# Patient Record
Sex: FEMALE | Race: WHITE | Employment: UNEMPLOYED | URBAN - METROPOLITAN AREA
[De-identification: names, ages, dates, MRNs, and addresses within clinical notes are randomized per-mention and may not be internally consistent; named-entity substitution may affect disease eponyms.]

---

## 2017-10-11 ENCOUNTER — CARE COORDINATION (OUTPATIENT)
Dept: CARE COORDINATION | Age: 47
End: 2017-10-11

## 2018-10-16 ENCOUNTER — NURSE ONLY (OUTPATIENT)
Dept: FAMILY MEDICINE CLINIC | Age: 48
End: 2018-10-16
Payer: MEDICARE

## 2018-10-16 ENCOUNTER — HOSPITAL ENCOUNTER (OUTPATIENT)
Age: 48
Discharge: HOME | End: 2018-10-18
Payer: MEDICARE

## 2018-10-16 DIAGNOSIS — E55.9 VITAMIN D DEFICIENCY: ICD-10-CM

## 2018-10-16 DIAGNOSIS — Z00.00 ROUTINE GENERAL MEDICAL EXAMINATION AT A HEALTH CARE FACILITY: Primary | ICD-10-CM

## 2018-10-16 PROCEDURE — 36415 COLL VENOUS BLD VENIPUNCTURE: CPT | Performed by: FAMILY MEDICINE

## 2018-10-16 PROCEDURE — 80053 COMPREHEN METABOLIC PANEL: CPT

## 2018-10-16 PROCEDURE — 82306 VITAMIN D 25 HYDROXY: CPT

## 2018-10-16 PROCEDURE — 80061 LIPID PANEL: CPT

## 2018-10-16 PROCEDURE — 85025 COMPLETE CBC W/AUTO DIFF WBC: CPT

## 2018-10-16 PROCEDURE — 84443 ASSAY THYROID STIM HORMONE: CPT

## 2018-10-17 DIAGNOSIS — Z00.00 ROUTINE GENERAL MEDICAL EXAMINATION AT A HEALTH CARE FACILITY: ICD-10-CM

## 2018-10-17 DIAGNOSIS — E55.9 VITAMIN D DEFICIENCY: ICD-10-CM

## 2018-10-17 LAB
ALBUMIN SERPL-MCNC: 4.3 G/DL (ref 3.5–5.2)
ALP BLD-CCNC: 50 U/L (ref 35–104)
ALT SERPL-CCNC: 20 U/L (ref 0–32)
ANION GAP SERPL CALCULATED.3IONS-SCNC: 12 MMOL/L (ref 7–16)
AST SERPL-CCNC: 24 U/L (ref 0–31)
BASOPHILS ABSOLUTE: 0.05 E9/L (ref 0–0.2)
BASOPHILS RELATIVE PERCENT: 0.9 % (ref 0–2)
BILIRUB SERPL-MCNC: 0.5 MG/DL (ref 0–1.2)
BUN BLDV-MCNC: 10 MG/DL (ref 6–20)
CALCIUM SERPL-MCNC: 9.6 MG/DL (ref 8.6–10.2)
CHLORIDE BLD-SCNC: 100 MMOL/L (ref 98–107)
CHOLESTEROL, TOTAL: 185 MG/DL (ref 0–199)
CO2: 25 MMOL/L (ref 22–29)
CREAT SERPL-MCNC: 0.8 MG/DL (ref 0.5–1)
EOSINOPHILS ABSOLUTE: 0.17 E9/L (ref 0.05–0.5)
EOSINOPHILS RELATIVE PERCENT: 3 % (ref 0–6)
GFR AFRICAN AMERICAN: >60
GFR NON-AFRICAN AMERICAN: >60 ML/MIN/1.73
GLUCOSE BLD-MCNC: 70 MG/DL (ref 74–109)
HCT VFR BLD CALC: 43.4 % (ref 34–48)
HDLC SERPL-MCNC: 57 MG/DL
HEMOGLOBIN: 13.8 G/DL (ref 11.5–15.5)
IMMATURE GRANULOCYTES #: 0.01 E9/L
IMMATURE GRANULOCYTES %: 0.2 % (ref 0–5)
LDL CHOLESTEROL CALCULATED: 109 MG/DL (ref 0–99)
LYMPHOCYTES ABSOLUTE: 1.28 E9/L (ref 1.5–4)
LYMPHOCYTES RELATIVE PERCENT: 22.2 % (ref 20–42)
MCH RBC QN AUTO: 30.2 PG (ref 26–35)
MCHC RBC AUTO-ENTMCNC: 31.8 % (ref 32–34.5)
MCV RBC AUTO: 95 FL (ref 80–99.9)
MONOCYTES ABSOLUTE: 0.39 E9/L (ref 0.1–0.95)
MONOCYTES RELATIVE PERCENT: 6.8 % (ref 2–12)
NEUTROPHILS ABSOLUTE: 3.86 E9/L (ref 1.8–7.3)
NEUTROPHILS RELATIVE PERCENT: 66.9 % (ref 43–80)
PDW BLD-RTO: 13.7 FL (ref 11.5–15)
PLATELET # BLD: 244 E9/L (ref 130–450)
PMV BLD AUTO: 10 FL (ref 7–12)
POTASSIUM SERPL-SCNC: 4.7 MMOL/L (ref 3.5–5)
RBC # BLD: 4.57 E12/L (ref 3.5–5.5)
SODIUM BLD-SCNC: 137 MMOL/L (ref 132–146)
TOTAL PROTEIN: 7.2 G/DL (ref 6.4–8.3)
TRIGL SERPL-MCNC: 93 MG/DL (ref 0–149)
TSH SERPL DL<=0.05 MIU/L-ACNC: 2.09 UIU/ML (ref 0.27–4.2)
VITAMIN D 25-HYDROXY: 24 NG/ML (ref 30–100)
VLDLC SERPL CALC-MCNC: 19 MG/DL
WBC # BLD: 5.8 E9/L (ref 4.5–11.5)

## 2018-10-22 ENCOUNTER — OFFICE VISIT (OUTPATIENT)
Dept: FAMILY MEDICINE CLINIC | Age: 48
End: 2018-10-22
Payer: MEDICARE

## 2018-10-22 VITALS
SYSTOLIC BLOOD PRESSURE: 120 MMHG | DIASTOLIC BLOOD PRESSURE: 74 MMHG | TEMPERATURE: 98.3 F | RESPIRATION RATE: 16 BRPM | HEART RATE: 63 BPM | BODY MASS INDEX: 24.64 KG/M2 | WEIGHT: 157 LBS | OXYGEN SATURATION: 100 % | HEIGHT: 67 IN

## 2018-10-22 DIAGNOSIS — Z12.31 ENCOUNTER FOR SCREENING MAMMOGRAM FOR MALIGNANT NEOPLASM OF BREAST: Primary | ICD-10-CM

## 2018-10-22 DIAGNOSIS — Z00.00 ANNUAL PHYSICAL EXAM: ICD-10-CM

## 2018-10-22 DIAGNOSIS — R63.4 RECENT WEIGHT LOSS: ICD-10-CM

## 2018-10-22 PROCEDURE — G8484 FLU IMMUNIZE NO ADMIN: HCPCS | Performed by: FAMILY MEDICINE

## 2018-10-22 PROCEDURE — 99396 PREV VISIT EST AGE 40-64: CPT | Performed by: FAMILY MEDICINE

## 2018-10-22 ASSESSMENT — PATIENT HEALTH QUESTIONNAIRE - PHQ9
SUM OF ALL RESPONSES TO PHQ9 QUESTIONS 1 & 2: 0
2. FEELING DOWN, DEPRESSED OR HOPELESS: 0
SUM OF ALL RESPONSES TO PHQ QUESTIONS 1-9: 0
SUM OF ALL RESPONSES TO PHQ QUESTIONS 1-9: 0
1. LITTLE INTEREST OR PLEASURE IN DOING THINGS: 0

## 2018-10-22 NOTE — PROGRESS NOTES
Annual exam:  Patient is here for routine yearly physical/preventative visit. Patient has no complaints or concerns today. Patient is  generally healthy. Chronic medical conditions are generally controlled. Most recent labs reviewed with patient and  are not remarkable. Health maintenance reviewed with patient and is  up to date. Overall doing well. She lost 45 lbs since July when she joined a fitness program with exercise and meal planning. She is very happy with it and feels great. Past Medical History:   Diagnosis Date    Hearing loss     congenital      No past surgical history on file. Family History   Problem Relation Age of Onset   [employer] Hearing Loss Mother         deaf    Hearing Loss Father         deaf    Heart Disease Father         pacemaker     Social History     Social History    Marital status:      Spouse name: N/A    Number of children: N/A    Years of education: N/A     Occupational History    Not on file.      Social History Main Topics    Smoking status: Former Smoker     Quit date: 1/10/2007    Smokeless tobacco: Never Used    Alcohol use Yes      Comment: 3 Seagrams per month    Drug use: Yes     Frequency: 1.0 time per week     Types: Marijuana      Comment: daily    Sexual activity: Yes     Partners: Male     Other Topics Concern    Not on file     Social History Narrative    No narrative on file      No Known Allergies     Review of Systems:  Constitutional:  No fever, no fatigue, no chills, no headaches, no weight change  Dermatology:  No rash, no mole, no dry or sensitive skin  ENT:  No cough, no sore throat, no sinus pain, no runny nose, no ear pain  Cardiology:  No chest pain, no palpitations, no leg edema, no shortness of breath, no PND  Endocrinology:  No polydipsia, no polyuria, no cold intolerance, no heat intolerance, no polyphagia, no hair changes  Gastroenterology:  No dysphagia, no abdominal pain, no nausea, no vomiting, no constipation, no diarrhea, no heartburn  Female Reproductive:  No hot flashes, no abnormal vaginal discharge, no pain with menstruation, no pelvic pain  Musculoskeletal:  No joint pain, no leg cramps, no back pain, no muscle aches  Respiratory:  No shortness of breath, no orthopnea, no wheezing, no CHOPRA, no hemoptysis  Urology:  No blood in the urine, no urinary frequency, no urinary incontinence, no urinary urgency, no nocturia, no dysuria  Neurology:  No numbness/tingling, no dizziness, no weakness  Psychology:  No depression, no sleep disturbances, no suicidal ideation, no anxiety    Physical Exam:  Vitals:    10/22/18 0824   BP: 120/74   Pulse: 63   Resp: 16   Temp: 98.3 °F (36.8 °C)   TempSrc: Oral   SpO2: 100%   Weight: 157 lb (71.2 kg)   Height: 5' 7\" (1.702 m)     General:  Patient alert and oriented x 3, NAD, pleasant  HEENT:  Atraumatic, normocephalic, PERRLA, EOMI, clear conjunctiva, TMs clear, nose-clear, throat - no erythema, tonsils- wnl  Neck:  Supple, no goiter, no carotid bruits, no lymphadenopathy  Lungs:  CTA B  Heart:  RRR, no murmurs, gallops or rubs  Abdomen:  Soft, NTND, + bowel sounds  Back: full ROM, no CVA tenderness  Extremities:  No clubbing, cyanosis or edema  Neuro:  CN II-XII grossly intact, 5/5 strength in bilateral upper and lower extremities, 2 + reflexes. Skin: unremarkable    Assessment/Plan:  Etta Fraga was seen today for annual exam and discuss labs. Diagnoses and all orders for this visit:    Encounter for screening mammogram for malignant neoplasm of breast  -     EVAN DIGITAL SCREEN W CAD BILATERAL; Future    Annual physical exam    Recent weight loss      As above. Call or go to ED immediately if symptoms worsen or persist.  No Follow-up on file. or sooner if necessary. Educational materials and/or home exercises printed for patient's review and were included in patient instructions on his/her After Visit Summary and given to patient at the end of visit.       Counseled regarding above diagnosis, including possible risks and complications,  especially if left uncontrolled. Counseled regarding the possible side effects, risks, benefits and alternatives to treatment; patient and/or guardian verbalizes understanding, agrees, feels comfortable with and wishes to proceed with above treatment plan. Advised patient to call with any new medication issues, and read all Rx info from pharmacy to assure aware of all possible risks and side effects of medication before taking. Reviewed age and gender appropriate health screening exams and vaccinations. Advised patient regarding importance of keeping up with recommended health maintenance and to schedule as soon as possible if overdue, as this is important in assessing for undiagnosed pathology, especially cancer, as well as protecting against potentially harmful/life threatening disease. Patient and/or guardian verbalizes understanding and agrees with above counseling, assessment and plan. All questions answered.

## 2018-12-05 DIAGNOSIS — Z12.31 ENCOUNTER FOR SCREENING MAMMOGRAM FOR MALIGNANT NEOPLASM OF BREAST: ICD-10-CM

## 2019-03-25 ENCOUNTER — OFFICE VISIT (OUTPATIENT)
Dept: FAMILY MEDICINE CLINIC | Age: 49
End: 2019-03-25
Payer: MEDICARE

## 2019-03-25 VITALS
TEMPERATURE: 98.4 F | DIASTOLIC BLOOD PRESSURE: 72 MMHG | HEART RATE: 78 BPM | WEIGHT: 157 LBS | OXYGEN SATURATION: 97 % | HEIGHT: 67 IN | SYSTOLIC BLOOD PRESSURE: 116 MMHG | RESPIRATION RATE: 16 BRPM | BODY MASS INDEX: 24.64 KG/M2

## 2019-03-25 DIAGNOSIS — N64.4 BREAST PAIN: Primary | ICD-10-CM

## 2019-03-25 PROCEDURE — G8420 CALC BMI NORM PARAMETERS: HCPCS | Performed by: FAMILY MEDICINE

## 2019-03-25 PROCEDURE — 99213 OFFICE O/P EST LOW 20 MIN: CPT | Performed by: FAMILY MEDICINE

## 2019-03-25 PROCEDURE — G8510 SCR DEP NEG, NO PLAN REQD: HCPCS | Performed by: FAMILY MEDICINE

## 2019-03-25 PROCEDURE — G8427 DOCREV CUR MEDS BY ELIG CLIN: HCPCS | Performed by: FAMILY MEDICINE

## 2019-03-25 PROCEDURE — G8484 FLU IMMUNIZE NO ADMIN: HCPCS | Performed by: FAMILY MEDICINE

## 2019-03-25 PROCEDURE — 1036F TOBACCO NON-USER: CPT | Performed by: FAMILY MEDICINE

## 2019-03-25 ASSESSMENT — PATIENT HEALTH QUESTIONNAIRE - PHQ9
1. LITTLE INTEREST OR PLEASURE IN DOING THINGS: 0
2. FEELING DOWN, DEPRESSED OR HOPELESS: 0
SUM OF ALL RESPONSES TO PHQ9 QUESTIONS 1 & 2: 0
SUM OF ALL RESPONSES TO PHQ QUESTIONS 1-9: 0
SUM OF ALL RESPONSES TO PHQ QUESTIONS 1-9: 0

## 2019-04-05 DIAGNOSIS — N64.4 BREAST PAIN: ICD-10-CM

## 2019-06-10 ENCOUNTER — APPOINTMENT (OUTPATIENT)
Dept: GENERAL RADIOLOGY | Age: 49
End: 2019-06-10
Payer: MEDICARE

## 2019-06-10 ENCOUNTER — HOSPITAL ENCOUNTER (EMERGENCY)
Age: 49
Discharge: HOME | End: 2019-06-10
Payer: MEDICARE

## 2019-06-10 VITALS
WEIGHT: 157 LBS | TEMPERATURE: 98.4 F | HEART RATE: 69 BPM | DIASTOLIC BLOOD PRESSURE: 86 MMHG | HEIGHT: 67 IN | SYSTOLIC BLOOD PRESSURE: 137 MMHG | RESPIRATION RATE: 16 BRPM | OXYGEN SATURATION: 96 % | BODY MASS INDEX: 24.64 KG/M2

## 2019-06-10 DIAGNOSIS — R07.89 CHEST WALL PAIN: ICD-10-CM

## 2019-06-10 DIAGNOSIS — R07.81 RIB PAIN ON LEFT SIDE: Primary | ICD-10-CM

## 2019-06-10 PROCEDURE — 99283 EMERGENCY DEPT VISIT LOW MDM: CPT

## 2019-06-10 PROCEDURE — 71101 X-RAY EXAM UNILAT RIBS/CHEST: CPT

## 2019-06-10 PROCEDURE — 6360000002 HC RX W HCPCS: Performed by: NURSE PRACTITIONER

## 2019-06-10 PROCEDURE — 96372 THER/PROPH/DIAG INJ SC/IM: CPT

## 2019-06-10 RX ORDER — MELOXICAM 7.5 MG/1
7.5 TABLET ORAL 2 TIMES DAILY
Qty: 30 TABLET | Refills: 0 | Status: SHIPPED | OUTPATIENT
Start: 2019-06-10 | End: 2019-07-03 | Stop reason: SDUPTHER

## 2019-06-10 RX ORDER — PREDNISONE 20 MG/1
TABLET ORAL
Qty: 18 TABLET | Refills: 0 | Status: SHIPPED | OUTPATIENT
Start: 2019-06-10 | End: 2019-06-20

## 2019-06-10 RX ORDER — DEXAMETHASONE SODIUM PHOSPHATE 10 MG/ML
10 INJECTION INTRAMUSCULAR; INTRAVENOUS ONCE
Status: COMPLETED | OUTPATIENT
Start: 2019-06-10 | End: 2019-06-10

## 2019-06-10 RX ORDER — CYCLOBENZAPRINE HCL 10 MG
10 TABLET ORAL 3 TIMES DAILY PRN
Qty: 30 TABLET | Refills: 0 | Status: SHIPPED | OUTPATIENT
Start: 2019-06-10 | End: 2019-06-20

## 2019-06-10 RX ORDER — KETOROLAC TROMETHAMINE 30 MG/ML
60 INJECTION, SOLUTION INTRAMUSCULAR; INTRAVENOUS ONCE
Status: COMPLETED | OUTPATIENT
Start: 2019-06-10 | End: 2019-06-10

## 2019-06-10 RX ADMIN — DEXAMETHASONE SODIUM PHOSPHATE 10 MG: 10 INJECTION INTRAMUSCULAR; INTRAVENOUS at 11:22

## 2019-06-10 RX ADMIN — KETOROLAC TROMETHAMINE 60 MG: 30 INJECTION, SOLUTION INTRAMUSCULAR at 11:22

## 2019-06-10 ASSESSMENT — PAIN DESCRIPTION - PAIN TYPE: TYPE: ACUTE PAIN

## 2019-06-10 ASSESSMENT — PAIN SCALES - WONG BAKER: WONGBAKER_NUMERICALRESPONSE: 2

## 2019-06-10 ASSESSMENT — PAIN DESCRIPTION - ONSET: ONSET: ON-GOING

## 2019-06-10 ASSESSMENT — PAIN DESCRIPTION - FREQUENCY: FREQUENCY: INTERMITTENT

## 2019-06-10 ASSESSMENT — PAIN DESCRIPTION - DESCRIPTORS: DESCRIPTORS: DISCOMFORT

## 2019-06-10 ASSESSMENT — PAIN DESCRIPTION - ORIENTATION: ORIENTATION: LEFT

## 2019-06-10 ASSESSMENT — PAIN DESCRIPTION - LOCATION: LOCATION: BREAST

## 2019-06-10 ASSESSMENT — PAIN DESCRIPTION - PROGRESSION: CLINICAL_PROGRESSION: GRADUALLY WORSENING

## 2019-06-10 ASSESSMENT — PAIN SCALES - GENERAL: PAINLEVEL_OUTOF10: 8

## 2019-06-10 NOTE — LETTER
[facility] Emergency Department  [address] [place] [id number]  Phone: [phone]               Holley 10, 2019    Patient: Saniya Oliver   YOB: 1970   Date of Visit: 6/10/2019       To Whom It May Concern:    Kwame Vega was seen and treated in our emergency department on 6/10/2019. She may return to work on 06/12/2019.       Sincerely,       Lindy Marin RN         Signature:__________________________________

## 2019-06-10 NOTE — ED PROVIDER NOTES
[facility]     Department of Emergency Medicine   ED  Provider Note  Admit Date/RoomTime: 6/10/2019  9:42 AM  ED Room: 32/32   Chief Complaint   Rib Pain (left side, had recent US done at [facility].  )    History of Present Illness   Source of history provided by:  patient. History/Exam Limitations: none. Hakan Erazo is a 52 y.o. old female with a past medical history of:   Past Medical History:   Diagnosis Date    Hearing loss     congenital    presents to the emergency department by private vehicle, for pressure-like left lateral chest pain which occured 4 day(s) prior to arrival.  The complaint occurred as a result of no trauma. Previous injury: no.  Since onset the symptoms have been moderate in degree. Her symptoms are associated with none. Her pain is aggraveated by position, breathing, touch and chest wall motion and relieved by nothing. She denies any head injury or loss of consciousness. Tetanus Status: up to date. ROS    Pertinent positives and negatives are stated within HPI, all other systems reviewed and are negative. Past Surgical History:  has no past surgical history on file. Social History:  reports that she quit smoking about 12 years ago. She has never used smokeless tobacco. She reports that she drinks alcohol. She reports that she has current or past drug history. Drug: Marijuana. Frequency: 1.00 time per week. Family History: family history includes Hearing Loss in her father and mother; Heart Disease in her father. Allergies: Patient has no known allergies. Physical Exam           ED Triage Vitals [06/10/19 0941]   BP Temp Temp Source Pulse Resp SpO2 Height Weight   137/86 98.4 °F (36.9 °C) Oral 69 16 96 % 5' 7\" (1.702 m) 157 lb (71.2 kg)      Oxygen Saturation Interpretation: Normal.    Constitutional:  Alert, development consistent with age. HEENT:  NC/NT. Airway patent. Neck:  Normal ROM. Supple.   Respiratory:  Clear to auscultation and breath sounds equal.  CV:  Regular rate and rhythm, normal heart sounds, without pathological murmurs, ectopy, gallops, or rubs. Chest:  left lateral chest tender to palpation, which does reproduce pain. Crepitance: No.          Skin:  Without swelling, erythema or lesions noted. GI:  Abdomen Soft, nontender, good bowel sounds. No firm or pulsatile mass. Integument:  Normal turgor. Warm, dry, without visible rash, unless noted elsewhere. Extremities: No tenderness or edema noted. Neurological:  Oriented. Motor functions intact. Lab / Imaging Results   (All laboratory and radiology results have been personally reviewed by myself)  Labs:  No results found for this visit on 06/10/19. Imaging: All Radiology results interpreted by Radiologist unless otherwise noted. XR RIBS LEFT INCLUDE CHEST (MIN 3 VIEWS)   Final Result   No significant abnormal findings. ED Course / Medical Decision Making     Medications   dexamethasone (DECADRON) injection 10 mg (10 mg Intramuscular Given 6/10/19 1122)   ketorolac (TORADOL) injection 60 mg (60 mg Intramuscular Given 6/10/19 1122)        Re-examination:  6/10/19       Time: 1000   Patients symptoms are improving. Consult(s):   None    Procedure(s):   none    MDM:   No history of trauma- patient said she has a sharp pain in the side of her chest- she had an US yesterday which was negative for anything acute and the CXR today was also negative. PAtient treated for the pain and discharged home    Counseling: The emergency provider has spoken with the patient and discussed todays results, in addition to providing specific details for the plan of care and counseling regarding the diagnosis and prognosis. Questions are answered at this time and they are agreeable with the plan. Assessment     1. Rib pain on left side    2.  Chest wall pain      Plan   Discharge to home  Patient condition is stable    New Medications     Discharge Medication List as of 6/10/2019 10:41 AM      START taking these medications    Details   cyclobenzaprine (FLEXERIL) 10 MG tablet Take 1 tablet by mouth 3 times daily as needed for Muscle spasms, Disp-30 tablet, R-0Print      predniSONE (DELTASONE) 20 MG tablet Sig.: Take 60mg  Po qd x 3 days, then 40mg po qd x3 days, then 20mg po qd x 3 days. QS x 9 days, Disp-18 tablet, R-0Print      meloxicam (MOBIC) 7.5 MG tablet Take 1 tablet by mouth 2 times daily, Disp-30 tablet, R-0Print           Electronically signed by Sharyle Cue, APRN - CNP   DD: 6/10/19  **This report was transcribed using voice recognition software. Every effort was made to ensure accuracy; however, inadvertent computerized transcription errors may be present.   END OF ED PROVIDER NOTE      Sharyle Cue, APRN - CNP  06/11/19 2021

## 2019-06-13 ENCOUNTER — TELEPHONE (OUTPATIENT)
Dept: FAMILY MEDICINE CLINIC | Age: 49
End: 2019-06-13

## 2019-06-13 NOTE — TELEPHONE ENCOUNTER
[facility] ([facility]) ED Follow up Call    Reason for ED visit:  Rib Pain    Unable to reach patient

## 2019-06-14 ENCOUNTER — TELEPHONE (OUTPATIENT)
Dept: FAMILY MEDICINE CLINIC | Age: 49
End: 2019-06-14

## 2019-06-14 NOTE — TELEPHONE ENCOUNTER
Based on Primary Care 7 Measures, patient is due for their Medicare Annual Wellness Visit. Letter sent to patient in regards to scheduling at their earliest convenience.

## 2019-07-03 ENCOUNTER — OFFICE VISIT (OUTPATIENT)
Dept: FAMILY MEDICINE CLINIC | Age: 49
End: 2019-07-03
Payer: MEDICARE

## 2019-07-03 VITALS
RESPIRATION RATE: 16 BRPM | OXYGEN SATURATION: 96 % | TEMPERATURE: 98.6 F | SYSTOLIC BLOOD PRESSURE: 122 MMHG | BODY MASS INDEX: 23.32 KG/M2 | HEIGHT: 67 IN | HEART RATE: 61 BPM | WEIGHT: 148.6 LBS | DIASTOLIC BLOOD PRESSURE: 79 MMHG

## 2019-07-03 DIAGNOSIS — S29.011A MUSCLE STRAIN OF CHEST WALL, INITIAL ENCOUNTER: Primary | ICD-10-CM

## 2019-07-03 PROCEDURE — 1036F TOBACCO NON-USER: CPT | Performed by: FAMILY MEDICINE

## 2019-07-03 PROCEDURE — G8427 DOCREV CUR MEDS BY ELIG CLIN: HCPCS | Performed by: FAMILY MEDICINE

## 2019-07-03 PROCEDURE — 99213 OFFICE O/P EST LOW 20 MIN: CPT | Performed by: FAMILY MEDICINE

## 2019-07-03 PROCEDURE — G8420 CALC BMI NORM PARAMETERS: HCPCS | Performed by: FAMILY MEDICINE

## 2019-07-03 RX ORDER — MELOXICAM 7.5 MG/1
7.5 TABLET ORAL 2 TIMES DAILY
Qty: 60 TABLET | Refills: 0 | Status: SHIPPED | OUTPATIENT
Start: 2019-07-03 | End: 2019-08-02 | Stop reason: SDUPTHER

## 2019-07-03 NOTE — PROGRESS NOTES
Chief Complaint   Patient presents with    Breast Pain    Follow-Up from Hospital       HPI:  Anitha Robin presents to the office for ER follow up. Patient was seen in the ER for left side breast pain on 6/10. Since being discharged from the ER Belén's  symptoms have been improving but ongoing. She rates pain 2/10. Any prescribed medications have been finished. Discharge instructions and any medication changes were again reviewed. Patient's past medical, surgical, social and/or family history reviewed, updated in chart, and are non-contributory (unless otherwise stated). Medications and allergies also reviewed and updated in chart.       Review of Systems:  Constitutional:  No fever, no fatigue, no chills, no headaches, no weight change  Dermatology:  No rash, no mole, no dry or sensitive skin  ENT:  No cough, no sore throat, no sinus pain, no runny nose, no ear pain  Cardiology:  No chest pain, no palpitations, no leg edema, no shortness of breath, no PND  Gastroenterology:  No dysphagia, no abdominal pain, no nausea, no vomiting, no constipation, no diarrhea, no heartburn  Musculoskeletal:  No joint pain, no leg cramps, no back pain, no muscle aches  Respiratory:  No shortness of breath, no orthopnea, no wheezing, no CHOPRA, no hemoptysis  Urology:  No blood in the urine, no urinary frequency, no urinary incontinence, no urinary urgency, no nocturia, no dysuria    Vitals:    07/03/19 1127   BP: 122/79   Pulse: 61   Resp: 16   Temp: 98.6 °F (37 °C)   TempSrc: Oral   SpO2: 96%   Weight: 148 lb 9.6 oz (67.4 kg)   Height: 5' 7\" (1.702 m)       General:  Patient alert and oriented x 3, NAD, pleasant  HEENT:  Atraumatic, normocephalic, PERRLA, EOMI, clear conjunctiva, TMs clear, nose-clear, throat - no erythema  Neck:  Supple, no goiter, no carotid bruits, no LAD  Lungs:  CTA B  Heart:  RRR, no murmurs, gallops or rubs  Abdomen:  Soft/nt/nd, + bowel sounds  Extremities:  No clubbing, cyanosis or edema  Skin:

## 2019-08-02 RX ORDER — MELOXICAM 7.5 MG/1
7.5 TABLET ORAL 2 TIMES DAILY
Qty: 60 TABLET | Refills: 0 | Status: SHIPPED | OUTPATIENT
Start: 2019-08-02 | End: 2019-09-10 | Stop reason: SDUPTHER

## 2019-09-10 RX ORDER — MELOXICAM 7.5 MG/1
7.5 TABLET ORAL 2 TIMES DAILY
Qty: 60 TABLET | Refills: 3 | Status: SHIPPED
Start: 2019-09-10 | End: 2020-04-03 | Stop reason: SDUPTHER

## 2019-09-19 ENCOUNTER — OFFICE VISIT (OUTPATIENT)
Dept: FAMILY MEDICINE CLINIC | Age: 49
End: 2019-09-19
Payer: MEDICARE

## 2019-09-19 VITALS
HEART RATE: 69 BPM | DIASTOLIC BLOOD PRESSURE: 71 MMHG | TEMPERATURE: 98.6 F | WEIGHT: 147.4 LBS | HEIGHT: 67 IN | OXYGEN SATURATION: 97 % | SYSTOLIC BLOOD PRESSURE: 116 MMHG | BODY MASS INDEX: 23.13 KG/M2 | RESPIRATION RATE: 16 BRPM

## 2019-09-19 DIAGNOSIS — R63.4 RECENT WEIGHT LOSS: ICD-10-CM

## 2019-09-19 DIAGNOSIS — Z12.31 ENCOUNTER FOR SCREENING MAMMOGRAM FOR MALIGNANT NEOPLASM OF BREAST: Primary | ICD-10-CM

## 2019-09-19 DIAGNOSIS — E78.5 HYPERLIPIDEMIA, UNSPECIFIED HYPERLIPIDEMIA TYPE: ICD-10-CM

## 2019-09-19 DIAGNOSIS — E55.9 VITAMIN D DEFICIENCY: ICD-10-CM

## 2019-09-19 DIAGNOSIS — Z00.00 ROUTINE GENERAL MEDICAL EXAMINATION AT A HEALTH CARE FACILITY: ICD-10-CM

## 2019-09-19 PROCEDURE — 90471 IMMUNIZATION ADMIN: CPT | Performed by: FAMILY MEDICINE

## 2019-09-19 PROCEDURE — 90715 TDAP VACCINE 7 YRS/> IM: CPT | Performed by: FAMILY MEDICINE

## 2019-09-19 PROCEDURE — G0438 PPPS, INITIAL VISIT: HCPCS | Performed by: FAMILY MEDICINE

## 2019-09-19 ASSESSMENT — PATIENT HEALTH QUESTIONNAIRE - PHQ9
SUM OF ALL RESPONSES TO PHQ QUESTIONS 1-9: 2
SUM OF ALL RESPONSES TO PHQ QUESTIONS 1-9: 2

## 2019-09-19 ASSESSMENT — LIFESTYLE VARIABLES
HOW OFTEN DO YOU HAVE SIX OR MORE DRINKS ON ONE OCCASION: 0
HOW OFTEN DO YOU HAVE A DRINK CONTAINING ALCOHOL: 1
HOW OFTEN DURING THE LAST YEAR HAVE YOU HAD A FEELING OF GUILT OR REMORSE AFTER DRINKING: 0
HOW OFTEN DURING THE LAST YEAR HAVE YOU BEEN UNABLE TO REMEMBER WHAT HAPPENED THE NIGHT BEFORE BECAUSE YOU HAD BEEN DRINKING: 0
HAVE YOU OR SOMEONE ELSE BEEN INJURED AS A RESULT OF YOUR DRINKING: 0
AUDIT-C TOTAL SCORE: 2
HAS A RELATIVE, FRIEND, DOCTOR, OR ANOTHER HEALTH PROFESSIONAL EXPRESSED CONCERN ABOUT YOUR DRINKING OR SUGGESTED YOU CUT DOWN: 0
HOW OFTEN DURING THE LAST YEAR HAVE YOU FAILED TO DO WHAT WAS NORMALLY EXPECTED FROM YOU BECAUSE OF DRINKING: 0
HOW OFTEN DURING THE LAST YEAR HAVE YOU FOUND THAT YOU WERE NOT ABLE TO STOP DRINKING ONCE YOU HAD STARTED: 0
HOW MANY STANDARD DRINKS CONTAINING ALCOHOL DO YOU HAVE ON A TYPICAL DAY: 1
HOW OFTEN DURING THE LAST YEAR HAVE YOU NEEDED AN ALCOHOLIC DRINK FIRST THING IN THE MORNING TO GET YOURSELF GOING AFTER A NIGHT OF HEAVY DRINKING: 0
AUDIT TOTAL SCORE: 2

## 2019-09-19 NOTE — PROGRESS NOTES
Questionnaire: Screen for Alcohol Misuse  How often do you have a drink containing alcohol?: Monthly or less  How many standard drinks containing alcohol do you have on a typical day when drinking?: Three or four  How often do you have six or more drinks on one occasion?: Never  Audit-C Score: 2  During the past year, how often have you found that you were not able to stop drinking once you had started?: Never  During the past year, how often have you failed to do what was normally expected of you because of drinking?: Never  During the past year, how often have you needed a drink in the morning to get yourself going after a heavy drinking session?: Never  During the past year, how often have you had a feeling of guilt or remorse after drinking?: Never  During the past year, have you been unable to remember what happened the night before because you had been drinking?: Never  Have you or someone else been injured as a result of your drinking?: No  Has a relative or friend, doctor or health worker been concerned about your drinking or suggested you cut down?: No  Total Score: 2  Substance Abuse Interventions:  · none    General Health:  General  In general, how would you say your health is?: Very Good  In the past 7 days, have you experienced any of the following? New or Increased Pain, New or Increased Fatigue, Loneliness, Social Isolation, Stress or Anger?: None of These  Do you get the social and emotional support that you need?: Yes  Do you have a Living Will?: (!) No  General Health Risk Interventions:  · No Living Will: provided the state-specific advance directive document to the patient    Health Habits/Nutrition:  Health Habits/Nutrition  Do you exercise for at least 20 minutes 2-3 times per week?: Yes  Have you lost any weight without trying in the past 3 months?: No  Do you eat fewer than 2 meals per day?: No  Have you seen a dentist within the past year?: (!) No  Body mass index is 23.09 kg/m².   Health Habits/Nutrition Interventions:  · Dental exam overdue:  patient encouraged to make appointment with his/her dentist    Personalized Preventive Plan   Current Health Maintenance Status    There is no immunization history on file for this patient. Health Maintenance   Topic Date Due    HIV screen  03/10/1985    DTaP/Tdap/Td vaccine (1 - Tdap) 03/10/1989    Annual Wellness Visit (AWV)  05/29/2019    Flu vaccine (1) 09/01/2019    Breast cancer screen  11/20/2019    Cervical cancer screen  06/22/2020    Lipid screen  10/16/2023    Pneumococcal 0-64 years Vaccine  Aged Out     Recommendations for [employer] Due: see orders and patient instructions/AVS.  . Recommended screening schedule for the next 5-10 years is provided to the patient in written form: see Patient Instructions/AVS.    Assunta Spurling was seen today for medicare awv.     Diagnoses and all orders for this visit:    Encounter for screening mammogram for malignant neoplasm of breast

## 2019-09-19 NOTE — PATIENT INSTRUCTIONS
Personalized Preventive Plan for Victor Hugo Cunningham - 9/19/2019  Medicare offers a range of preventive health benefits. Some of the tests and screenings are paid in full while other may be subject to a deductible, co-insurance, and/or copay. Some of these benefits include a comprehensive review of your medical history including lifestyle, illnesses that may run in your family, and various assessments and screenings as appropriate. After reviewing your medical record and screening and assessments performed today your provider may have ordered immunizations, labs, imaging, and/or referrals for you. A list of these orders (if applicable) as well as your Preventive Care list are included within your After Visit Summary for your review. Other Preventive Recommendations:    · A preventive eye exam performed by an eye specialist is recommended every 1-2 years to screen for glaucoma; cataracts, macular degeneration, and other eye disorders. · A preventive dental visit is recommended every 6 months. · Try to get at least 150 minutes of exercise per week or 10,000 steps per day on a pedometer . · Order or download the FREE \"Exercise & Physical Activity: Your Everyday Guide\" from [employer]. Call 8-198.967.5859 or search [employer] online. · You need 5875-7424 mg of calcium and 5644-6796 IU of vitamin D per day. It is possible to meet your calcium requirement with diet alone, but a vitamin D supplement is usually necessary to meet this goal.  · When exposed to the sun, use a sunscreen that protects against both UVA and UVB radiation with an SPF of 30 or greater. Reapply every 2 to 3 hours or after sweating, drying off with a towel, or swimming. · Always wear a seat belt when traveling in a car. Always wear a helmet when riding a bicycle or motorcycle.

## 2020-04-03 RX ORDER — MELOXICAM 7.5 MG/1
7.5 TABLET ORAL 2 TIMES DAILY
Qty: 60 TABLET | Refills: 3 | Status: SHIPPED
Start: 2020-04-03 | End: 2021-01-21

## 2020-08-11 ENCOUNTER — OFFICE VISIT (OUTPATIENT)
Dept: FAMILY MEDICINE CLINIC | Age: 50
End: 2020-08-11
Payer: MEDICARE

## 2020-08-11 VITALS
TEMPERATURE: 97 F | WEIGHT: 165 LBS | DIASTOLIC BLOOD PRESSURE: 83 MMHG | OXYGEN SATURATION: 97 % | BODY MASS INDEX: 25.84 KG/M2 | HEART RATE: 79 BPM | SYSTOLIC BLOOD PRESSURE: 132 MMHG

## 2020-08-11 PROCEDURE — G8419 CALC BMI OUT NRM PARAM NOF/U: HCPCS | Performed by: FAMILY MEDICINE

## 2020-08-11 PROCEDURE — 3017F COLORECTAL CA SCREEN DOC REV: CPT | Performed by: FAMILY MEDICINE

## 2020-08-11 PROCEDURE — G8510 SCR DEP NEG, NO PLAN REQD: HCPCS | Performed by: FAMILY MEDICINE

## 2020-08-11 PROCEDURE — 1036F TOBACCO NON-USER: CPT | Performed by: FAMILY MEDICINE

## 2020-08-11 PROCEDURE — G8427 DOCREV CUR MEDS BY ELIG CLIN: HCPCS | Performed by: FAMILY MEDICINE

## 2020-08-11 PROCEDURE — 99213 OFFICE O/P EST LOW 20 MIN: CPT | Performed by: FAMILY MEDICINE

## 2020-08-11 RX ORDER — CEPHALEXIN 500 MG/1
500 CAPSULE ORAL 2 TIMES DAILY
Qty: 14 CAPSULE | Refills: 0 | Status: SHIPPED
Start: 2020-08-11 | End: 2020-10-20

## 2020-08-11 SDOH — ECONOMIC STABILITY: FOOD INSECURITY: WITHIN THE PAST 12 MONTHS, THE FOOD YOU BOUGHT JUST DIDN'T LAST AND YOU DIDN'T HAVE MONEY TO GET MORE.: SOMETIMES TRUE

## 2020-08-11 SDOH — ECONOMIC STABILITY: INCOME INSECURITY: HOW HARD IS IT FOR YOU TO PAY FOR THE VERY BASICS LIKE FOOD, HOUSING, MEDICAL CARE, AND HEATING?: SOMEWHAT HARD

## 2020-08-11 SDOH — ECONOMIC STABILITY: FOOD INSECURITY: WITHIN THE PAST 12 MONTHS, YOU WORRIED THAT YOUR FOOD WOULD RUN OUT BEFORE YOU GOT MONEY TO BUY MORE.: SOMETIMES TRUE

## 2020-08-11 ASSESSMENT — PATIENT HEALTH QUESTIONNAIRE - PHQ9
2. FEELING DOWN, DEPRESSED OR HOPELESS: 0
SUM OF ALL RESPONSES TO PHQ QUESTIONS 1-9: 0
SUM OF ALL RESPONSES TO PHQ QUESTIONS 1-9: 0
1. LITTLE INTEREST OR PLEASURE IN DOING THINGS: 0
SUM OF ALL RESPONSES TO PHQ9 QUESTIONS 1 & 2: 0

## 2020-08-11 NOTE — PROGRESS NOTES
Chief Complaint   Patient presents with    Other     ?vaginal infection        HPI:  Patient is here for follow-up of lump. Patient complains of pain. Pt here today for eval small lump on her groin area. No drainage or seeping. Pt noticed it about 1 week ago d/t being painful. Patient's past medical, surgical, social and/or family history reviewed, updated in chart, and are non-contributory (unless otherwise stated). Medications and allergies also reviewed and updated in chart. Review of Systems:  Constitutional:  No fever, no fatigue, no chills, no headaches, no weight change  Dermatology:  No rash, no mole, no dry or sensitive skin  ENT:  No cough, no sore throat, no sinus pain, no runny nose, no ear pain  Cardiology:  No chest pain, no palpitations, no leg edema, no shortness of breath, no PND  Gastroenterology:  No dysphagia, no abdominal pain, no nausea, no vomiting, no constipation, no diarrhea, no heartburn  Musculoskeletal:  No joint pain, no leg cramps, no back pain, no muscle aches  Respiratory:  No shortness of breath, no orthopnea, no wheezing, no CHOPRA, no hemoptysis  Urology:  No blood in the urine, no urinary frequency, no urinary incontinence, no urinary urgency, no nocturia, no dysuria  Vitals:    08/11/20 1205   BP: 132/83   Pulse: 79   Temp: 97 °F (36.1 °C)   SpO2: 97%   Weight: 165 lb (74.8 kg)       General:  Patient alert and oriented x 3, NAD, pleasant  HEENT:  Atraumatic, normocephalic, PERRLA, EOMI, clear conjunctiva, TMs clear, nose-clear, throat - no erythema  Neck:  Supple, no goiter, no carotid bruits, no lymphadenopathy  Lungs:  CTA B  Heart:  RRR, no murmurs, gallops or rubs  Abdomen:  Soft/nt/nd, + bowel sounds  Extremities:  No clubbing, cyanosis or edema  Skin: unremarkable    Assessment/Plan:      Alejandra Jon was seen today for other. Diagnoses and all orders for this visit:    Folliculitis    Other orders  -     cephALEXin (KEFLEX) 500 MG capsule;  Take 1 capsule by mouth 2 times daily      As above. Call or go to ED immediately if symptoms worsen or persist.  No follow-ups on file. , or sooner if necessary. Educational materials and/or home exercises printed for patient's review and were included in patient instructions on his/her After Visit Summary and given to patient at the end of visit. Counseled regarding above diagnosis, including possible risks and complications,  especially if left uncontrolled. Counseled regarding the possible side effects, risks, benefits and alternatives to treatment; patient and/or guardian verbalizes understanding, agrees, feels comfortable with and wishes to proceed with above treatment plan. Advised patient to call with any new medication issues, and read all Rx info from pharmacy to assure aware of all possible risks and side effects of medication before taking. Reviewed age and gender appropriate health screening exams and vaccinations. Advised patient regarding importance of keeping up with recommended health maintenance and to schedule as soon as possible if overdue, as this is important in assessing for undiagnosed pathology, especially cancer, as well as protecting against potentially harmful/life threatening disease. Patient and/or guardian verbalizes understanding and agrees with above counseling, assessment and plan. All questions answered. Gabriel Deleon MD  8/11/2020    I have personally reviewed and updated the chief complaint, HPI, Past Medical, Family and Social History, as well as the above Review of Systems.

## 2020-10-13 ENCOUNTER — NURSE ONLY (OUTPATIENT)
Dept: FAMILY MEDICINE CLINIC | Age: 50
End: 2020-10-13
Payer: MEDICARE

## 2020-10-13 ENCOUNTER — HOSPITAL ENCOUNTER (OUTPATIENT)
Age: 50
Discharge: HOME | End: 2020-10-15
Payer: MEDICARE

## 2020-10-13 LAB
ALBUMIN SERPL-MCNC: 4.4 G/DL (ref 3.5–5.2)
ALP BLD-CCNC: 56 U/L (ref 35–104)
ALT SERPL-CCNC: 13 U/L (ref 0–32)
ANION GAP SERPL CALCULATED.3IONS-SCNC: 12 MMOL/L (ref 7–16)
AST SERPL-CCNC: 22 U/L (ref 0–31)
BASOPHILS ABSOLUTE: 0.07 E9/L (ref 0–0.2)
BASOPHILS RELATIVE PERCENT: 1 % (ref 0–2)
BILIRUB SERPL-MCNC: 0.7 MG/DL (ref 0–1.2)
BUN BLDV-MCNC: 12 MG/DL (ref 6–20)
CALCIUM SERPL-MCNC: 9.6 MG/DL (ref 8.6–10.2)
CHLORIDE BLD-SCNC: 102 MMOL/L (ref 98–107)
CHOLESTEROL, TOTAL: 175 MG/DL (ref 0–199)
CO2: 25 MMOL/L (ref 22–29)
CREAT SERPL-MCNC: 0.9 MG/DL (ref 0.5–1)
EOSINOPHILS ABSOLUTE: 0.14 E9/L (ref 0.05–0.5)
EOSINOPHILS RELATIVE PERCENT: 2 % (ref 0–6)
GFR AFRICAN AMERICAN: >60
GFR NON-AFRICAN AMERICAN: >60 ML/MIN/1.73
GLUCOSE BLD-MCNC: 69 MG/DL (ref 74–99)
HCT VFR BLD CALC: 43.4 % (ref 34–48)
HDLC SERPL-MCNC: 55 MG/DL
HEMOGLOBIN: 14.3 G/DL (ref 11.5–15.5)
IMMATURE GRANULOCYTES #: 0.02 E9/L
IMMATURE GRANULOCYTES %: 0.3 % (ref 0–5)
LDL CHOLESTEROL CALCULATED: 106 MG/DL (ref 0–99)
LYMPHOCYTES ABSOLUTE: 1.51 E9/L (ref 1.5–4)
LYMPHOCYTES RELATIVE PERCENT: 21.8 % (ref 20–42)
MCH RBC QN AUTO: 30.7 PG (ref 26–35)
MCHC RBC AUTO-ENTMCNC: 32.9 % (ref 32–34.5)
MCV RBC AUTO: 93.1 FL (ref 80–99.9)
MONOCYTES ABSOLUTE: 0.43 E9/L (ref 0.1–0.95)
MONOCYTES RELATIVE PERCENT: 6.2 % (ref 2–12)
NEUTROPHILS ABSOLUTE: 4.76 E9/L (ref 1.8–7.3)
NEUTROPHILS RELATIVE PERCENT: 68.7 % (ref 43–80)
PDW BLD-RTO: 13.2 FL (ref 11.5–15)
PLATELET # BLD: 277 E9/L (ref 130–450)
PMV BLD AUTO: 10 FL (ref 7–12)
POTASSIUM SERPL-SCNC: 4.3 MMOL/L (ref 3.5–5)
RBC # BLD: 4.66 E12/L (ref 3.5–5.5)
SODIUM BLD-SCNC: 139 MMOL/L (ref 132–146)
TOTAL PROTEIN: 7.5 G/DL (ref 6.4–8.3)
TRIGL SERPL-MCNC: 68 MG/DL (ref 0–149)
TSH SERPL DL<=0.05 MIU/L-ACNC: 1.97 UIU/ML (ref 0.27–4.2)
VLDLC SERPL CALC-MCNC: 14 MG/DL
WBC # BLD: 6.9 E9/L (ref 4.5–11.5)

## 2020-10-13 PROCEDURE — 36415 COLL VENOUS BLD VENIPUNCTURE: CPT | Performed by: FAMILY MEDICINE

## 2020-10-13 PROCEDURE — 84443 ASSAY THYROID STIM HORMONE: CPT

## 2020-10-13 PROCEDURE — 80053 COMPREHEN METABOLIC PANEL: CPT

## 2020-10-13 PROCEDURE — 85025 COMPLETE CBC W/AUTO DIFF WBC: CPT

## 2020-10-13 PROCEDURE — 80061 LIPID PANEL: CPT

## 2020-10-20 ENCOUNTER — OFFICE VISIT (OUTPATIENT)
Dept: FAMILY MEDICINE CLINIC | Age: 50
End: 2020-10-20
Payer: MEDICARE

## 2020-10-20 VITALS
SYSTOLIC BLOOD PRESSURE: 127 MMHG | HEART RATE: 66 BPM | BODY MASS INDEX: 26.53 KG/M2 | RESPIRATION RATE: 16 BRPM | TEMPERATURE: 97.9 F | HEIGHT: 67 IN | DIASTOLIC BLOOD PRESSURE: 77 MMHG | WEIGHT: 169 LBS | OXYGEN SATURATION: 98 %

## 2020-10-20 PROCEDURE — G8484 FLU IMMUNIZE NO ADMIN: HCPCS | Performed by: FAMILY MEDICINE

## 2020-10-20 PROCEDURE — G0439 PPPS, SUBSEQ VISIT: HCPCS | Performed by: FAMILY MEDICINE

## 2020-10-20 PROCEDURE — 3017F COLORECTAL CA SCREEN DOC REV: CPT | Performed by: FAMILY MEDICINE

## 2020-10-20 RX ORDER — M-VIT,TX,IRON,MINS/CALC/FOLIC 27MG-0.4MG
1 TABLET ORAL DAILY
COMMUNITY

## 2020-10-20 RX ORDER — HYDROCODONE BITARTRATE AND ACETAMINOPHEN 5; 325 MG/1; MG/1
TABLET ORAL
COMMUNITY
Start: 2020-10-07 | End: 2021-01-21

## 2020-10-20 SDOH — ECONOMIC STABILITY: TRANSPORTATION INSECURITY
IN THE PAST 12 MONTHS, HAS LACK OF TRANSPORTATION KEPT YOU FROM MEETINGS, WORK, OR FROM GETTING THINGS NEEDED FOR DAILY LIVING?: NO

## 2020-10-20 SDOH — ECONOMIC STABILITY: TRANSPORTATION INSECURITY
IN THE PAST 12 MONTHS, HAS THE LACK OF TRANSPORTATION KEPT YOU FROM MEDICAL APPOINTMENTS OR FROM GETTING MEDICATIONS?: NO

## 2020-10-20 ASSESSMENT — PATIENT HEALTH QUESTIONNAIRE - PHQ9
1. LITTLE INTEREST OR PLEASURE IN DOING THINGS: 0
SUM OF ALL RESPONSES TO PHQ QUESTIONS 1-9: 0
2. FEELING DOWN, DEPRESSED OR HOPELESS: 0
SUM OF ALL RESPONSES TO PHQ QUESTIONS 1-9: 0
SUM OF ALL RESPONSES TO PHQ9 QUESTIONS 1 & 2: 0
SUM OF ALL RESPONSES TO PHQ QUESTIONS 1-9: 0

## 2020-10-20 ASSESSMENT — LIFESTYLE VARIABLES: HOW OFTEN DO YOU HAVE A DRINK CONTAINING ALCOHOL: 1

## 2020-10-20 NOTE — PROGRESS NOTES
Medicare Annual Wellness Visit  Name: Kishan Romo Date: 10/20/2020   MRN: [id number] Sex: Female   Age: 48 y.o. Ethnicity: Non-/Non    : 1970 Race: Noel Urrutia is here for Medicare AWV    Screenings for behavioral, psychosocial and functional/safety risks, and cognitive dysfunction are all negative except as indicated below. These results, as well as other patient data from the [address] form, are documented in Flowsheets linked to this Encounter. No Known Allergies    Prior to Visit Medications    Medication Sig Taking? Authorizing Provider   Calcium Carb-Cholecalciferol (CALCIUM-VITAMIN D) 500-200 MG-UNIT per tablet Take 1 tablet by mouth 2 times daily (with meals) Yes Historical Provider, MD   BIOTIN PO Take by mouth Yes Historical Provider, MD   Multiple Vitamins-Minerals (THERAPEUTIC MULTIVITAMIN-MINERALS) tablet Take 1 tablet by mouth daily Yes Historical Provider, MD   HYDROcodone-acetaminophen (Cherylene Loveless) 5-325 MG per tablet  Yes Historical Provider, MD   meloxicam (MOBIC) 7.5 MG tablet Take 1 tablet by mouth 2 times daily Yes Jody Pedro MD   COLLAGEN PO Take 2,000 mg by mouth Yes Historical Provider, MD       Past Medical History:   Diagnosis Date    Hearing loss     congenital       No past surgical history on file.     Family History   Problem Relation Age of Onset    Hearing Loss Mother         deaf    Hearing Loss Father         deaf    Heart Disease Father         pacemaker       CareTeam (Including outside providers/suppliers regularly involved in providing care):   Patient Care Team:  Jody Pedro MD as PCP - General (Family Medicine)  Jody Pedro MD as PCP - REHABILITATION [facility] Empaneled Provider    Wt Readings from Last 3 Encounters:   10/20/20 169 lb (76.7 kg)   20 165 lb (74.8 kg)   19 147 lb 6.4 oz (66.9 kg)     Vitals:    10/20/20 1121 10/20/20 1141   BP: (!) 145/86 127/77   Pulse: 66    Resp: 16    Temp: 97.9 °F (36.6 °C)    TempSrc: Temporal    SpO2: 98%    Weight: 169 lb (76.7 kg)    Height: 5' 7\" (1.702 m)      Body mass index is 26.47 kg/m². Based upon direct observation of the patient, evaluation of cognition reveals recent and remote memory intact. General Appearance: alert and oriented to person, place and time, well developed and well- nourished, in no acute distress  Skin: warm and dry, no rash or erythema  Head: normocephalic and atraumatic  Eyes: pupils equal, round, and reactive to light, extraocular eye movements intact, conjunctivae normal  ENT: tympanic membrane, external ear and ear canal normal bilaterally, nose without deformity, nasal mucosa and turbinates normal without polyps  Neck: supple and non-tender without mass, no thyromegaly or thyroid nodules, no cervical lymphadenopathy  Pulmonary/Chest: clear to auscultation bilaterally- no wheezes, rales or rhonchi, normal air movement, no respiratory distress  Cardiovascular: normal rate, regular rhythm, normal S1 and S2, no murmurs, rubs, clicks, or gallops, distal pulses intact, no carotid bruits  Abdomen: soft, non-tender, non-distended, normal bowel sounds, no masses or organomegaly  Extremities: no cyanosis, clubbing or edema  Musculoskeletal: normal range of motion, no joint swelling, deformity or tenderness  Neurologic: reflexes normal and symmetric, no cranial nerve deficit, gait, coordination and speech normal    Patient's complete Health Risk Assessment and screening values have been reviewed and are found in Flowsheets. The following problems were reviewed today and where indicated follow up appointments were made and/or referrals ordered.     Positive Risk Factor Screenings with Interventions:     Substance History:  Social History     Tobacco History     Smoking Status  Former Smoker Quit date  1/10/2007    Smokeless Tobacco Use  Never Used          Alcohol History     Alcohol Use Status  Yes Comment  3 Seagrams per month          Drug Use Drug Use Status  Yes Types  Marijuana Frequency   1 time/week Comment  daily          Sexual Activity     Sexually Active  Yes Partners  Male               Alcohol Screening:       A score of 8 or more is associated with harmful or hazardous drinking. A score of 13 or more in women, and 15 or more in men, is likely to indicate alcohol dependence. Substance Abuse Interventions:  · Alcohol misuse/dependence:  patient is not ready to change his/her alcohol consumption behavior at this time    General Health and ACP:  General  In general, how would you say your health is?: Very Good  In the past 7 days, have you experienced any of the following?  New or Increased Pain, New or Increased Fatigue, Loneliness, Social Isolation, Stress or Anger?: (!) Stress  Do you get the social and emotional support that you need?: (!) No  Do you have a Living Will?: (!) No  Advance Directives     Power of  Living Will ACP-Advance Directive ACP-Power of     Not on File Not on File [address] [facility] Risk Interventions:  · No Living Will: Advance Care Planning addressed with patient today    Health Habits/Nutrition:  Health Habits/Nutrition  Do you exercise for at least 20 minutes 2-3 times per week?: Yes  Have you lost any weight without trying in the past 3 months?: No  Do you eat fewer than 2 meals per day?: No  Have you seen a dentist within the past year?: (!) No  Body mass index: (!) 26.47  Health Habits/Nutrition Interventions:  · Dental exam overdue:  patient encouraged to make appointment with his/her dentist    Hearing/Vision:  No exam data present  Hearing/Vision  Do you or your family notice any trouble with your hearing?: (!) Yes  Do you have difficulty driving, watching TV, or doing any of your daily activities because of your eyesight?: No  Have you had an eye exam within the past year?: Yes  Hearing/Vision Interventions:  · Hearing concerns:  patient declines any further evaluation/treatment

## 2020-10-20 NOTE — PATIENT INSTRUCTIONS
Personalized Preventive Plan for Jonna Diehl - 10/20/2020  Medicare offers a range of preventive health benefits. Some of the tests and screenings are paid in full while other may be subject to a deductible, co-insurance, and/or copay. Some of these benefits include a comprehensive review of your medical history including lifestyle, illnesses that may run in your family, and various assessments and screenings as appropriate. After reviewing your medical record and screening and assessments performed today your provider may have ordered immunizations, labs, imaging, and/or referrals for you. A list of these orders (if applicable) as well as your Preventive Care list are included within your After Visit Summary for your review. Other Preventive Recommendations:    · A preventive eye exam performed by an eye specialist is recommended every 1-2 years to screen for glaucoma; cataracts, macular degeneration, and other eye disorders. · A preventive dental visit is recommended every 6 months. · Try to get at least 150 minutes of exercise per week or 10,000 steps per day on a pedometer . · Order or download the FREE \"Exercise & Physical Activity: Your Everyday Guide\" from [employer]. Call 5-641.578.8035 or search [employer] online. · You need 4653-9092 mg of calcium and 2142-4260 IU of vitamin D per day. It is possible to meet your calcium requirement with diet alone, but a vitamin D supplement is usually necessary to meet this goal.  · When exposed to the sun, use a sunscreen that protects against both UVA and UVB radiation with an SPF of 30 or greater. Reapply every 2 to 3 hours or after sweating, drying off with a towel, or swimming. · Always wear a seat belt when traveling in a car. Always wear a helmet when riding a bicycle or motorcycle.

## 2021-01-25 ENCOUNTER — NURSE TRIAGE (OUTPATIENT)
Dept: OTHER | Facility: CLINIC | Age: 51
End: 2021-01-25

## 2021-01-25 ENCOUNTER — OFFICE VISIT (OUTPATIENT)
Dept: FAMILY MEDICINE CLINIC | Age: 51
End: 2021-01-25
Payer: MEDICARE

## 2021-01-25 ENCOUNTER — TELEPHONE (OUTPATIENT)
Dept: FAMILY MEDICINE CLINIC | Age: 51
End: 2021-01-25

## 2021-01-25 VITALS
BODY MASS INDEX: 27 KG/M2 | DIASTOLIC BLOOD PRESSURE: 60 MMHG | WEIGHT: 172 LBS | HEART RATE: 87 BPM | RESPIRATION RATE: 16 BRPM | OXYGEN SATURATION: 98 % | TEMPERATURE: 97.2 F | HEIGHT: 67 IN | SYSTOLIC BLOOD PRESSURE: 100 MMHG

## 2021-01-25 DIAGNOSIS — B02.9 HERPES ZOSTER WITHOUT COMPLICATION: Primary | ICD-10-CM

## 2021-01-25 PROCEDURE — G8427 DOCREV CUR MEDS BY ELIG CLIN: HCPCS | Performed by: FAMILY MEDICINE

## 2021-01-25 PROCEDURE — 99214 OFFICE O/P EST MOD 30 MIN: CPT | Performed by: FAMILY MEDICINE

## 2021-01-25 PROCEDURE — G8419 CALC BMI OUT NRM PARAM NOF/U: HCPCS | Performed by: FAMILY MEDICINE

## 2021-01-25 PROCEDURE — 3017F COLORECTAL CA SCREEN DOC REV: CPT | Performed by: FAMILY MEDICINE

## 2021-01-25 PROCEDURE — G8484 FLU IMMUNIZE NO ADMIN: HCPCS | Performed by: FAMILY MEDICINE

## 2021-01-25 PROCEDURE — 1036F TOBACCO NON-USER: CPT | Performed by: FAMILY MEDICINE

## 2021-01-25 RX ORDER — HYDROCODONE BITARTRATE AND ACETAMINOPHEN 5; 325 MG/1; MG/1
1 TABLET ORAL EVERY 4 HOURS PRN
Qty: 18 TABLET | Refills: 0 | Status: SHIPPED | OUTPATIENT
Start: 2021-01-25 | End: 2021-01-28

## 2021-01-25 RX ORDER — VALACYCLOVIR HYDROCHLORIDE 1 G/1
1000 TABLET, FILM COATED ORAL 3 TIMES DAILY
Qty: 20 TABLET | Refills: 0 | Status: SHIPPED | OUTPATIENT
Start: 2021-01-25 | End: 2021-02-01

## 2021-01-25 ASSESSMENT — PATIENT HEALTH QUESTIONNAIRE - PHQ9: SUM OF ALL RESPONSES TO PHQ QUESTIONS 1-9: 0

## 2021-01-25 NOTE — PROGRESS NOTES
2021    Chief Complaint   Patient presents with    Herpes Zoster     started last night very sore on back        HPI    Dora Spurger is a 48 y.o. patient that presents today for:    Herpes Zoster: Lesions on left back. Onset of symptoms was 1 day ago, unchanged since that time. Symptoms include irritability. Does trauma tend to precipitate a new lesion? No. What is the natural history of the lesions? Do they come and go No.? Come and persist? Yes. Any dysphagia, hoarseness, stridor, ocular irritation, dysuria, dyspareunia, hematuria)? No.    Patient's past medical, surgical, social and/or family history reviewed, updated in chart, and are non-contributory (unless otherwise stated). Medications and allergies also reviewed and updated in chart. ROS negative unless otherwise specified    Physical Exam  Temp Readings from Last 3 Encounters:   21 97.2 °F (36.2 °C)   21 97.3 °F (36.3 °C) (Temporal)   10/20/20 97.9 °F (36.6 °C) (Temporal)     Wt Readings from Last 3 Encounters:   21 172 lb (78 kg)   21 171 lb 9.6 oz (77.8 kg)   10/20/20 169 lb (76.7 kg)     BP Readings from Last 3 Encounters:   21 100/60   21 110/74   10/20/20 127/77     Pulse Readings from Last 3 Encounters:   21 87   10/20/20 66   20 79       General appearance: alert, well appearing, and in no distress, oriented to person, place, and time and normal appearing weight. CVS exam: normal rate, regular rhythm, normal S1, S2, no murmurs, rubs, clicks or gallops. Radial pulses 2+ bilateral.  PT/DP pulse 2+ bilat. No C/C/E    Chest: clear to auscultation, no wheezes, rales or rhonchi, symmetric air entry.      Abdomen: Soft, non-tender, non-distended, positive BS in all 4 quadrants    Extremities:Dorsalis pedis pulses palpated bilaterally, no clubbing, cyanosis, edema or erythema,     SKIN: no lesions, jaundice, petechiae, pallor, cyanosis, ecchymosis    NEURO: gross motor exam normal by observation, gait normal    Mental status - alert, oriented to person, place, and time, normal mood, behavior, speech, dress, motor activity, and thought processes      Assessment/Plan  Liudmila Ramos was seen today for herpes zoster. Diagnoses and all orders for this visit:    Herpes zoster without complication  -     valACYclovir (VALTREX) 1 g tablet; Take 1 tablet by mouth 3 times daily for 7 days  -     HYDROcodone-acetaminophen (NORCO) 5-325 MG per tablet; Take 1 tablet by mouth every 4 hours as needed for Pain (4-6 hours prn) for up to 3 days. Intended supply: 3 days. Take lowest dose possible to manage pain          Return in about 1 week (around 2/1/2021), or if symptoms worsen or fail to improve. Tiffany Unger, DO    Call or go to ED immediately if symptoms worsen or persist.    Educational materials and/or home exercises printed for patient's review and were included in patient instructions on his/her After Visit Summary and given to patient at the end of visit. Counseled regarding above diagnosis, including possible risks and complications,  especially if left uncontrolled. Counseled regarding the possible side effects, risks, benefits and alternatives to treatment; patient and/or guardian verbalizes understanding, agrees, feels comfortable with and wishes to proceed with above treatment plan. Advised patient to call with any new medication issues, and read all Rx info from pharmacy to assure aware of all possible risks and side effects of medication before taking. Reviewed age and gender appropriate health screening exams and vaccinations. Advised patient regarding importance of keeping up with recommended health maintenance and to schedule as soon as possible if overdue, as this is important in assessing for undiagnosed pathology, especially cancer, as well as protecting against potentially harmful/life threatening disease.       Patient and/or guardian verbalizes understanding and agrees with above counseling, assessment and plan. All questions answered.         HE AND YOUR MOTHER IN

## 2021-01-25 NOTE — TELEPHONE ENCOUNTER
Patient called pre-service center [facility] [place] with red flag complaint. Brief description of triage: rash to left back below shoulder blade that started last night, painful with touch and movement, denies itching. Patient concerned she has shingles, denies blistering at this time    Triage indicates for patient to be seen in office today    Care advice provided, patient verbalizes understanding; denies any other questions or concerns; instructed to call back for any new or worsening symptoms. Information placed in chat for [place]     Attention Provider: Thank you for allowing me to participate in the care of your patient. The patient was connected to triage in response to information provided to the [facility]. Please do not respond through this encounter as the response is not directed to a shared pool. Reason for Disposition   Painful rash with multiple small blisters grouped together (i.e., dermatomal distribution or 'band' or 'stripe')    Answer Assessment - Initial Assessment Questions  1. APPEARANCE of RASH: \"Describe the rash. \"       Started with pain to left back below shoulder blade    2. LOCATION: \"Where is the rash located? \"       Left back below shoulder seun    3. NUMBER: \"How many spots are there? \"   unsure    4. SIZE: \"How big are the spots? \" (Inches, centimeters or compare to size of a coin)       Approximately 4 inches     5. ONSET: \"When did the rash start? \"       Yesterday    6. ITCHING: \"Does the rash itch? \" If so, ask: \"How bad is the itch? \"  (Scale 1-10; or mild, moderate, severe)      No    7. PAIN: \"Does the rash hurt? \" If so, ask: \"How bad is the pain? \"  (Scale 1-10; or mild, moderate, severe)      Yes, unable to move arm due to pain    8. OTHER SYMPTOMS: \"Do you have any other symptoms? \" (e.g., fever)      no    9. PREGNANCY: \"Is there any chance you are pregnant? \" \"When was your last menstrual period? \"      No, LMP jan 17    Protocols used: RASH OR REDNESS - LOCALIZED-ADULT-[place]

## 2021-01-25 NOTE — TELEPHONE ENCOUNTER
Pt called through  line. She states last night she had pain upper left of back; today there is a rash in same spot and very painful. Call was transferred to triage nurse.

## 2021-02-02 ENCOUNTER — OFFICE VISIT (OUTPATIENT)
Dept: FAMILY MEDICINE CLINIC | Age: 51
End: 2021-02-02
Payer: MEDICARE

## 2021-02-02 VITALS
SYSTOLIC BLOOD PRESSURE: 127 MMHG | HEART RATE: 76 BPM | TEMPERATURE: 97.1 F | DIASTOLIC BLOOD PRESSURE: 79 MMHG | OXYGEN SATURATION: 99 % | RESPIRATION RATE: 16 BRPM

## 2021-02-02 DIAGNOSIS — B02.9 HERPES ZOSTER WITHOUT COMPLICATION: Primary | ICD-10-CM

## 2021-02-02 PROCEDURE — 1036F TOBACCO NON-USER: CPT | Performed by: FAMILY MEDICINE

## 2021-02-02 PROCEDURE — G8419 CALC BMI OUT NRM PARAM NOF/U: HCPCS | Performed by: FAMILY MEDICINE

## 2021-02-02 PROCEDURE — 3017F COLORECTAL CA SCREEN DOC REV: CPT | Performed by: FAMILY MEDICINE

## 2021-02-02 PROCEDURE — 99213 OFFICE O/P EST LOW 20 MIN: CPT | Performed by: FAMILY MEDICINE

## 2021-02-02 PROCEDURE — G8484 FLU IMMUNIZE NO ADMIN: HCPCS | Performed by: FAMILY MEDICINE

## 2021-02-02 PROCEDURE — G8427 DOCREV CUR MEDS BY ELIG CLIN: HCPCS | Performed by: FAMILY MEDICINE

## 2021-02-02 NOTE — PROGRESS NOTES
Chief Complaint   Patient presents with    Follow-up     shingles       HPI:  Patient is here for follow-up of shingles. Patient complains of continued soreness on back and difficulty laying on back. It is improving. It was 8/10 and now 4/10. She is done with Valtrex. Patient's past medical, surgical, social and/or family history reviewed, updated in chart, and are non-contributory (unless otherwise stated). Medications and allergies also reviewed and updated in chart. Review of Systems:  Constitutional:  No fever, no fatigue, no chills, no headaches, no weight change  Dermatology:  No rash, no mole, no dry or sensitive skin  ENT:  No cough, no sore throat, no sinus pain, no runny nose, no ear pain  Cardiology:  No chest pain, no palpitations, no leg edema, no shortness of breath, no PND  Gastroenterology:  No dysphagia, no abdominal pain, no nausea, no vomiting, no constipation, no diarrhea, no heartburn  Musculoskeletal:  No joint pain, no leg cramps, no back pain, no muscle aches  Respiratory:  No shortness of breath, no orthopnea, no wheezing, no CHOPRA, no hemoptysis  Urology:  No blood in the urine, no urinary frequency, no urinary incontinence, no urinary urgency, no nocturia, no dysuria  Vitals:    02/02/21 0955   BP: 127/79   Pulse: 76   Resp: 16   Temp: 97.1 °F (36.2 °C)   TempSrc: Temporal   SpO2: 99%       General:  Patient alert and oriented x 3, NAD, pleasant  HEENT:  Atraumatic, normocephalic, PERRLA, EOMI, clear conjunctiva, TMs clear, nose-clear, throat - no erythema  Neck:  Supple, no goiter, no carotid bruits, no lymphadenopathy  Lungs:  CTA B  Heart:  RRR, no murmurs, gallops or rubs  Abdomen:  Soft/nt/nd, + bowel sounds  Extremities:  No clubbing, cyanosis or edema  Skin: unremarkable    Assessment/Plan:      Stevie Bernheim was seen today for follow-up. Diagnoses and all orders for this visit:    Herpes zoster without complication    discussed need for Shingrix in the future.  Pt is agreeable. As above. Call or go to ED immediately if symptoms worsen or persist.  Return in about 6 months (around 8/2/2021). , or sooner if necessary. Educational materials and/or home exercises printed for patient's review and were included in patient instructions on his/her After Visit Summary and given to patient at the end of visit. Counseled regarding above diagnosis, including possible risks and complications,  especially if left uncontrolled. Counseled regarding the possible side effects, risks, benefits and alternatives to treatment; patient and/or guardian verbalizes understanding, agrees, feels comfortable with and wishes to proceed with above treatment plan. Advised patient to call with any new medication issues, and read all Rx info from pharmacy to assure aware of all possible risks and side effects of medication before taking. Reviewed age and gender appropriate health screening exams and vaccinations. Advised patient regarding importance of keeping up with recommended health maintenance and to schedule as soon as possible if overdue, as this is important in assessing for undiagnosed pathology, especially cancer, as well as protecting against potentially harmful/life threatening disease. Patient and/or guardian verbalizes understanding and agrees with above counseling, assessment and plan. All questions answered. Aj Jimenez MD  2/2/2021    I have personally reviewed and updated the chief complaint, HPI, Past Medical, Family and Social History, as well as the above Review of Systems.

## 2021-07-26 DIAGNOSIS — E78.5 HYPERLIPIDEMIA, UNSPECIFIED HYPERLIPIDEMIA TYPE: ICD-10-CM

## 2021-07-26 DIAGNOSIS — E03.9 HYPOTHYROIDISM, UNSPECIFIED TYPE: Primary | ICD-10-CM

## 2021-07-27 ENCOUNTER — NURSE ONLY (OUTPATIENT)
Dept: FAMILY MEDICINE CLINIC | Age: 51
End: 2021-07-27
Payer: MEDICARE

## 2021-07-27 DIAGNOSIS — E03.9 HYPOTHYROIDISM, UNSPECIFIED TYPE: ICD-10-CM

## 2021-07-27 DIAGNOSIS — E78.5 HYPERLIPIDEMIA, UNSPECIFIED HYPERLIPIDEMIA TYPE: ICD-10-CM

## 2021-07-27 PROCEDURE — 36415 COLL VENOUS BLD VENIPUNCTURE: CPT | Performed by: FAMILY MEDICINE

## 2021-07-28 LAB
ALBUMIN SERPL-MCNC: 4.5 G/DL (ref 3.5–5.2)
ALP BLD-CCNC: 50 U/L (ref 35–104)
ALT SERPL-CCNC: 19 U/L (ref 0–32)
ANION GAP SERPL CALCULATED.3IONS-SCNC: 4 MMOL/L (ref 7–16)
AST SERPL-CCNC: 26 U/L (ref 0–31)
BASOPHILS ABSOLUTE: 0.03 E9/L (ref 0–0.2)
BASOPHILS RELATIVE PERCENT: 0.4 % (ref 0–2)
BILIRUB SERPL-MCNC: 0.5 MG/DL (ref 0–1.2)
BUN BLDV-MCNC: 12 MG/DL (ref 6–20)
CALCIUM SERPL-MCNC: 9.8 MG/DL (ref 8.6–10.2)
CHLORIDE BLD-SCNC: 103 MMOL/L (ref 98–107)
CHOLESTEROL, TOTAL: 169 MG/DL (ref 0–199)
CO2: 32 MMOL/L (ref 22–29)
CREAT SERPL-MCNC: 0.8 MG/DL (ref 0.5–1)
EOSINOPHILS ABSOLUTE: 0.16 E9/L (ref 0.05–0.5)
EOSINOPHILS RELATIVE PERCENT: 1.9 % (ref 0–6)
GFR AFRICAN AMERICAN: >60
GFR NON-AFRICAN AMERICAN: >60 ML/MIN/1.73
GLUCOSE BLD-MCNC: 83 MG/DL (ref 74–99)
HCT VFR BLD CALC: 46.4 % (ref 34–48)
HDLC SERPL-MCNC: 57 MG/DL
HEMOGLOBIN: 14.8 G/DL (ref 11.5–15.5)
IMMATURE GRANULOCYTES #: 0.02 E9/L
IMMATURE GRANULOCYTES %: 0.2 % (ref 0–5)
LDL CHOLESTEROL CALCULATED: 99 MG/DL (ref 0–99)
LYMPHOCYTES ABSOLUTE: 1.72 E9/L (ref 1.5–4)
LYMPHOCYTES RELATIVE PERCENT: 20.4 % (ref 20–42)
MCH RBC QN AUTO: 30.6 PG (ref 26–35)
MCHC RBC AUTO-ENTMCNC: 31.9 % (ref 32–34.5)
MCV RBC AUTO: 96.1 FL (ref 80–99.9)
MONOCYTES ABSOLUTE: 0.5 E9/L (ref 0.1–0.95)
MONOCYTES RELATIVE PERCENT: 5.9 % (ref 2–12)
NEUTROPHILS ABSOLUTE: 6 E9/L (ref 1.8–7.3)
NEUTROPHILS RELATIVE PERCENT: 71.2 % (ref 43–80)
PDW BLD-RTO: 13.9 FL (ref 11.5–15)
PLATELET # BLD: 286 E9/L (ref 130–450)
PMV BLD AUTO: 10.1 FL (ref 7–12)
POTASSIUM SERPL-SCNC: 5.3 MMOL/L (ref 3.5–5)
RBC # BLD: 4.83 E12/L (ref 3.5–5.5)
SODIUM BLD-SCNC: 139 MMOL/L (ref 132–146)
TOTAL PROTEIN: 7.6 G/DL (ref 6.4–8.3)
TRIGL SERPL-MCNC: 67 MG/DL (ref 0–149)
TSH SERPL DL<=0.05 MIU/L-ACNC: 2.14 UIU/ML (ref 0.27–4.2)
VLDLC SERPL CALC-MCNC: 13 MG/DL
WBC # BLD: 8.4 E9/L (ref 4.5–11.5)

## 2021-08-16 ENCOUNTER — TELEPHONE (OUTPATIENT)
Dept: FAMILY MEDICINE CLINIC | Age: 51
End: 2021-08-16

## 2021-08-16 NOTE — TELEPHONE ENCOUNTER
----- Message from Kalen Bagley sent at 8/16/2021  1:22 PM EDT -----  Subject: Refill Request    QUESTIONS  Name of Medication? Other - oxycodone  Patient-reported dosage and instructions? 5mg  How many days do you have left? 0  Preferred Pharmacy? [address] phone number (if available)? [phone]  ---------------------------------------------------------------------------  --------------  CALL BACK INFO  What is the best way for the office to contact you? OK to leave message on   voicemail  Preferred Call Back Phone Number?  [phone]

## 2021-08-17 ENCOUNTER — HOSPITAL ENCOUNTER (EMERGENCY)
Age: 51
Discharge: HOME | End: 2021-08-17
Attending: EMERGENCY MEDICINE
Payer: MEDICARE

## 2021-08-17 ENCOUNTER — APPOINTMENT (OUTPATIENT)
Dept: GENERAL RADIOLOGY | Age: 51
End: 2021-08-17
Payer: MEDICARE

## 2021-08-17 VITALS
HEART RATE: 104 BPM | TEMPERATURE: 97.3 F | HEIGHT: 67 IN | DIASTOLIC BLOOD PRESSURE: 83 MMHG | WEIGHT: 150 LBS | SYSTOLIC BLOOD PRESSURE: 136 MMHG | BODY MASS INDEX: 23.54 KG/M2 | RESPIRATION RATE: 14 BRPM

## 2021-08-17 DIAGNOSIS — Z48.89 ENCOUNTER FOR POSTOPERATIVE WOUND CARE: Primary | ICD-10-CM

## 2021-08-17 DIAGNOSIS — T81.49XA WOUND INFECTION AFTER SURGERY: ICD-10-CM

## 2021-08-17 LAB
ALBUMIN SERPL-MCNC: 4.1 G/DL (ref 3.5–5.2)
ALP BLD-CCNC: 148 U/L (ref 35–104)
ALT SERPL-CCNC: 27 U/L (ref 0–32)
ANION GAP SERPL CALCULATED.3IONS-SCNC: 11 MMOL/L (ref 7–16)
AST SERPL-CCNC: 31 U/L (ref 0–31)
BASOPHILS ABSOLUTE: 0.05 E9/L (ref 0–0.2)
BASOPHILS RELATIVE PERCENT: 0.6 % (ref 0–2)
BILIRUB SERPL-MCNC: 0.4 MG/DL (ref 0–1.2)
BUN BLDV-MCNC: 14 MG/DL (ref 6–20)
C-REACTIVE PROTEIN: 0.6 MG/DL (ref 0–0.4)
CALCIUM SERPL-MCNC: 9.9 MG/DL (ref 8.6–10.2)
CHLORIDE BLD-SCNC: 98 MMOL/L (ref 98–107)
CO2: 27 MMOL/L (ref 22–29)
CREAT SERPL-MCNC: 0.6 MG/DL (ref 0.5–1)
EOSINOPHILS ABSOLUTE: 0.19 E9/L (ref 0.05–0.5)
EOSINOPHILS RELATIVE PERCENT: 2.4 % (ref 0–6)
GFR AFRICAN AMERICAN: >60
GFR NON-AFRICAN AMERICAN: >60 ML/MIN/1.73
GLUCOSE BLD-MCNC: 90 MG/DL (ref 74–99)
HCT VFR BLD CALC: 36 % (ref 34–48)
HEMOGLOBIN: 11.7 G/DL (ref 11.5–15.5)
IMMATURE GRANULOCYTES #: 0.07 E9/L
IMMATURE GRANULOCYTES %: 0.9 % (ref 0–5)
LACTIC ACID: 0.9 MMOL/L (ref 0.5–2.2)
LYMPHOCYTES ABSOLUTE: 1.42 E9/L (ref 1.5–4)
LYMPHOCYTES RELATIVE PERCENT: 18.1 % (ref 20–42)
MCH RBC QN AUTO: 31.8 PG (ref 26–35)
MCHC RBC AUTO-ENTMCNC: 32.5 % (ref 32–34.5)
MCV RBC AUTO: 97.8 FL (ref 80–99.9)
MONOCYTES ABSOLUTE: 0.51 E9/L (ref 0.1–0.95)
MONOCYTES RELATIVE PERCENT: 6.5 % (ref 2–12)
NEUTROPHILS ABSOLUTE: 5.62 E9/L (ref 1.8–7.3)
NEUTROPHILS RELATIVE PERCENT: 71.5 % (ref 43–80)
PDW BLD-RTO: 17 FL (ref 11.5–15)
PLATELET # BLD: 669 E9/L (ref 130–450)
PMV BLD AUTO: 8.4 FL (ref 7–12)
POTASSIUM SERPL-SCNC: 4 MMOL/L (ref 3.5–5)
RBC # BLD: 3.68 E12/L (ref 3.5–5.5)
SEDIMENTATION RATE, ERYTHROCYTE: 69 MM/HR (ref 0–20)
SODIUM BLD-SCNC: 136 MMOL/L (ref 132–146)
TOTAL CK: 33 U/L (ref 20–180)
TOTAL PROTEIN: 7.9 G/DL (ref 6.4–8.3)
WBC # BLD: 7.9 E9/L (ref 4.5–11.5)

## 2021-08-17 PROCEDURE — 73590 X-RAY EXAM OF LOWER LEG: CPT

## 2021-08-17 PROCEDURE — 80053 COMPREHEN METABOLIC PANEL: CPT

## 2021-08-17 PROCEDURE — 85651 RBC SED RATE NONAUTOMATED: CPT

## 2021-08-17 PROCEDURE — 82550 ASSAY OF CK (CPK): CPT

## 2021-08-17 PROCEDURE — 99283 EMERGENCY DEPT VISIT LOW MDM: CPT

## 2021-08-17 PROCEDURE — 85025 COMPLETE CBC W/AUTO DIFF WBC: CPT

## 2021-08-17 PROCEDURE — 83605 ASSAY OF LACTIC ACID: CPT

## 2021-08-17 PROCEDURE — 6370000000 HC RX 637 (ALT 250 FOR IP): Performed by: PHYSICIAN ASSISTANT

## 2021-08-17 PROCEDURE — 86140 C-REACTIVE PROTEIN: CPT

## 2021-08-17 RX ORDER — OXYCODONE HYDROCHLORIDE AND ACETAMINOPHEN 5; 325 MG/1; MG/1
1 TABLET ORAL EVERY 6 HOURS PRN
Qty: 12 TABLET | Refills: 0 | Status: SHIPPED | OUTPATIENT
Start: 2021-08-17 | End: 2021-08-23 | Stop reason: SDUPTHER

## 2021-08-17 RX ORDER — CEPHALEXIN 500 MG/1
500 CAPSULE ORAL ONCE
Status: DISCONTINUED | OUTPATIENT
Start: 2021-08-17 | End: 2021-08-17 | Stop reason: HOSPADM

## 2021-08-17 RX ORDER — CEPHALEXIN 500 MG/1
500 CAPSULE ORAL 4 TIMES DAILY
Qty: 40 CAPSULE | Refills: 0 | Status: SHIPPED | OUTPATIENT
Start: 2021-08-17 | End: 2021-08-27

## 2021-08-17 RX ORDER — OXYCODONE HYDROCHLORIDE AND ACETAMINOPHEN 5; 325 MG/1; MG/1
1 TABLET ORAL ONCE
Status: COMPLETED | OUTPATIENT
Start: 2021-08-17 | End: 2021-08-17

## 2021-08-17 RX ADMIN — OXYCODONE HYDROCHLORIDE AND ACETAMINOPHEN 1 TABLET: 5; 325 TABLET ORAL at 13:10

## 2021-08-17 ASSESSMENT — PAIN DESCRIPTION - PAIN TYPE: TYPE: ACUTE PAIN

## 2021-08-17 ASSESSMENT — PAIN DESCRIPTION - ORIENTATION: ORIENTATION: LEFT

## 2021-08-17 ASSESSMENT — PAIN DESCRIPTION - FREQUENCY: FREQUENCY: CONTINUOUS

## 2021-08-17 ASSESSMENT — PAIN DESCRIPTION - PROGRESSION: CLINICAL_PROGRESSION: NOT CHANGED

## 2021-08-17 ASSESSMENT — PAIN DESCRIPTION - LOCATION: LOCATION: LEG

## 2021-08-17 ASSESSMENT — PAIN SCALES - GENERAL: PAINLEVEL_OUTOF10: 8

## 2021-08-17 ASSESSMENT — PAIN DESCRIPTION - DESCRIPTORS: DESCRIPTORS: PATIENT UNABLE TO DESCRIBE
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
## 2021-08-17 NOTE — LETTER
[facility] Internal Medicine   [address] [facility], [address]      August 18, 2021    Dexter Rueda  [address]      Dear Matthew Hernández,    This letter is regarding your Emergency Department (ED) visit at [facility] Emergency Department on 8/17/21. Rashmi Marshall wanted to make sure that you understand your discharge instructions and that you were able to fill any prescriptions that may have been ordered for you. Please contact the office at \"[phone]  if the ED advised to you follow up with Rashmi Marshall, or if you have any further questions or needs. Also did you know -   *Visiting the ED for a non-emergency could result in higher co-pays than you would normally be subject to paying? *You can call your doctor even after hours so they can direct you to the most appropriate care. [facility]) practices can often offer you an appointment on the same day that you call. Many [address]  appointments; check our website for availability in your community , www. [employer]    Evisits are now available for patients for $36 through [employer] for certain conditions:  * Sinus, cold and or cough       * Diarrhea            * Headache  * Heartburn                                * Poison Maria Eugenia          * Back pain     * Urinary problems                         Sincerely,     Estefania Jaramillo MD and your [facility]

## 2021-08-17 NOTE — ED NOTES
This RN and fellow LPN assisted pt in removing old splint. Upon removal of old splint this RN witnessed a wound to the proximal posterior aspect of the LLE. The padding of the splint adhered to wound, this RN and fellow LPN gently removed adhered padding with saline. Provider notified. Pt tolerated well.       Meg Valadez, CATERINA  08/17/21 5836

## 2021-08-18 ENCOUNTER — VIRTUAL VISIT (OUTPATIENT)
Dept: FAMILY MEDICINE CLINIC | Age: 51
End: 2021-08-18
Payer: MEDICARE

## 2021-08-18 ENCOUNTER — TELEPHONE (OUTPATIENT)
Dept: ADMINISTRATIVE | Age: 51
End: 2021-08-18
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
## 2021-08-18 PROCEDURE — 99442 PR PHYS/QHP TELEPHONE EVALUATION 11-20 MIN: CPT | Performed by: FAMILY MEDICINE

## 2021-08-18 RX ORDER — OXYCODONE HYDROCHLORIDE 5 MG/1
TABLET ORAL
COMMUNITY
Start: 2021-08-10 | End: 2021-12-01

## 2021-08-18 RX ORDER — GABAPENTIN 400 MG/1
CAPSULE ORAL
COMMUNITY
Start: 2021-08-10

## 2021-08-18 SDOH — ECONOMIC STABILITY: FOOD INSECURITY: WITHIN THE PAST 12 MONTHS, YOU WORRIED THAT YOUR FOOD WOULD RUN OUT BEFORE YOU GOT MONEY TO BUY MORE.: NEVER TRUE

## 2021-08-18 SDOH — ECONOMIC STABILITY: FOOD INSECURITY: WITHIN THE PAST 12 MONTHS, THE FOOD YOU BOUGHT JUST DIDN'T LAST AND YOU DIDN'T HAVE MONEY TO GET MORE.: NEVER TRUE

## 2021-08-18 ASSESSMENT — SOCIAL DETERMINANTS OF HEALTH (SDOH): HOW HARD IS IT FOR YOU TO PAY FOR THE VERY BASICS LIKE FOOD, HOUSING, MEDICAL CARE, AND HEATING?: NOT HARD AT ALL

## 2021-08-18 NOTE — TELEPHONE ENCOUNTER
Patient seen at [facility] ED on 8/17 requesting follow-up with Dr. Lanre Brice. Please advise for scheduling. Okay to leave VM if patient does not answer, VM will go thru .

## 2021-08-18 NOTE — TELEPHONE ENCOUNTER
Chief Complaint:  Leg Pain (has cast on L leg from prev. injury, thinks cast is to tight. )    XR TIBIA FIBULA LEFT (2 VIEWS)   Final Result   1. There are no findings of osteomyelitis   2. Status post open reduction internal fixation of the distal fibula   3. Irregularity of the tibial spines. I cannot exclude a fracture of the   tibial spines. Please correlate with the patient's history and any symptoms. Routed to Providers for consideration and scheduling recommendations.

## 2021-08-19 NOTE — TELEPHONE ENCOUNTER
Call placed to pt, gave options to either f/u with operating surgeon or have operating surgeon send referral, op-note, disc with images for provider review so pt can be scheduled. She will contact surgeon and have referral sent.  Questions answered, will await referral.

## 2021-08-19 NOTE — TELEPHONE ENCOUNTER
Pt called in to check the status of this. She would like to speak with someone in the office for clarification.

## 2021-08-20 ENCOUNTER — TELEPHONE (OUTPATIENT)
Dept: FAMILY MEDICINE CLINIC | Age: 51
End: 2021-08-20

## 2021-08-20 DIAGNOSIS — Z48.89 ENCOUNTER FOR POSTOPERATIVE WOUND CARE: ICD-10-CM

## 2021-08-20 DIAGNOSIS — T81.49XA WOUND INFECTION AFTER SURGERY: ICD-10-CM
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
## 2021-08-23 RX ORDER — OXYCODONE HYDROCHLORIDE AND ACETAMINOPHEN 5; 325 MG/1; MG/1
1 TABLET ORAL EVERY 8 HOURS PRN
Qty: 9 TABLET | Refills: 0 | Status: SHIPPED | OUTPATIENT
Start: 2021-08-23 | End: 2021-08-26

## 2021-08-26 DIAGNOSIS — S82.112A DISPLACED FRACTURE OF LEFT TIBIAL SPINE, INITIAL ENCOUNTER FOR CLOSED FRACTURE: ICD-10-CM

## 2021-08-26 DIAGNOSIS — S82.892A CLOSED FRACTURE OF LEFT ANKLE, INITIAL ENCOUNTER: Primary | ICD-10-CM

## 2021-08-27 ENCOUNTER — HOSPITAL ENCOUNTER (OUTPATIENT)
Dept: GENERAL RADIOLOGY | Age: 51
Discharge: HOME | End: 2021-08-29
Payer: MEDICARE

## 2021-08-27 ENCOUNTER — OFFICE VISIT (OUTPATIENT)
Dept: ORTHOPEDIC SURGERY | Age: 51
End: 2021-08-27
Payer: MEDICARE

## 2021-08-27 VITALS — TEMPERATURE: 98.3 F

## 2021-08-27 DIAGNOSIS — Z98.890 S/P ANKLE LIGAMENT REPAIR: ICD-10-CM

## 2021-08-27 DIAGNOSIS — S82.112A DISPLACED FRACTURE OF LEFT TIBIAL SPINE, INITIAL ENCOUNTER FOR CLOSED FRACTURE: ICD-10-CM

## 2021-08-27 DIAGNOSIS — S82.115A NONDISPLACED FRACTURE OF LEFT TIBIAL SPINE, INITIAL ENCOUNTER FOR CLOSED FRACTURE: ICD-10-CM

## 2021-08-27 DIAGNOSIS — S82.892A CLOSED FRACTURE OF LEFT ANKLE, INITIAL ENCOUNTER: ICD-10-CM

## 2021-08-27 DIAGNOSIS — S82.892A CLOSED FRACTURE OF LEFT ANKLE, INITIAL ENCOUNTER: Primary | ICD-10-CM

## 2021-08-27 PROCEDURE — G8427 DOCREV CUR MEDS BY ELIG CLIN: HCPCS | Performed by: ORTHOPAEDIC SURGERY

## 2021-08-27 PROCEDURE — L4350 ANKLE CONTROL ORTHO PRE OTS: HCPCS | Performed by: ORTHOPAEDIC SURGERY

## 2021-08-27 PROCEDURE — 1036F TOBACCO NON-USER: CPT | Performed by: ORTHOPAEDIC SURGERY

## 2021-08-27 PROCEDURE — 99203 OFFICE O/P NEW LOW 30 MIN: CPT | Performed by: ORTHOPAEDIC SURGERY

## 2021-08-27 PROCEDURE — G8420 CALC BMI NORM PARAMETERS: HCPCS | Performed by: ORTHOPAEDIC SURGERY

## 2021-08-27 PROCEDURE — 73610 X-RAY EXAM OF ANKLE: CPT

## 2021-08-27 PROCEDURE — 99204 OFFICE O/P NEW MOD 45 MIN: CPT | Performed by: ORTHOPAEDIC SURGERY

## 2021-08-27 PROCEDURE — 73560 X-RAY EXAM OF KNEE 1 OR 2: CPT

## 2021-08-27 PROCEDURE — 3017F COLORECTAL CA SCREEN DOC REV: CPT | Performed by: ORTHOPAEDIC SURGERY

## 2021-08-27 RX ORDER — SULFAMETHOXAZOLE AND TRIMETHOPRIM 800; 160 MG/1; MG/1
1 TABLET ORAL 2 TIMES DAILY
Qty: 20 TABLET | Refills: 0 | Status: SHIPPED
Start: 2021-08-27 | End: 2021-11-18

## 2021-08-27 RX ORDER — CHOLECALCIFEROL (VITAMIN D3) 25 MCG
1 TABLET,CHEWABLE ORAL DAILY
Qty: 30 TABLET | Refills: 2 | Status: SHIPPED | OUTPATIENT
Start: 2021-08-27

## 2021-08-27 NOTE — PATIENT INSTRUCTIONS
Sutures to the ankle removed    Recommend to leave wounds to the left lower extremity dry when bathing, OK to clean around areas of wounds    Home care ordered, nursing to come to the house to help with wound care to the left knee  Antibiotics prescribed today, take all as prescribed- sent to [facility] with boot and knee brace, these are to be on when up and ambulatory  OK to remove when resting/overnight    Wet to dry dressings daily    Start vitamins as prescribed for wound healing (vitamin C, Zinc, Vitamin E, Vitamin B)    Follow up in 2 weeks for repeat wound check
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
## 2021-09-01 ENCOUNTER — TELEPHONE (OUTPATIENT)
Dept: ORTHOPEDIC SURGERY | Age: 51
End: 2021-09-01

## 2021-09-01 DIAGNOSIS — S82.112A DISPLACED FRACTURE OF LEFT TIBIAL SPINE, INITIAL ENCOUNTER FOR CLOSED FRACTURE: Primary | ICD-10-CM

## 2021-09-01 DIAGNOSIS — S82.892A CLOSED FRACTURE OF LEFT ANKLE, INITIAL ENCOUNTER: ICD-10-CM

## 2021-09-01 DIAGNOSIS — S82.115A NONDISPLACED FRACTURE OF LEFT TIBIAL SPINE, INITIAL ENCOUNTER FOR CLOSED FRACTURE: ICD-10-CM

## 2021-09-01 DIAGNOSIS — Z98.890 S/P ANKLE LIGAMENT REPAIR: ICD-10-CM

## 2021-09-01 NOTE — TELEPHONE ENCOUNTER
Patient called office inquiring on home health orders. She stated [facility] advised they do not have staffing available for order. Requesting order to be sent to another company. Order pended and routed at this time.

## 2021-09-03 ENCOUNTER — TELEPHONE (OUTPATIENT)
Dept: ORTHOPEDIC SURGERY | Age: 51
End: 2021-09-03

## 2021-09-03 NOTE — TELEPHONE ENCOUNTER
Received VM from April Suggs  with [facility]. Per Francisco Pearson they are unable to accept referral due to staff availability. Order faxed to [facility] at this time.

## 2021-09-03 NOTE — TELEPHONE ENCOUNTER
Received call from patient inquiring about [facility] RN's as she needs her dressings changed. Call placed to [facility], spoke with [facility]. They are unable to accept referral at this time. Referral faxed to [facility]. Call placed to pt, Vm left through . Notified PT referral faxed to I, their phone number provided. Pt can either obtain dressings (4x4, saline, kerlix gauze wrap, ACE) and apply daily or present to ED/UC for eval and dressing changes. Would call back if heard from [facility] before end of day, if not have pt reach out on Tuesday when office re-opens.

## 2021-09-07 NOTE — TELEPHONE ENCOUNTER
Received call from [facility] with [facility], they are out of network with patients insurance. Mireya Mckeon called [facility] with [facility], they are able to accept patient. Pam Díaz, med list, and [facility] at this [facility].

## 2021-09-07 NOTE — TELEPHONE ENCOUNTER
Call placed to pt, no answer. Vm left notifying her referral sent to [facility]. Their phone number provided to inquire about scheduling. Instructed to keep OV on 9/10 and to call office with any questions.

## 2021-09-10 ENCOUNTER — OFFICE VISIT (OUTPATIENT)
Dept: ORTHOPEDIC SURGERY | Age: 51
End: 2021-09-10
Payer: MEDICARE

## 2021-09-10 VITALS — TEMPERATURE: 97.4 F

## 2021-09-10 DIAGNOSIS — S82.115D NONDISPLACED FRACTURE OF LEFT TIBIAL SPINE, SUBSEQUENT ENCOUNTER FOR CLOSED FRACTURE WITH ROUTINE HEALING: ICD-10-CM

## 2021-09-10 DIAGNOSIS — S82.892D CLOSED LEFT ANKLE FRACTURE, WITH ROUTINE HEALING, SUBSEQUENT ENCOUNTER: Primary | ICD-10-CM

## 2021-09-10 PROCEDURE — 99213 OFFICE O/P EST LOW 20 MIN: CPT | Performed by: ORTHOPAEDIC SURGERY

## 2021-09-10 PROCEDURE — 99024 POSTOP FOLLOW-UP VISIT: CPT | Performed by: ORTHOPAEDIC SURGERY
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
## 2021-09-10 NOTE — PROGRESS NOTES
Megan Primrose is a 46 y.o. female who presents for follow up of left ankle, wound check    SURGEON: [facility] Jovanni Farley  Date of Injury/Surgery: 8/2/2021  Date last seen in office: 8/27/2021    Symptoms: better  New complaints: patient is concerned about her knee, as she does do some activities like kickball    Cast/Splint, Brace, or Dressings: Clean, dry and intact, Well fitting and Taken down for visit    Weightbearing: left lower Non-weight bearing      Assistive device Wheelchair  Participating in therapy (location if yes)? [address] [place] [address], [address] should be starting with patient next week    Refills Needed: None  Order/Referral Needed: Patient is out of her lovenox injections and wants to know if she should be on aspirin.  And she is also out of Gabapentin

## 2021-09-10 NOTE — PATIENT INSTRUCTIONS
Weightbearing: PWB    Activity restrictions: Limited activities. No heavy lifting. No strenuous exercise. Wound care: removal of existing dressing  visual inspection  Continue cleansing with hydrogen perioxide solution  application of topical medication bacitracin    Blood Clot Prevention: Lovenox    Swelling Control: Elevate leg(s) above the level of the heart when sitting     Pain Control:NSAID:     Therapy: outpatient therapy when     Continue: home wound care and home exercise program. Keep splint clean dry and intact. Remove boot when bathing and resting. Follow up: In 4 weeks    Please call the office at [phone] or send Siper[employer] message to providers sooner with any questions or concerns  Strongly recommend all of our patients sign up for [employer]perian in order to have direct communication VIA Siperian JIHAN with our clinic staff.

## 2021-09-17 DIAGNOSIS — S82.115D NONDISPLACED FRACTURE OF LEFT TIBIAL SPINE, SUBSEQUENT ENCOUNTER FOR CLOSED FRACTURE WITH ROUTINE HEALING: ICD-10-CM

## 2021-09-17 DIAGNOSIS — S82.892D CLOSED LEFT ANKLE FRACTURE, WITH ROUTINE HEALING, SUBSEQUENT ENCOUNTER: Primary | ICD-10-CM

## 2021-09-24 ENCOUNTER — HOSPITAL ENCOUNTER (OUTPATIENT)
Dept: GENERAL RADIOLOGY | Age: 51
Discharge: HOME | End: 2021-09-26
Payer: MEDICARE

## 2021-09-24 ENCOUNTER — OFFICE VISIT (OUTPATIENT)
Dept: ORTHOPEDIC SURGERY | Age: 51
End: 2021-09-24
Payer: MEDICARE

## 2021-09-24 VITALS — TEMPERATURE: 98.3 F

## 2021-09-24 DIAGNOSIS — S82.115D NONDISPLACED FRACTURE OF LEFT TIBIAL SPINE, SUBSEQUENT ENCOUNTER FOR CLOSED FRACTURE WITH ROUTINE HEALING: ICD-10-CM

## 2021-09-24 DIAGNOSIS — S82.892D CLOSED LEFT ANKLE FRACTURE, WITH ROUTINE HEALING, SUBSEQUENT ENCOUNTER: Primary | ICD-10-CM

## 2021-09-24 DIAGNOSIS — S82.892D CLOSED LEFT ANKLE FRACTURE, WITH ROUTINE HEALING, SUBSEQUENT ENCOUNTER: ICD-10-CM

## 2021-09-24 PROCEDURE — 73560 X-RAY EXAM OF KNEE 1 OR 2: CPT

## 2021-09-24 PROCEDURE — 99213 OFFICE O/P EST LOW 20 MIN: CPT | Performed by: ORTHOPAEDIC SURGERY

## 2021-09-24 PROCEDURE — 73610 X-RAY EXAM OF ANKLE: CPT

## 2021-09-24 PROCEDURE — 99024 POSTOP FOLLOW-UP VISIT: CPT | Performed by: ORTHOPAEDIC SURGERY

## 2021-09-24 NOTE — PATIENT INSTRUCTIONS
Weightbearing: TDWB, progressive WBAT over the next 6 weeks  Activity restrictions: perform activities as tolerated  No heavy lifting. No strenuous exercise. Wound care: removal of existing dressing  visual inspection  Blood Clot Prevention: Lovenox  Swelling Control: Elevate leg(s) above the level of the heart when sitting  Pain Control:NSAID:  Therapy: outpatient therapy when  Follow up: In 4 weeks  Please call the office at [phone] or send [employer] message to  providers sooner with any questions or concerns  Strongly recommend all of our patients sign up for [employer] in  order to have direct communication VIA [employer] JIHAN with our  clinic staff.     Evaluate and Treat 2-3 times per week for 6-8 weeks  Patient is PWB on Left Lower Extremity  Please follow Ankle protocol at the 6 week mary  ROM, Strengthening, Flexibility, Mobilization (soft tissue/joint), Functional Activity, Gait training/WB status, Edema control, Desensitization and Home Exercise Program    Please call with any questions or concerns
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
## 2021-09-28 ENCOUNTER — EVALUATION (OUTPATIENT)
Dept: PHYSICAL THERAPY | Age: 51
End: 2021-09-28
Payer: MEDICARE

## 2021-09-28 DIAGNOSIS — S82.892D CLOSED LEFT ANKLE FRACTURE, WITH ROUTINE HEALING, SUBSEQUENT ENCOUNTER: Primary | ICD-10-CM

## 2021-09-28 PROCEDURE — 97161 PT EVAL LOW COMPLEX 20 MIN: CPT | Performed by: PHYSICAL THERAPIST

## 2021-09-28 PROCEDURE — 97110 THERAPEUTIC EXERCISES: CPT | Performed by: PHYSICAL THERAPIST
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
## 2021-09-28 NOTE — PROGRESS NOTES
[address] [facility]   Phone: [phone]   Fax: [phone]      Physical Therapy Daily Treatment Note    Date: 2021  Patient Name: Adilia Bhatia  : 1970   MRN: [id number]  DOInjury:    DOSx:   Referring Provider: Maverick Trujillo DO  [address] [place] [address]. [place],   [facility]     Medical Diagnosis:     S82.892D (ICD-10-CM) - Closed left ankle fracture, with routine healing, subsequent encounter   S82.115D (ICD-10-CM) - Nondisplaced fracture of left tibial spine, subsequent encounter for closed fracture with routine healing         Outcome Measure: LEFS 35% limitation    S: see eval  O:   Time 230-300     Visit  Repeat outcome measure at mid point and end. Pain 0/10                       Modalities            Manual            Stretch      gastroc strap 10x10s holds  TE               Exercise      Ankle AROM x30 ea F/b, s/s, circles TE                                                                       NMR To improve balance for safe community and home ambulation    Resisted walk      FWD      BKWD      lat      March      Side stepping      Retro walk      Heel to toe      A:  Tolerated well.   Above added to written HEP and will perform daily   P: Continue with rehab plan  Ramsey Shah, PT DPT, PT [id number]    Treatment Charges: Mins Units   Initial Evaluation 20 1   Re-Evaluation     Ther Exercise         TE 10 1   Manual Therapy     MT     Ther Activities        TA     Gait Training          GT     Neuro Re-education NR     Modalities     Non-Billable Service Time     Other     Total Time/Units 30 2

## 2021-09-30 ENCOUNTER — TREATMENT (OUTPATIENT)
Dept: PHYSICAL THERAPY | Age: 51
End: 2021-09-30
Payer: MEDICARE

## 2021-09-30 DIAGNOSIS — S82.892D CLOSED LEFT ANKLE FRACTURE, WITH ROUTINE HEALING, SUBSEQUENT ENCOUNTER: Primary | ICD-10-CM

## 2021-09-30 PROCEDURE — 97112 NEUROMUSCULAR REEDUCATION: CPT | Performed by: PHYSICAL THERAPIST

## 2021-09-30 PROCEDURE — 97110 THERAPEUTIC EXERCISES: CPT | Performed by: PHYSICAL THERAPIST

## 2021-09-30 NOTE — PROGRESS NOTES
[address] [facility]   Phone: [phone]   Fax: [phone]      Physical Therapy Daily Treatment Note    Date: 2021  Patient Name: Cyndi Habermann  : 1970   MRN: [id number]  DOInjury:    DOSx:   Referring Provider: Suzy Tompkins DO  [facility]. [place],   [facility]     Medical Diagnosis:     S82.892D (ICD-10-CM) - Closed left ankle fracture, with routine healing, subsequent encounter   S82.115D (ICD-10-CM) - Nondisplaced fracture of left tibial spine, subsequent encounter for closed fracture with routine healing         Outcome Measure: LEFS 35% limitation    S: see eval  O:   Time 230-300     Visit  Repeat outcome measure at mid point and end. Pain 0/10                       Modalities            Manual            Stretch      gastroc strap 10x10s holds  TE               Exercise      Ankle AROM x30 ea F/b, s/s, circles TE   bosu AROM x30 ea  TE               Standing SLR x30 B c B HHA  NR   Marching  x30 B c B HHA NR   Mini squats x10 c B HHA NR                                   NMR To improve balance for safe community and home ambulation    Resisted walk      FWD      BKWD      lat      March      Side stepping      Retro walk      Heel to toe      A:  Tolerated well. Pt was progressed c functional mobility and general ROM today. She tolerated the session well today c moderate fatigue and no incr in pain. She is progressing well overall.   P: Continue with rehab plan  Juan Rios, PT DPT, PT [id number]    Treatment Charges: Mins Units   Initial Evaluation     Re-Evaluation     Ther Exercise         TE 25 2   Manual Therapy     MT     Ther Activities        TA     Gait Training          GT     Neuro Re-education NR 15 1   Modalities     Non-Billable Service Time     Other     Total Time/Units 40 3

## 2021-10-05 ENCOUNTER — TREATMENT (OUTPATIENT)
Dept: PHYSICAL THERAPY | Age: 51
End: 2021-10-05
Payer: MEDICARE

## 2021-10-05 DIAGNOSIS — S82.892D CLOSED LEFT ANKLE FRACTURE, WITH ROUTINE HEALING, SUBSEQUENT ENCOUNTER: Primary | ICD-10-CM

## 2021-10-05 PROCEDURE — 97110 THERAPEUTIC EXERCISES: CPT | Performed by: PHYSICAL THERAPIST

## 2021-10-05 PROCEDURE — 97112 NEUROMUSCULAR REEDUCATION: CPT | Performed by: PHYSICAL THERAPIST

## 2021-10-05 NOTE — PROGRESS NOTES
[facility]   Phone: [phone]   Fax: [phone]      Physical Therapy Daily Treatment Note    Date: 10/5/2021  Patient Name: Eleanor Valles  : 1970   MRN: [id number]  DOInjury:    DOSx:   Referring Provider: Michael Berumen DO  [address] [place] [address]. [place] Pablo,   [facility]     Medical Diagnosis:     S82.892D (ICD-10-CM) - Closed left ankle fracture, with routine healing, subsequent encounter   S82.115D (ICD-10-CM) - Nondisplaced fracture of left tibial spine, subsequent encounter for closed fracture with routine healing         Outcome Measure: LEFS 35% limitation    S: pt reports that she is compliant c HEP. She is ambulating c slight limp on L c [employer]  O:   Time 7927-3990     Visit  Repeat outcome measure at mid point and end. Pain 0/10                       Modalities            Manual            Stretch      gastroc strap 20x10s holds  TE   Seated HS stretch 20x10s holds incr in knee pain TE         Exercise      Ankle AROM x30 ea F/b, s/s, circles TE   bosu AROM x30 ea  TE               Standing SLR x30 B c B HHA flex/ext/abd NR   Marching  x30 B c B HHA NR   c B HHA NR         QS NEXT                         NMR To improve balance for safe community and home ambulation    Resisted walk      FWD      BKWD      lat      March      Side stepping      Retro walk      Heel to toe      A:  Tolerated well. Pt was progressed today c ROM activity and functional strength. She has some anterior knee pain c seated HS stretching today.    P: Continue with rehab plan  Irene Norton, PT DPT, PT [id number]    Treatment Charges: Mins Units   Initial Evaluation     Re-Evaluation     Ther Exercise         TE 29 2   Manual Therapy     MT     Ther Activities        TA     Gait Training          GT     Neuro Re-education NR 10 1   Modalities     Non-Billable Service Time     Other     Total Time/Units 39 3

## 2021-10-07 ENCOUNTER — TREATMENT (OUTPATIENT)
Dept: PHYSICAL THERAPY | Age: 51
End: 2021-10-07
Payer: MEDICARE

## 2021-10-07 DIAGNOSIS — S82.892D CLOSED LEFT ANKLE FRACTURE, WITH ROUTINE HEALING, SUBSEQUENT ENCOUNTER: Primary | ICD-10-CM

## 2021-10-07 PROCEDURE — 97112 NEUROMUSCULAR REEDUCATION: CPT | Performed by: PHYSICAL THERAPIST

## 2021-10-07 PROCEDURE — 97110 THERAPEUTIC EXERCISES: CPT | Performed by: PHYSICAL THERAPIST

## 2021-10-07 NOTE — PROGRESS NOTES
[facility]   Phone: [phone]   Fax: [phone]      Physical Therapy Daily Treatment Note    Date: 10/7/2021  Patient Name: Shanda Frey  : 1970   MRN: [id number]  DOInjury:    DOSx:   Referring Provider: Nishant Leonardo, DO  [address] [facility]. [place],   [facility]     Medical Diagnosis:     S82.892D (ICD-10-CM) - Closed left ankle fracture, with routine healing, subsequent encounter   S82.115D (ICD-10-CM) - Nondisplaced fracture of left tibial spine, subsequent encounter for closed fracture with routine healing         Outcome Measure: LEFS 35% limitation    S: pt reports that she is compliant c HEP. She is ambulating c slight limp on L c [employer]  O:   Time 9938-0497     Visit  Repeat outcome measure at mid point and end. Pain 0/10                       Modalities            Manual            Stretch      gastroc strap 20x10s holds  TE   incr in knee pain TE         Exercise      Ankle AROM x30 ea F/b, s/s, circles TE   bosu AROM x30 ea  TE               Standing SLR x30 B c B HHA flex/ext/abd NR   Marching  x30 B c B HHA NR   c B HHA NR         QS 3x10 3s holds  TE   Seated march 3x10 3s holds  TE   Hs curl 3x10 3s holds  TE           NMR To improve balance for safe community and home ambulation    Resisted walk      FWD      BKWD      lat      March      Side stepping      Retro walk      Heel to toe      A:  Tolerated well. Pt progressed c knee and hip strength to further improve functional mobility. She tolerated the session well c moderate fatigue and no incr in pain.   P: Continue with rehab plan  Rosalina Daley, PT DPT, PT [id number]    Treatment Charges: Mins Units   Initial Evaluation     Re-Evaluation     Ther Exercise         TE 30 2   Manual Therapy     MT     Ther Activities        TA     Gait Training          GT     Neuro Re-education NR 10 1   Modalities     Non-Billable Service Time     Other     Total Time/Units 40 3

## 2021-10-12 ENCOUNTER — TREATMENT (OUTPATIENT)
Dept: PHYSICAL THERAPY | Age: 51
End: 2021-10-12
Payer: MEDICARE

## 2021-10-12 DIAGNOSIS — S82.892D CLOSED LEFT ANKLE FRACTURE, WITH ROUTINE HEALING, SUBSEQUENT ENCOUNTER: Primary | ICD-10-CM

## 2021-10-12 PROCEDURE — 97110 THERAPEUTIC EXERCISES: CPT | Performed by: PHYSICAL THERAPIST

## 2021-10-14 ENCOUNTER — TREATMENT (OUTPATIENT)
Dept: PHYSICAL THERAPY | Age: 51
End: 2021-10-14
Payer: MEDICARE

## 2021-10-14 DIAGNOSIS — S82.892D CLOSED LEFT ANKLE FRACTURE, WITH ROUTINE HEALING, SUBSEQUENT ENCOUNTER: Primary | ICD-10-CM

## 2021-10-14 PROCEDURE — 97112 NEUROMUSCULAR REEDUCATION: CPT | Performed by: PHYSICAL THERAPIST

## 2021-10-14 PROCEDURE — 97110 THERAPEUTIC EXERCISES: CPT | Performed by: PHYSICAL THERAPIST

## 2021-10-15 DIAGNOSIS — E78.5 HYPERLIPIDEMIA, UNSPECIFIED HYPERLIPIDEMIA TYPE: Primary | ICD-10-CM

## 2021-10-15 DIAGNOSIS — E03.9 HYPOTHYROIDISM, UNSPECIFIED TYPE: ICD-10-CM

## 2021-10-18 ENCOUNTER — NURSE ONLY (OUTPATIENT)
Dept: FAMILY MEDICINE CLINIC | Age: 51
End: 2021-10-18
Payer: MEDICARE

## 2021-10-18 DIAGNOSIS — E78.5 HYPERLIPIDEMIA, UNSPECIFIED HYPERLIPIDEMIA TYPE: ICD-10-CM

## 2021-10-18 DIAGNOSIS — E03.9 HYPOTHYROIDISM, UNSPECIFIED TYPE: ICD-10-CM

## 2021-10-18 PROCEDURE — 36415 COLL VENOUS BLD VENIPUNCTURE: CPT | Performed by: FAMILY MEDICINE

## 2021-10-19 ENCOUNTER — TREATMENT (OUTPATIENT)
Dept: PHYSICAL THERAPY | Age: 51
End: 2021-10-19
Payer: MEDICARE

## 2021-10-19 DIAGNOSIS — E03.9 HYPOTHYROIDISM, UNSPECIFIED TYPE: ICD-10-CM

## 2021-10-19 DIAGNOSIS — S82.892D CLOSED LEFT ANKLE FRACTURE, WITH ROUTINE HEALING, SUBSEQUENT ENCOUNTER: Primary | ICD-10-CM

## 2021-10-19 DIAGNOSIS — E78.5 HYPERLIPIDEMIA, UNSPECIFIED HYPERLIPIDEMIA TYPE: ICD-10-CM

## 2021-10-19 LAB
ALBUMIN SERPL-MCNC: 4.4 G/DL (ref 3.5–5.2)
ALP BLD-CCNC: 87 U/L (ref 35–104)
ALT SERPL-CCNC: 11 U/L (ref 0–32)
ANION GAP SERPL CALCULATED.3IONS-SCNC: 13 MMOL/L (ref 7–16)
AST SERPL-CCNC: 16 U/L (ref 0–31)
BASOPHILS ABSOLUTE: 0.03 E9/L (ref 0–0.2)
BASOPHILS RELATIVE PERCENT: 0.4 % (ref 0–2)
BILIRUB SERPL-MCNC: 0.4 MG/DL (ref 0–1.2)
BUN BLDV-MCNC: 11 MG/DL (ref 6–20)
CALCIUM SERPL-MCNC: 9.9 MG/DL (ref 8.6–10.2)
CHLORIDE BLD-SCNC: 99 MMOL/L (ref 98–107)
CHOLESTEROL, TOTAL: 206 MG/DL (ref 0–199)
CO2: 24 MMOL/L (ref 22–29)
CREAT SERPL-MCNC: 0.6 MG/DL (ref 0.5–1)
EOSINOPHILS ABSOLUTE: 0.09 E9/L (ref 0.05–0.5)
EOSINOPHILS RELATIVE PERCENT: 1.3 % (ref 0–6)
GFR AFRICAN AMERICAN: >60
GFR NON-AFRICAN AMERICAN: >60 ML/MIN/1.73
GLUCOSE BLD-MCNC: 86 MG/DL (ref 74–99)
HCT VFR BLD CALC: 43.3 % (ref 34–48)
HDLC SERPL-MCNC: 55 MG/DL
HEMOGLOBIN: 14.5 G/DL (ref 11.5–15.5)
IMMATURE GRANULOCYTES #: 0.03 E9/L
IMMATURE GRANULOCYTES %: 0.4 % (ref 0–5)
LDL CHOLESTEROL CALCULATED: 119 MG/DL (ref 0–99)
LYMPHOCYTES ABSOLUTE: 1.67 E9/L (ref 1.5–4)
LYMPHOCYTES RELATIVE PERCENT: 23.7 % (ref 20–42)
MCH RBC QN AUTO: 29.6 PG (ref 26–35)
MCHC RBC AUTO-ENTMCNC: 33.5 % (ref 32–34.5)
MCV RBC AUTO: 88.4 FL (ref 80–99.9)
MONOCYTES ABSOLUTE: 0.5 E9/L (ref 0.1–0.95)
MONOCYTES RELATIVE PERCENT: 7.1 % (ref 2–12)
NEUTROPHILS ABSOLUTE: 4.72 E9/L (ref 1.8–7.3)
NEUTROPHILS RELATIVE PERCENT: 67.1 % (ref 43–80)
PDW BLD-RTO: 12.4 FL (ref 11.5–15)
PLATELET # BLD: 296 E9/L (ref 130–450)
PMV BLD AUTO: 9.5 FL (ref 7–12)
POTASSIUM SERPL-SCNC: 4.3 MMOL/L (ref 3.5–5)
RBC # BLD: 4.9 E12/L (ref 3.5–5.5)
SODIUM BLD-SCNC: 136 MMOL/L (ref 132–146)
TOTAL PROTEIN: 7 G/DL (ref 6.4–8.3)
TRIGL SERPL-MCNC: 158 MG/DL (ref 0–149)
TSH SERPL DL<=0.05 MIU/L-ACNC: 2.72 UIU/ML (ref 0.27–4.2)
VLDLC SERPL CALC-MCNC: 32 MG/DL
WBC # BLD: 7 E9/L (ref 4.5–11.5)

## 2021-10-19 PROCEDURE — 97110 THERAPEUTIC EXERCISES: CPT | Performed by: PHYSICAL THERAPIST

## 2021-10-19 PROCEDURE — 97112 NEUROMUSCULAR REEDUCATION: CPT | Performed by: PHYSICAL THERAPIST

## 2021-10-19 PROCEDURE — 97530 THERAPEUTIC ACTIVITIES: CPT | Performed by: PHYSICAL THERAPIST

## 2021-10-19 NOTE — PROGRESS NOTES
[address]   Phone: [phone]   Fax: [phone]      Physical Therapy Daily Treatment Note    Date: 10/19/2021  Patient Name: Brendan Smith  : 1970   MRN: [id number]  DOInjury:    DOSx:   Referring Provider: Rudolph Proctor DO  [address] [place] [address]. [place],   [facility]     Medical Diagnosis:     S82.892D (ICD-10-CM) - Closed left ankle fracture, with routine healing, subsequent encounter   S82.115D (ICD-10-CM) - Nondisplaced fracture of left tibial spine, subsequent encounter for closed fracture with routine healing         Outcome Measure: LEFS 35% limitation    S: Pt reports she is ambulating part time s AD and has been compliant c brace/boot. She is noting continued restriction c ankle ROM. O:   Time 9262-3340     Visit  Repeat outcome measure at mid point and end. Pain 0/10                       Modalities            Manual            Stretch      gastroc strap 20x10s holds  TE   Seated HS stretch 20x10s holds i TE         Exercise      Ankle AROM c tband x30 ea BTB 4 directions TE    TE         TKE x30 BTB NR   Standing SLR x30 B c B HHA flex/ext/abd TA   c B HHA NR   c B HHA NR   Step ups forward x30 B 4 inch TA   CNP d/t pain TE    TE   Hs curl standing 3x10 3s holds BTB NR   BTB TE     NMR To improve balance for safe community and home ambulation    Resisted walk      FWD      BKWD      lat      March      Side stepping      Retro walk      Heel to toe      A:  Tolerated well. Pt was progressed today c functional strength and mobility. She is progressing well and is returning to physician Friday for evaluation and probable progression.   P: Continue with rehab plan  Ursula Nguyen, PT DPT, PT [id number]    Treatment Charges: Mins Units   Initial Evaluation     Re-Evaluation     Ther Exercise         TE 15 1   Manual Therapy     MT     Ther Activities        TA 10 1   Gait Training          GT     Neuro Re-education NR 15 1   Modalities     Non-Billable Service Time     Other     Total Time/Units 40 3

## 2021-10-21 ENCOUNTER — TREATMENT (OUTPATIENT)
Dept: PHYSICAL THERAPY | Age: 51
End: 2021-10-21
Payer: MEDICARE

## 2021-10-21 DIAGNOSIS — S82.892D CLOSED LEFT ANKLE FRACTURE, WITH ROUTINE HEALING, SUBSEQUENT ENCOUNTER: Primary | ICD-10-CM

## 2021-10-21 PROCEDURE — 97112 NEUROMUSCULAR REEDUCATION: CPT | Performed by: PHYSICAL THERAPIST

## 2021-10-21 PROCEDURE — 97110 THERAPEUTIC EXERCISES: CPT | Performed by: PHYSICAL THERAPIST

## 2021-10-21 PROCEDURE — 97530 THERAPEUTIC ACTIVITIES: CPT | Performed by: PHYSICAL THERAPIST

## 2021-10-21 NOTE — PROGRESS NOTES
[facility]   Phone: [phone]   Fax: [phone]      Physical Therapy Daily Treatment Note    Date: 10/21/2021  Patient Name: Leroy Farah  : 1970   MRN: [id number]  DOInjury:    DOSx:   Referring Provider: You Weaver DO  [address] [facility]. [facility],  [address] [place] [address]     Medical Diagnosis:     S82.892D (ICD-10-CM) - Closed left ankle fracture, with routine healing, subsequent encounter   S82.115D (ICD-10-CM) - Nondisplaced fracture of left tibial spine, subsequent encounter for closed fracture with routine healing         Outcome Measure: LEFS 35% limitation    S: Pt reports that she feels she is improving c ambulation, still restricted c ankle mobility. O:   Time 7435-9955     Visit  Repeat outcome measure at mid point and end. Pain 0/10                       Modalities            Manual            Stretch      gastroc strap 20x10s holds  TE   Seated HS stretch 20x10s holds i TE         Exercise      Ankle AROM c tband x30 ea BTB 4 directions TE   bosu AROM x30 ea  TE   LAQ 3x10  TE   TKE x30 BTB NR   Standing SLR x30 B c B HHA flex/ext/abd TA   c B HHA NR   c B HHA NR   4 inch TA   CNP d/t pain TE    TE   Hs curl standing 3x10 3s holds BTB NR   BTB TE   Standing march x30 B  TA   Mini squats 3x10  TA   Standing HS curl 3x10 B  NR                     A:  Tolerated well. She was progressed today c functional strength to improve ambulation. She tolerated the session well c moderate fatigue. She continues to be limited by anterior knee pain at inferior pole of patella.   P: Continue with rehab plan  Ventura William, PT DPT, PT [id number]    Treatment Charges: Mins Units   Initial Evaluation     Re-Evaluation     Ther Exercise         TE 15 1   Manual Therapy     MT     Ther Activities        TA 10 1   Gait Training          GT     Neuro Re-education NR 15 1   Modalities     Non-Billable Service Time     Other     Total Time/Units 40 3

## 2021-10-26 ENCOUNTER — OFFICE VISIT (OUTPATIENT)
Dept: FAMILY MEDICINE CLINIC | Age: 51
End: 2021-10-26
Payer: MEDICARE

## 2021-10-26 VITALS
RESPIRATION RATE: 16 BRPM | DIASTOLIC BLOOD PRESSURE: 78 MMHG | HEIGHT: 67 IN | WEIGHT: 154 LBS | SYSTOLIC BLOOD PRESSURE: 123 MMHG | OXYGEN SATURATION: 99 % | TEMPERATURE: 97.4 F | HEART RATE: 78 BPM | BODY MASS INDEX: 24.17 KG/M2

## 2021-10-26 DIAGNOSIS — Z00.00 ROUTINE GENERAL MEDICAL EXAMINATION AT A HEALTH CARE FACILITY: ICD-10-CM

## 2021-10-26 DIAGNOSIS — Z12.31 ENCOUNTER FOR SCREENING MAMMOGRAM FOR BREAST CANCER: ICD-10-CM

## 2021-10-26 PROCEDURE — G8484 FLU IMMUNIZE NO ADMIN: HCPCS | Performed by: FAMILY MEDICINE

## 2021-10-26 PROCEDURE — G0439 PPPS, SUBSEQ VISIT: HCPCS | Performed by: FAMILY MEDICINE

## 2021-10-26 PROCEDURE — 3017F COLORECTAL CA SCREEN DOC REV: CPT | Performed by: FAMILY MEDICINE

## 2021-10-26 ASSESSMENT — LIFESTYLE VARIABLES
HOW OFTEN DO YOU HAVE A DRINK CONTAINING ALCOHOL: 1
HOW OFTEN DURING THE LAST YEAR HAVE YOU HAD A FEELING OF GUILT OR REMORSE AFTER DRINKING: 0
HOW MANY STANDARD DRINKS CONTAINING ALCOHOL DO YOU HAVE ON A TYPICAL DAY: 0
HOW OFTEN DO YOU HAVE SIX OR MORE DRINKS ON ONE OCCASION: 0
AUDIT TOTAL SCORE: 1
HOW OFTEN DURING THE LAST YEAR HAVE YOU NEEDED AN ALCOHOLIC DRINK FIRST THING IN THE MORNING TO GET YOURSELF GOING AFTER A NIGHT OF HEAVY DRINKING: 0
AUDIT-C TOTAL SCORE: 1
HOW OFTEN DURING THE LAST YEAR HAVE YOU FAILED TO DO WHAT WAS NORMALLY EXPECTED FROM YOU BECAUSE OF DRINKING: 0
HAS A RELATIVE, FRIEND, DOCTOR, OR ANOTHER HEALTH PROFESSIONAL EXPRESSED CONCERN ABOUT YOUR DRINKING OR SUGGESTED YOU CUT DOWN: 0
HOW OFTEN DURING THE LAST YEAR HAVE YOU BEEN UNABLE TO REMEMBER WHAT HAPPENED THE NIGHT BEFORE BECAUSE YOU HAD BEEN DRINKING: 0
HAVE YOU OR SOMEONE ELSE BEEN INJURED AS A RESULT OF YOUR DRINKING: 0
HOW OFTEN DURING THE LAST YEAR HAVE YOU FOUND THAT YOU WERE NOT ABLE TO STOP DRINKING ONCE YOU HAD STARTED: 0

## 2021-10-26 ASSESSMENT — PATIENT HEALTH QUESTIONNAIRE - PHQ9
SUM OF ALL RESPONSES TO PHQ9 QUESTIONS 1 & 2: 0
SUM OF ALL RESPONSES TO PHQ QUESTIONS 1-9: 0
1. LITTLE INTEREST OR PLEASURE IN DOING THINGS: 0
SUM OF ALL RESPONSES TO PHQ QUESTIONS 1-9: 0
SUM OF ALL RESPONSES TO PHQ QUESTIONS 1-9: 0
2. FEELING DOWN, DEPRESSED OR HOPELESS: 0

## 2021-10-26 NOTE — PROGRESS NOTES
Medicare Annual Wellness Visit  Name: Lulú Loo Date: 10/26/2021   MRN: [id number] Sex: Female   Age: 46 y.o. Ethnicity: Non- / Non    : 1970 Race: White (non-)      Sami Florez is here for Medicare AWV and Discuss Labs    Screenings for behavioral, psychosocial and functional/safety risks, and cognitive dysfunction are all negative except as indicated below. These results, as well as other patient data from the [address] form, are documented in Flowsheets linked to this Encounter. No Known Allergies      Prior to Visit Medications    Medication Sig Taking? Authorizing Provider   LYSINE PO Take by mouth Yes Historical Provider, MD   B Complex-C-E-Zn (STRESS B-COMPLEX/VIT C/ZINC) TABS Take 1 tablet by mouth daily Yes Karley Rosa PA-C   calcium carbonate (OSCAL) 500 MG TABS tablet Take 500 mg by mouth daily  Yes Historical Provider, MD   Biotin 1 MG CAPS Take by mouth  Yes Historical Provider, MD   sulfamethoxazole-trimethoprim (BACTRIM DS) 800-160 MG per tablet Take 1 tablet by mouth 2 times daily  Patient not taking: Reported on 9/10/2021  Karley Rosa PA-C   oxyCODONE (ROXICODONE) 5 MG immediate release tablet   Historical Provider, MD   gabapentin (NEURONTIN) 400 MG capsule   Historical Provider, MD   enoxaparin (LOVENOX) 30 MG/0.3ML injection   Historical Provider, MD   Multiple Vitamins-Minerals (THERAPEUTIC MULTIVITAMIN-MINERALS) tablet Take 1 tablet by mouth daily   Historical Provider, MD         Past Medical History:   Diagnosis Date    Hearing loss     congenital       No past surgical history on file.       Family History   Problem Relation Age of Onset    Hearing Loss Mother         deaf    Hearing Loss Father         deaf    Heart Disease Father         pacemaker       CareTeam (Including outside providers/suppliers regularly involved in providing care):   Patient Care Team:  Deonte Avelar MD as PCP - General (Family Interventions:         Substance History:  Social History     Tobacco History     Smoking Status  Former Smoker Quit date  1/10/2007 Smoking Frequency  1 pack/day for 20 years (20 pk yrs) Smoking Tobacco Type  Cigarettes    Smokeless Tobacco Use  Never Used          Alcohol History     Alcohol Use Status  Yes Comment  3 Seagrams per month          Drug Use     Drug Use Status  Yes Types  Marijuana Comment  daily          Sexual Activity     Sexually Active  Yes Partners  Male               Alcohol Screening: Audit-C Score: 1  Total Score: 1    A score of 8 or more is associated with harmful or hazardous drinking. A score of 13 or more in women, and 15 or more in men, is likely to indicate alcohol dependence. Substance Abuse Interventions:  · none    General Health and ACP:  General  In general, how would you say your health is?: Very Good  In the past 7 days, have you experienced any of the following?  New or Increased Pain, New or Increased Fatigue, Loneliness, Social Isolation, Stress or Anger?: (!) Social Isolation  Do you get the social and emotional support that you need?: Yes  Do you have a Living Will?: (!) No  Advance Directives     Power of [address] [place] [address] Will ACP-Advance Directive ACP-Power of     Not on File Not on File Not on File Not on File      General Health Risk Interventions:  · Social isolation: patient declines any further intervention for this issue    Health Habits/Nutrition:  Health Habits/Nutrition  Do you exercise for at least 20 minutes 2-3 times per week?: (!) No  Have you lost any weight without trying in the past 3 months?: (!) Yes  Do you eat only one meal per day?: No  Have you seen the dentist within the past year?: (!) No  Body mass index: 24.12  Health Habits/Nutrition Interventions:  · Inadequate physical activity:  patient is not ready to increase his/her physical activity level at this time  · Dental exam overdue:  patient encouraged to make appointment with his/her dentist      ADL:  ADLs  In the past 7 days, did you need help from others to perform any of the following everyday activities? Eating, dressing, grooming, bathing, toileting, or walking/balance?: None  In the past 7 days, did you need help from others to take care of any of the following? Laundry, housekeeping, banking/finances, shopping, telephone use, food preparation, transportation, or taking medications?: (!) Laundry, Housekeeping, Banking/Finances  ADL Interventions:  · Patient declines any further evaluation/treatment for this issue    Personalized Preventive Plan   Current Health Maintenance Status  Immunization History   Administered Date(s) Administered    COVID-19, J&J, PF, 0.5 mL 03/29/2021    Tdap (Boostrix, Adacel) 09/19/2019        Health Maintenance   Topic Date Due    Hepatitis C screen  Never done    Colon cancer screen colonoscopy  Never done    Shingles Vaccine (1 of 2) Never done    Breast cancer screen  11/20/2020    Flu vaccine (1) Never done    Annual Wellness Visit (AWV)  10/21/2021    Low dose CT lung screening  07/31/2022    Cervical cancer screen  01/21/2024    Lipid screen  10/19/2026    DTaP/Tdap/Td vaccine (2 - Td or Tdap) 09/19/2029    COVID-19 Vaccine  Completed    HIV screen  Completed    Hepatitis A vaccine  Aged Out    Hepatitis B vaccine  Aged Out    Hib vaccine  Aged Out    Meningococcal (ACWY) vaccine  Aged Out    Pneumococcal 0-64 years Vaccine  Aged Out     Recommendations for [employer] Due: see orders and patient instructions/AVS.  . Recommended screening schedule for the next 5-10 years is provided to the patient in written form: see Patient Instructions/AVS.    Won Holt was seen today for medicare awv and discuss labs. Diagnoses and all orders for this visit:    Encounter for screening mammogram for breast cancer  -     EVAN Digital Screen Bilateral [[id number]];  Future    Routine general medical examination at a health care facility

## 2021-10-26 NOTE — PATIENT INSTRUCTIONS
Personalized Preventive Plan for Ashley Olguin - 10/26/2021  Medicare offers a range of preventive health benefits. Some of the tests and screenings are paid in full while other may be subject to a deductible, co-insurance, and/or copay. Some of these benefits include a comprehensive review of your medical history including lifestyle, illnesses that may run in your family, and various assessments and screenings as appropriate. After reviewing your medical record and screening and assessments performed today your provider may have ordered immunizations, labs, imaging, and/or referrals for you. A list of these orders (if applicable) as well as your Preventive Care list are included within your After Visit Summary for your review. Other Preventive Recommendations:    · A preventive eye exam performed by an eye specialist is recommended every 1-2 years to screen for glaucoma; cataracts, macular degeneration, and other eye disorders. · A preventive dental visit is recommended every 6 months. · Try to get at least 150 minutes of exercise per week or 10,000 steps per day on a pedometer . · Order or download the FREE \"Exercise & Physical Activity: Your Everyday Guide\" from [employer]. Call 5-722.664.3063 or search [employer] online. · You need 1890-3436 mg of calcium and 2942-0112 IU of vitamin D per day. It is possible to meet your calcium requirement with diet alone, but a vitamin D supplement is usually necessary to meet this goal.  · When exposed to the sun, use a sunscreen that protects against both UVA and UVB radiation with an SPF of 30 or greater. Reapply every 2 to 3 hours or after sweating, drying off with a towel, or swimming. · Always wear a seat belt when traveling in a car. Always wear a helmet when riding a bicycle or motorcycle.

## 2021-10-27 ENCOUNTER — TREATMENT (OUTPATIENT)
Dept: PHYSICAL THERAPY | Age: 51
End: 2021-10-27
Payer: MEDICARE

## 2021-10-27 DIAGNOSIS — S82.115D NONDISPLACED FRACTURE OF LEFT TIBIAL SPINE, SUBSEQUENT ENCOUNTER FOR CLOSED FRACTURE WITH ROUTINE HEALING: ICD-10-CM

## 2021-10-27 DIAGNOSIS — S82.892D CLOSED LEFT ANKLE FRACTURE, WITH ROUTINE HEALING, SUBSEQUENT ENCOUNTER: Primary | ICD-10-CM

## 2021-10-27 PROCEDURE — 97530 THERAPEUTIC ACTIVITIES: CPT | Performed by: PHYSICAL THERAPIST

## 2021-10-27 PROCEDURE — 97110 THERAPEUTIC EXERCISES: CPT | Performed by: PHYSICAL THERAPIST

## 2021-10-27 PROCEDURE — 97112 NEUROMUSCULAR REEDUCATION: CPT | Performed by: PHYSICAL THERAPIST

## 2021-10-27 NOTE — PROGRESS NOTES
[facility]   Phone: [phone]   Fax: [phone]      Physical Therapy Daily Treatment Note    Date: 10/27/2021  Patient Name: Kayley Jones  : 1970   MRN: [id number]  DOInjury:    DOSx:   Referring Provider: Froylan Payan DO  [address] [facility]. [facility],  [address] [place] [address]     Medical Diagnosis:     S82.892D (ICD-10-CM) - Closed left ankle fracture, with routine healing, subsequent encounter   S82.115D (ICD-10-CM) - Nondisplaced fracture of left tibial spine, subsequent encounter for closed fracture with routine healing         Outcome Measure: LEFS 35% limitation    S: Pt reports that she is ambulating better at this time and is able to ambulate c braces but no AD.  O:   Time 3509-3078     Visit 8 Repeat outcome measure at mid point and end. Pain 0/10                       Modalities            Manual            Stretch      gastroc strap 20x10s holds  TE   i TE         Exercise      Ankle AROM c tband x30 ea BTB 4 directions TE    TE   LAQ 3x10  TE   TKE x30 BTB NR   Standing SLR x30 B c B HHA flex/ext/abd TA   c B HHA NR   c B HHA NR   4 inch TA   CNP d/t pain TE    TE   Hs curl standing 3x10 3s holds BTB NR   BTB TE   Standing march x30 B  TA   Mini squats 3x10  TA   Standing HS curl 3x10 B  NR   Resisted step x30 B 3 direction YTB TA               A:  Tolerated well. Pt was progressed today c functional knee stability and is demonstrating improved gait mechanics. She tolerated the session well c moderate fatigue. She returning to surgeon in 2 days and will progress to next phase of rehab if cleared at that time.   P: Continue with rehab plan  Priscila Hutchinson, PT DPT, PT [id number]    Treatment Charges: Mins Units   Initial Evaluation     Re-Evaluation     Ther Exercise         TE 15 1   Manual Therapy     MT     Ther Activities        TA 10 1   Gait Training          GT     Neuro Re-education NR 15 1   Modalities     Non-Billable Service Time     Other     Total Time/Units 40 3

## 2021-10-29 ENCOUNTER — OFFICE VISIT (OUTPATIENT)
Dept: ORTHOPEDIC SURGERY | Age: 51
End: 2021-10-29
Payer: MEDICARE

## 2021-10-29 ENCOUNTER — HOSPITAL ENCOUNTER (OUTPATIENT)
Dept: GENERAL RADIOLOGY | Age: 51
Discharge: HOME | End: 2021-10-31
Payer: MEDICARE

## 2021-10-29 VITALS — WEIGHT: 155 LBS | HEIGHT: 67 IN | BODY MASS INDEX: 24.33 KG/M2

## 2021-10-29 DIAGNOSIS — S82.892D CLOSED LEFT ANKLE FRACTURE, WITH ROUTINE HEALING, SUBSEQUENT ENCOUNTER: ICD-10-CM

## 2021-10-29 DIAGNOSIS — S82.115D NONDISPLACED FRACTURE OF LEFT TIBIAL SPINE, SUBSEQUENT ENCOUNTER FOR CLOSED FRACTURE WITH ROUTINE HEALING: ICD-10-CM

## 2021-10-29 DIAGNOSIS — S82.892D CLOSED LEFT ANKLE FRACTURE, WITH ROUTINE HEALING, SUBSEQUENT ENCOUNTER: Primary | ICD-10-CM

## 2021-10-29 PROCEDURE — 1036F TOBACCO NON-USER: CPT | Performed by: ORTHOPAEDIC SURGERY

## 2021-10-29 PROCEDURE — 73610 X-RAY EXAM OF ANKLE: CPT

## 2021-10-29 PROCEDURE — 3017F COLORECTAL CA SCREEN DOC REV: CPT | Performed by: ORTHOPAEDIC SURGERY

## 2021-10-29 PROCEDURE — G8420 CALC BMI NORM PARAMETERS: HCPCS | Performed by: ORTHOPAEDIC SURGERY

## 2021-10-29 PROCEDURE — 99213 OFFICE O/P EST LOW 20 MIN: CPT | Performed by: ORTHOPAEDIC SURGERY

## 2021-10-29 PROCEDURE — 73560 X-RAY EXAM OF KNEE 1 OR 2: CPT

## 2021-10-29 PROCEDURE — 99212 OFFICE O/P EST SF 10 MIN: CPT

## 2021-10-29 PROCEDURE — G8484 FLU IMMUNIZE NO ADMIN: HCPCS | Performed by: ORTHOPAEDIC SURGERY

## 2021-10-29 PROCEDURE — G8427 DOCREV CUR MEDS BY ELIG CLIN: HCPCS | Performed by: ORTHOPAEDIC SURGERY

## 2021-10-29 PROCEDURE — 6370000000 HC RX 637 (ALT 250 FOR IP)

## 2021-10-29 NOTE — PROGRESS NOTES
Orthopaedic Clinic Note     S: Kayla Mon is a 46 y.o. female, she is here today for her 12 wk follow-up from a ORIF L ankle, and wound check. Patient is doing well. Physical therapy is progressing well without pain. No pain reported. she is currently taking OTC NSAIDs for pain as need and Lovenox for DVT prophylaxis. ROS:  Denies fever, chills and recent illness. Denies CP, SOB and calf pain. Denies issues with bowel or bladder. No other orthopedic complaints at this time    Patient Active Problem List   Diagnosis    Closed left ankle fracture, with routine healing, subsequent encounter    Nondisplaced fracture of left tibial spine, subsequent encounter for closed fracture with routine healing       Physical Exam    Ht 5' 7\" (1.702 m)   Wt 155 lb (70.3 kg)   BMI 24.28 kg/m²     O: Alert and oriented X 3, no acute distress, respirations easy and unlabored with no audible wheezes, skin warm and dry, speech and dress appropriate for noted age, affect euthymic. Left Lower Extremity Exam:  Surgical Incision over lateral malleolus is well healed. No erythema, induration, or fluctuance. Skin intact . Mild swelling present about the ankle, mild swelling around the proximal tibia. No ecchymosis present. Wound healing extremely well, some granulation tissue present, No erythema, little drainage from peeled scab. No tenderness about the knee and ankle    Demonstrates active knee flexion/extension, ankle plantar/dorsiflexion/great toe extension. States sensation intact to touch in sural/deep peroneal/superficial peroneal/saphenous/posterior tibial nerve distributions to foot/ankle. Palpable dorsalis pedis and posterior tibialis pulses, cap refill brisk in toes, foot warm/perfused.   Compartments supple throughout thigh and leg, calves supple/NT      Xrays Reviewed  Left ankle X-ray 10/29/2021  Left knee X-ray 10/29/2021    A:     ICD-10-CM    1. Closed left ankle fracture, with routine healing, subsequent encounter  S82.287R    2. Nondisplaced fracture of left tibial spine, subsequent encounter for closed fracture with routine healing  S82.115D        P:   Weight bearing as tolerated to left lower extremity  Patient can progress with physical therapy with gait training, and A/PROM as tolerated. CAM boot and hinge brace removed in clinic and patient instructed to wear boot with prolong ambulation or if feeling fatigue  Dressing replaced and bacitracin applied with x4 gauze and ace wrap. Patient instructed to continue wound care  Continue OTC pain management  Continue DVT prophylaxis  Follow up in office in 3 months for Left knee and ankle X-rays, and wound check. Patient seen and examined along with resident today. Her wound on her proximal tibia is improving significantly. His left knee is stable to varus valgus and anterior posterior draw. Her ankle has minimal tenderness to palpation and x-rays do appear that is healing well. Plan will be to start weightbearing as tolerated and get her out of her boot and brace. We will continue dressing changes for proximal wound which is healing very nicely. We will see her back in 3 months time we told to call sooner if she has increasing pain or her wound would continue improving. She is agreeable with the plan. She was seen along with a  today.

## 2021-10-29 NOTE — PROGRESS NOTES
Patient is here for a 8 week follow up on ORIF L lat mal, repair L ankle ATFL, L tibial spine fx, DOS 08/02/2021. Patient has minimal to no pain.  Kae.          Electronically signed by Mahi Diego MA on 10/29/2021 at 8:21 AM

## 2021-10-29 NOTE — PATIENT INSTRUCTIONS
Weightbearing: WBAT    Activity restrictions: Pt is to increase activities as tolerated. Wound care: application of topical medication Bacitracin    Blood Clot Prevention: Lovenox    Swelling Control: Elevate leg(s) above the level of the heart when sitting  and Other:  Compressive stockings    Pain Control:NSAID: OTC    Therapy: outpatient therapy    Continue: To weight bear as tolerated to the left lower extremity. Patient can progress with physical therapy: gait training and weaning off assistive devices. A/PROM as tolerated. Continue to follow [facility] ankle fracture rehab guidelines    Follow up: In 3 months    Please call the office at [phone] or send TwtBks message to providers sooner with any questions or concerns  Strongly recommend all of our patients sign up for TwtBks in order to have direct communication VIA [employer] JIHAN with our clinic staff.

## 2021-11-03 ENCOUNTER — TREATMENT (OUTPATIENT)
Dept: PHYSICAL THERAPY | Age: 51
End: 2021-11-03
Payer: MEDICARE

## 2021-11-03 DIAGNOSIS — S82.892D CLOSED LEFT ANKLE FRACTURE, WITH ROUTINE HEALING, SUBSEQUENT ENCOUNTER: Primary | ICD-10-CM

## 2021-11-03 PROCEDURE — 97112 NEUROMUSCULAR REEDUCATION: CPT | Performed by: PHYSICAL THERAPIST

## 2021-11-03 PROCEDURE — 97110 THERAPEUTIC EXERCISES: CPT | Performed by: PHYSICAL THERAPIST

## 2021-11-03 NOTE — PROGRESS NOTES
[address] [facility]   Phone: [phone]   Fax: [phone]      Physical Therapy Daily Treatment Note    Date: 11/3/2021  Patient Name: Anthony Cummings  : 1970   MRN: [id number]  DOInjury:    DOSx:   Referring Provider: Tamiko Palomino DO  [address] [facility]. [facility],  [address] [place] [address]     Medical Diagnosis:     S82.892D (ICD-10-CM) - Closed left ankle fracture, with routine healing, subsequent encounter   S82.115D (ICD-10-CM) - Nondisplaced fracture of left tibial spine, subsequent encounter for closed fracture with routine healing         Outcome Measure: LEFS 35% limitation    S: Pt reports that she had good follow up c surgeon and is able to d/c brace and WBAT s boot. She is currently wearing boot full time still d/t fear of progressing too quickly  O:   Time 3601-8042     Visit 9 Repeat outcome measure at mid point and end. Pain 0/10                       Modalities            Manual            Stretch      gastroc strap 20x10s holds  TE   i TE         Exercise      Ankle AROM c tband x30 ea BTB 4 directions TE    TE    TE   TKE x30 BTB NR   Standing SLR x30 B c B HHA flex/ext/abd TA   c B HHA NR   c B HHA NR   4 inch TA   CNP d/t pain TE    TE   Hs curl standing 3x10 3s holds BTB NR   BTB TE    TA    TA    NR   3 direction YTB TA         Step onto foam s boot x30 forward/ lateral c WS NR   A:  Tolerated well. Pt progressed today c proprioceptive activity and functional mobility to improve gait and ADL capacity. She tolerated the session well c moderate fatigue and no incr in pain. She is progressing well and ambulation c minor gait deviation. Will begin weaning from boot in the next 1-2 sessions.   P: Continue with rehab plan  Grace Bobo, PT DPT, PT [id number]    Treatment Charges: Mins Units   Initial Evaluation     Re-Evaluation     Ther Exercise         TE 15 1   Manual Therapy     MT     Ther Activities        TA     Gait Training          GT     Neuro Re-education NR 25 2 Modalities     Non-Billable Service Time     Other     Total Time/Units 40 3

## 2021-11-08 ENCOUNTER — TREATMENT (OUTPATIENT)
Dept: PHYSICAL THERAPY | Age: 51
End: 2021-11-08
Payer: MEDICARE

## 2021-11-08 DIAGNOSIS — S82.892D CLOSED LEFT ANKLE FRACTURE, WITH ROUTINE HEALING, SUBSEQUENT ENCOUNTER: Primary | ICD-10-CM

## 2021-11-08 PROCEDURE — 97112 NEUROMUSCULAR REEDUCATION: CPT | Performed by: PHYSICAL THERAPIST

## 2021-11-08 PROCEDURE — 97110 THERAPEUTIC EXERCISES: CPT | Performed by: PHYSICAL THERAPIST

## 2021-11-10 ENCOUNTER — TREATMENT (OUTPATIENT)
Dept: PHYSICAL THERAPY | Age: 51
End: 2021-11-10
Payer: MEDICARE

## 2021-11-10 DIAGNOSIS — S82.892D CLOSED LEFT ANKLE FRACTURE, WITH ROUTINE HEALING, SUBSEQUENT ENCOUNTER: Primary | ICD-10-CM

## 2021-11-10 PROCEDURE — 97110 THERAPEUTIC EXERCISES: CPT | Performed by: PHYSICAL THERAPIST

## 2021-11-10 PROCEDURE — 97112 NEUROMUSCULAR REEDUCATION: CPT | Performed by: PHYSICAL THERAPIST

## 2021-11-10 PROCEDURE — 97530 THERAPEUTIC ACTIVITIES: CPT | Performed by: PHYSICAL THERAPIST

## 2021-11-10 NOTE — PROGRESS NOTES
Neuro Re-education NR 15 1   Modalities     Non-Billable Service Time     Other     Total Time/Units 40 3

## 2021-11-18 ENCOUNTER — TREATMENT (OUTPATIENT)
Dept: PHYSICAL THERAPY | Age: 51
End: 2021-11-18
Payer: MEDICARE

## 2021-11-18 ENCOUNTER — TELEPHONE (OUTPATIENT)
Dept: ADMINISTRATIVE | Age: 51
End: 2021-11-18

## 2021-11-18 DIAGNOSIS — S82.892D CLOSED LEFT ANKLE FRACTURE, WITH ROUTINE HEALING, SUBSEQUENT ENCOUNTER: Primary | ICD-10-CM

## 2021-11-18 DIAGNOSIS — T14.8XXA NONHEALING NONSURGICAL WOUND: Primary | ICD-10-CM

## 2021-11-18 PROCEDURE — 97112 NEUROMUSCULAR REEDUCATION: CPT | Performed by: PHYSICAL THERAPIST

## 2021-11-18 PROCEDURE — 97110 THERAPEUTIC EXERCISES: CPT | Performed by: PHYSICAL THERAPIST

## 2021-11-18 RX ORDER — SULFAMETHOXAZOLE AND TRIMETHOPRIM 800; 160 MG/1; MG/1
1 TABLET ORAL 2 TIMES DAILY
Qty: 20 TABLET | Refills: 0 | Status: SHIPPED
Start: 2021-11-18 | End: 2021-12-08

## 2021-11-18 NOTE — TELEPHONE ENCOUNTER
Patient seen her PT today and was advised to follow-up with Dr. Victoria Gonsalves. Patient states her leg developed an infection and she started treating it with an ointment. She now has a pencil eraser size hole that has developed. States hole is deep. Please advise for scheduling.

## 2021-11-18 NOTE — TELEPHONE ENCOUNTER
Call placed to patient, Patient denies any drainage, fever or chills. Ointment being used is Santyl from a while ago her MIL used before she had passed away. She felt it was helping, but then she stopped using it because she noticed it might of been causing a hole in her leg.

## 2021-11-18 NOTE — TELEPHONE ENCOUNTER
Picture provided does not allow for context as to where wound is  Patient needs to stop Santyl immediately  Dry dressings daily to this area  Antibiotic prescribed  Referral to wound care initiated  We can see her in 10-12 days for wound check          Electronically signed by Jonathan Holt PA-C on 11/18/2021 at 3:21 PM

## 2021-11-18 NOTE — TELEPHONE ENCOUNTER
Call placed to pt, given recommendations per Ольга Orantes PA-C. She verbalized understanding, questions answered.

## 2021-11-18 NOTE — PROGRESS NOTES
[address] [facility] [address] [facility] [address] [facility]   Phone: [phone]   Fax: [phone]      Physical Therapy Daily Treatment Note    Date: 2021  Patient Name: Sukhdev Evangelista  : 1970   MRN: [id number]  DOInjury:    DOSx:   Referring Provider: Ammon Noland DO  [address] [facility]. [facility],  [address] [place] [address]     Medical Diagnosis:     S82.892D (ICD-10-CM) - Closed left ankle fracture, with routine healing, subsequent encounter   S82.115D (ICD-10-CM) - Nondisplaced fracture of left tibial spine, subsequent encounter for closed fracture with routine healing         Outcome Measure: LEFS 35% limitation    S: Pt reports she is noting improvement in ROM in ankle today. She has a question about wound on her L LE.    O:   Time 4164-4495     Visit 12 Repeat outcome measure at mid point and end. Pain 0/10                       Modalities            Manual            Stretch      gastroc strap 20x10s holds  TE   i TE         Exercise      BTB 4 directions TE    TE    TE   BTB NR   Standing SLR x30 B c B HHA flex TA   c B HHA NR   c B HHA NR   4 inch TA   CNP d/t pain TE    TE   BTB NR   BTB TE    TA    TA    NR   Resisted step x30 B 3 direction YTB in shoe TA   foam NR    NR    TA         c WS NR   A:  Tolerated well. Wound inspected and was found to be tunneling into the gastroc muscle. She was educated to call physician post session today to be advised as to what to do next. She was progressed c ROM and functional strength today to further improve mobility and strength.   P: Continue with rehab plan  [place], PT DPT, PT [id number]    Treatment Charges: Mins Units   Initial Evaluation     Re-Evaluation     Ther Exercise         TE 15 1   Manual Therapy     MT     Ther Activities        TA 5 0   Gait Training          GT     Neuro Re-education NR 15 1   Modalities     Non-Billable Service Time     Other     Total Time/Units 35 2

## 2021-12-01 ENCOUNTER — HOSPITAL ENCOUNTER (OUTPATIENT)
Age: 51
Discharge: HOME | End: 2021-12-03

## 2021-12-01 ENCOUNTER — HOSPITAL ENCOUNTER (OUTPATIENT)
Dept: WOUND CARE | Age: 51
Discharge: HOME | End: 2021-12-01
Payer: MEDICARE

## 2021-12-01 ENCOUNTER — HOSPITAL ENCOUNTER (OUTPATIENT)
Age: 51
End: 2021-12-01
Payer: MEDICARE

## 2021-12-01 ENCOUNTER — HOSPITAL ENCOUNTER (OUTPATIENT)
Dept: GENERAL RADIOLOGY | Age: 51
Discharge: HOME | End: 2021-12-03
Payer: MEDICARE

## 2021-12-01 VITALS
BODY MASS INDEX: 24.91 KG/M2 | RESPIRATION RATE: 18 BRPM | TEMPERATURE: 97.6 F | DIASTOLIC BLOOD PRESSURE: 66 MMHG | HEART RATE: 76 BPM | HEIGHT: 66 IN | WEIGHT: 155 LBS | SYSTOLIC BLOOD PRESSURE: 110 MMHG

## 2021-12-01 DIAGNOSIS — L97.922 TRAUMATIC ULCER OF LEFT LOWER LEG WITH FAT LAYER EXPOSED (HCC): ICD-10-CM

## 2021-12-01 DIAGNOSIS — I73.9 PVD (PERIPHERAL VASCULAR DISEASE) (HCC): Primary | ICD-10-CM

## 2021-12-01 PROCEDURE — 6370000000 HC RX 637 (ALT 250 FOR IP): Performed by: PODIATRIST

## 2021-12-01 PROCEDURE — 73590 X-RAY EXAM OF LOWER LEG: CPT

## 2021-12-01 PROCEDURE — 99203 OFFICE O/P NEW LOW 30 MIN: CPT

## 2021-12-01 PROCEDURE — 11042 DBRDMT SUBQ TIS 1ST 20SQCM/<: CPT

## 2021-12-01 RX ORDER — LIDOCAINE HYDROCHLORIDE 40 MG/ML
SOLUTION TOPICAL ONCE
Status: CANCELLED | OUTPATIENT
Start: 2021-12-01 | End: 2021-12-01

## 2021-12-01 RX ORDER — LIDOCAINE HYDROCHLORIDE 40 MG/ML
SOLUTION TOPICAL ONCE
Status: COMPLETED | OUTPATIENT
Start: 2021-12-01 | End: 2021-12-01

## 2021-12-01 RX ORDER — BACITRACIN, NEOMYCIN, POLYMYXIN B 400; 3.5; 5 [USP'U]/G; MG/G; [USP'U]/G
OINTMENT TOPICAL ONCE
Status: CANCELLED | OUTPATIENT
Start: 2021-12-01 | End: 2021-12-01

## 2021-12-01 RX ORDER — BACITRACIN ZINC AND POLYMYXIN B SULFATE 500; 1000 [USP'U]/G; [USP'U]/G
OINTMENT TOPICAL ONCE
Status: CANCELLED | OUTPATIENT
Start: 2021-12-01 | End: 2021-12-01

## 2021-12-01 RX ORDER — LIDOCAINE 40 MG/G
CREAM TOPICAL ONCE
Status: CANCELLED | OUTPATIENT
Start: 2021-12-01 | End: 2021-12-01

## 2021-12-01 RX ORDER — LIDOCAINE HYDROCHLORIDE 20 MG/ML
JELLY TOPICAL ONCE
Status: CANCELLED | OUTPATIENT
Start: 2021-12-01 | End: 2021-12-01

## 2021-12-01 RX ORDER — LIDOCAINE 50 MG/G
OINTMENT TOPICAL ONCE
Status: CANCELLED | OUTPATIENT
Start: 2021-12-01 | End: 2021-12-01

## 2021-12-01 RX ORDER — CLOBETASOL PROPIONATE 0.5 MG/G
OINTMENT TOPICAL ONCE
Status: CANCELLED | OUTPATIENT
Start: 2021-12-01 | End: 2021-12-01

## 2021-12-01 RX ORDER — GENTAMICIN SULFATE 1 MG/G
OINTMENT TOPICAL ONCE
Status: CANCELLED | OUTPATIENT
Start: 2021-12-01 | End: 2021-12-01

## 2021-12-01 RX ORDER — GINSENG 100 MG
CAPSULE ORAL ONCE
Status: CANCELLED | OUTPATIENT
Start: 2021-12-01 | End: 2021-12-01

## 2021-12-01 RX ORDER — BETAMETHASONE DIPROPIONATE 0.05 %
OINTMENT (GRAM) TOPICAL ONCE
Status: CANCELLED | OUTPATIENT
Start: 2021-12-01 | End: 2021-12-01

## 2021-12-01 RX ORDER — ACETAMINOPHEN 325 MG/1
650 TABLET ORAL EVERY 6 HOURS PRN
COMMUNITY

## 2021-12-01 RX ADMIN — LIDOCAINE HYDROCHLORIDE: 40 SOLUTION TOPICAL at 09:23

## 2021-12-01 NOTE — PLAN OF CARE
Problem: Wound:  Goal: Will show signs of wound healing; wound closure and no evidence of infection  Description: Will show signs of wound healing; wound closure and no evidence of infection  Outcome: Ongoing     Problem: Compression therapy:  Goal: Will be free from complications associated with compression therapy  Description: Will be free from complications associated with compression therapy  Outcome: Ongoing

## 2021-12-01 NOTE — PROGRESS NOTES
[address]:     [employer] [id number] [address] p: [phone] [phone]     Ordering Center:     [address] [place] [address],  [address] [phone]  WOUND CARE Dept: Cristobal [phone]    Patient Information:      Romulo Vazquez  [address] [facility] [phone]   : 1970  AGE: 46 y.o. GENDER: female   EPISODE DATE: 2021    Insurance:      PRIMARY INSURANCE:  Plan: [facility] MEDICARE COMPLETE  Coverage: [facility] MEDICARE  Effective Date: 2021  Group Number: [unfilled]  Subscriber Number: [id number] - (Medicare Managed)    Payor/Plan Subscr  Sex Relation Sub. Ins. ID Effective Group Num   1.  [facility] 1970 Female Self [id number]                                   [address] [address]       Patient Wound Information:      Problem List Items Addressed This Visit     Traumatic ulcer of left lower leg with fat layer exposed ([address]    Relevant Orders    VL DHARMESH BILATERAL LIMITED 1-2 LEVELS    XR TIBIA FIBULA LEFT (2 VIEWS)      Other Visit Diagnoses     PVD (peripheral vascular disease) ([address]    -  Primary    Relevant Orders    VL DHARMESH BILATERAL LIMITED 1-2 LEVELS          WOUNDS REQUIRING DRESSING SUPPLIES:     Wound 21 Leg Left; Medial #1 (Active)   Wound Image   21   Dressing Status New dressing applied 2158   Wound Cleansed Cleansed with saline 2158   Dressing/Treatment Collagen with Ag; Silicone border  0958   Offloading for Diabetic Foot Ulcers No offloading required 2158   Wound Length (cm) 2.5 cm 21 0916   Wound Width (cm) 1.2 cm 21 0916   Wound Depth (cm) 0.2 cm 21 0916   Wound Surface Area (cm^2) 3 cm^2 21 0916   Wound Volume (cm^3) 0.6 cm^3 21 0916   Post-Procedure Length (cm) 2.5 cm 21   Post-Procedure Width (cm) 1.2 cm 21 Post-Procedure Depth (cm) 0.2 cm 12/01/21 0931   Post-Procedure Surface Area (cm^2) 3 cm^2 12/01/21 0931   Post-Procedure Volume (cm^3) 0.6 cm^3 12/01/21 0931   Wound Assessment Dry; Fibrin 12/01/21 0916   Drainage Amount Moderate 12/01/21 0931   Drainage Description Serosanguinous 12/01/21 0931   Odor None 12/01/21 0916   Hillary-wound Assessment Blanchable erythema; Dry/flaky 12/01/21 0916   Wound Thickness Description not for Pressure Injury Full thickness 12/01/21 0931   Number of days: 0          Supplies Requested :      WOUND #: 1   PRIMARY DRESSING:  Collagen with silver   Cover and Secure with: 4X4 gauze pad  Other excel sap 4 x 4cm gauze with border     FREQUENCY OF DRESSING CHANGES:  Every other day         ADDITIONAL ITEMS:  [] Gloves Small  [x] Gloves Medium [] Gloves Large [] Gloves XLarge  [] Tape 1\" [] Tape 2\" [] Tape 3\"  [] Medipore Tape  [x] Saline  [] Skin Prep   [] Adhesive Remover   [] Cotton Tip Applicators   [] Other:    Patient Wound(s) Debrided: [x] Yes if yes please add date 12-1-21   [] No    Debribement Type: Excisional/Sharp    Patient currently being seen by Home Health: [] Yes   [x] No    Duration for needed supplies:  []15  [x]30  []60  []90 Days    Electronically signed by Aristides Fan RN on 12/1/2021 at 2:32 PM     Provider Information:      PROVIDER'S NAME: Dr. Jaz Paulson    NPI: [id number]

## 2021-12-01 NOTE — PROGRESS NOTES
Wound Healing Center  History and Physical/Consultation  Podiatry    Referring Physician : Malachi Teresa MD  [facility] [address] RECORD NUMBER:  [id number]  AGE: 46 y.o. GENDER: female  : 1970  EPISODE DATE:  2021  Subjective:     Chief Complaint   Patient presents with    Wound Check     left leg         HISTORY of PRESENT ILLNESS HPI     Jacquelin Cooks is a 46 y.o. female who presents today for wound/ulcer evaluation. History of Wound Context:  The patient has had a wound of left leg trauma which was first noted approximately months. This has been treated antibiotic. On their initial visit to the wound healing center, 20 ,  the patient has noted that the wound has not been improving. The patient has had similar previous wounds in the past.      Pt is not on abx at time of initial visit.       Wound/Ulcer Pain Timing/Severity: constant  Quality of pain: aching  Severity:  5 / 10   Modifying Factors: Pain worsens with walking  Associated Signs/Symptoms: edema, erythema and drainage    Ulcer Identification:  Ulcer Type: venous and traumatic  Contributing Factors: edema and venous stasis    Diabetic/Pressure/Non Pressure Ulcers onl y:  Ulcer: Non-Pressure ulcer, fat layer exposed    If patient has diabetic lower extremity wounds  Russell Classification of diabetic lower extremity wounds:    Grade Description   []  0 No open wound   []  1 Superficial ulcer involving the full skin thickness   []  2 Deep ulcer involves ligament, tendon, joint capsule, or fascia  No bone involvement or abscess presence   []  3 Deep Ulcer with abcess formation and/or osteomyelitis   []  4 Localized gangrene   []  5 Extensive gangrene of the foot     Wound: Crush Injury        PAST MEDICAL HISTORY      Diagnosis Date    Hearing loss     congenital     Past Surgical History:   Procedure Laterality Date    FRACTURE SURGERY       Family History   Problem Relation Age of Onset    Hearing Loss Mother         deaf  Hearing Loss Father         deaf    Heart Disease Father         pacemaker     Social History     Tobacco Use    Smoking status: Former Smoker     Packs/day: 1.00     Years: 20.00     Pack years: 20.00     Types: Cigarettes     Quit date: 1/10/2007     Years since quittin.9    Smokeless tobacco: Never Used   Substance Use Topics    Alcohol use: Yes     Comment: 3 Seagrams per month    Drug use: Yes     Types: Marijuana (Weed)     Comment: daily     No Known Allergies  Current Outpatient Medications on File Prior to Encounter   Medication Sig Dispense Refill    acetaminophen (TYLENOL) 325 MG tablet Take 650 mg by mouth every 6 hours as needed for Pain      LYSINE PO Take by mouth      Biotin 1 MG CAPS Take by mouth       Multiple Vitamins-Minerals (THERAPEUTIC MULTIVITAMIN-MINERALS) tablet Take 1 tablet by mouth daily       sulfamethoxazole-trimethoprim (BACTRIM DS) 800-160 MG per tablet Take 1 tablet by mouth 2 times daily 20 tablet 0    B Complex-C-E-Zn (STRESS B-COMPLEX/VIT C/ZINC) TABS Take 1 tablet by mouth daily 30 tablet 2    gabapentin (NEURONTIN) 400 MG capsule       calcium carbonate (OSCAL) 500 MG TABS tablet Take 500 mg by mouth daily        No current facility-administered medications on file prior to encounter.        REVIEW OF SYSTEMS   ROS : All others Negative if blank [], Positive if [x]  General Vascular   [] Fevers [] Claudication   [] Chills [] Rest Pain   Skin Neurologic   [x] Tissue Loss [x] Lower extremity neuropathy     Objective:    /66   Pulse 76   Temp 97.6 °F (36.4 °C) (Temporal)   Resp 18   Ht 5' 6\" (1.676 m)   Wt 155 lb (70.3 kg)   BMI 25.02 kg/m²   Wt Readings from Last 3 Encounters:   21 155 lb (70.3 kg)   10/29/21 155 lb (70.3 kg)   10/26/21 154 lb (69.9 kg)       PHYSICAL EXAM   CONSTITUTIONAL:   Awake, alert, cooperative   PSYCHIATRIC :  Oriented to time, place and person      normal insight to disease process  EXTREMITIES:   R LE Open wounds are not noted   Skin color is normal   Edema is  noted   Sensation intact to light touch   Palpation of the foot does cause pain   5/5 strength DF/PF  L LE Open wounds are noted   Skin color is abnormal with ulcer   Edema is  noted   Sensation intact to light touch   Palpation of the foot does cause pain   5/5 strength DF/PF  R dorsalis pedis 1 L dorsalis pedis 1   R posterior tibial 1 L posterior tibial 1     Assessment:     Problem List Items Addressed This Visit     Traumatic ulcer of left lower leg with fat layer exposed ([address]          Pre Debridement Measurements:  Are located in the [place]  Documentation Flow Sheet  Post Debridement Measurements:  Wound/Ulcer Descriptions are Pre Debridement except measurements:     Wound 12/01/21 Leg Left; Medial #1 (Active)   Wound Image   12/01/21 0916   Wound Length (cm) 2.5 cm 12/01/21 0916   Wound Width (cm) 1.2 cm 12/01/21 0916   Wound Depth (cm) 0.2 cm 12/01/21 0916   Wound Surface Area (cm^2) 3 cm^2 12/01/21 0916   Wound Volume (cm^3) 0.6 cm^3 12/01/21 0916   Post-Procedure Length (cm) 2.5 cm 12/01/21 0931   Post-Procedure Width (cm) 1.2 cm 12/01/21 0931   Post-Procedure Depth (cm) 0.2 cm 12/01/21 0931   Post-Procedure Surface Area (cm^2) 3 cm^2 12/01/21 0931   Post-Procedure Volume (cm^3) 0.6 cm^3 12/01/21 0931   Wound Assessment Dry; Fibrin 12/01/21 0916   Drainage Amount None 12/01/21 0916   Odor None 12/01/21 0916   Hillary-wound Assessment Blanchable erythema; Dry/flaky 12/01/21 0916   Number of days: 0          Procedure Note  Indications:  Based on my examination of this patient's wound(s)/ulcer(s) today, debridement is required to promote healing and evaluate the wound base.     Performed by: Nila Cavazos DPM    Consent obtained:  Yes    Time out taken:  Yes    Pain Control: Anesthetic  Anesthetic: 4% Lidocaine Liquid Topical     Debridement:Excisional Debridement    Using #15 blade scalpel the wound(s)/ulcer(s) was/were sharply debrided down through and including the removal of subcutaneous tissue. Devitalized Tissue Debrided:  fibrin, biofilm, slough and necrotic/eschar to stimulate bleeding to promote healing, post debridement good bleeding base and wound edges noted    Wound/Ulcer #: 1    Percent of Wound/Ulcer Debrided: 100%    Total Surface Area Debrided:  3 sq cm     Estimated Blood Loss:  Minimal  Hemostasis Achieved:  by pressure    Procedural Pain:  3  / 10   Post Procedural Pain:  4 / 10     Response to treatment:  Well tolerated by patient. A culture was done. Plan:     Pt is not a smoker   - Discussed relationship of smoking and negative affects on wound healing   - Emphasized importance of tobacco avoidace/cessation   - Script for nicotine patch given to patient   - Information regarding support groups for smoking cessation given    In my professional opinion and based off the information that is available at this time this patient has appropriate indication for HBO Therapy: Maybe    Treatment Note please see attached Discharge Instructions    Written patient dismissal instructions given to patient and signed by patient or POA. Discharge Instructions       Visit Discharge/Physician Orders    Discharge condition: Stable    Assessment of pain at discharge:  NONE    Anesthetic used: 4% lidocaine solution    Discharge to: Home    Left via:Private automobile    Accompanied by: accompanied by self    ECF/HHA:   [facility]    Dressing Orders:  [address] PAACK LIGHTLY WITH SALINE MOIST MARAL. COVER WITH BORDERED GAUZE TO SECURE CHANGE EVERY OTHER DAY  Treatment Orders: Tissue C&S taken of left leg ulcer taken   wound care will schedule vasculars. PLEASE GET XRAY OF LEFT LEG    [address] [facility] followup visit ___________One week with Dr. Winters__________________  (Please note your next appointment above and if you are unable to keep, kindly give a 24 hour notice.  Thank you.)    Physician signature:__________________________      If you experience any of the following, please call the [address] during business hours:    * Increase in Pain  * Temperature over 101  * Increase in drainage from your wound  * Drainage with a foul odor  * Bleeding  * Increase in swelling  * Need for compression bandage changes due to slippage, breakthrough drainage. If you need medical attention outside of the business hours of the [address] please contact your PCP or go to the nearest emergency room.         Electronically signed by Nila Cavazos DPM on 12/1/2021 at 9:53 AM

## 2021-12-01 NOTE — DISCHARGE INSTR - COC
Continuity of Care Form    Patient Name: Ashley Olguin   :  1970  MRN:  [id number]    Admit date:  2021  Discharge date:  ***    Code Status Order: No Order   Advance Directives:      Admitting Physician:  No admitting provider for patient encounter. PCP: Araina Hart MD    Discharging Nurse: [facility] Unit/Room#: No information available for this encounter. Discharging Unit Phone Number: ***    Emergency Contact:   Extended Emergency Contact Information  Primary Emergency Contact: Felipe Patrick   [address] Phone: [phone]  Mobile Phone: [phone]  Relation: Spouse  Hard of hearing? Yes   needed?  No    Past Surgical History:  Past Surgical History:   Procedure Laterality Date    FRACTURE SURGERY         Immunization History:   Immunization History   Administered Date(s) Administered    COVID-19, J&J, PF, 0.5 mL 2021    Tdap (Boostrix, Adacel) 2019       Active Problems:  Patient Active Problem List   Diagnosis Code    Closed left ankle fracture, with routine healing, subsequent encounter S82.892D    Nondisplaced fracture of left tibial spine, subsequent encounter for closed fracture with routine healing S82.115D       Isolation/Infection:   Isolation            No Isolation          Patient Infection Status       None to display            Nurse Assessment:  Last Vital Signs: /66   Pulse 76   Temp 97.6 °F (36.4 °C) (Temporal)   Resp 18   Ht 5' 6\" (1.676 m)   Wt 155 lb (70.3 kg)   BMI 25.02 kg/m²     Last documented pain score (0-10 scale):    Last Weight:   Wt Readings from Last 1 Encounters:   21 155 lb (70.3 kg)     Mental Status:  {IP PT MENTAL STATUS:}    IV Access:  {[facility] IV ACCESS:777070018}    Nursing Mobility/ADLs:  Walking   {P DME KTZZ:711866490}  Transfer  {[facility] DME VLBC:620926242}  Bathing  {[facility] DME FYJT:418833406}  Dressing  {P DME VLZD:367561697}  Toileting  {P DME ABAER:005050359}  Feeding  {CH[facility] DME VWSN:282259414}  Med Admin  {CH[facility] DME CARLOZ:976714988}  Med Delivery   { [facility] Delivery:851458018}    Wound Care Documentation and Therapy:  Wound 12/01/21 Leg Left; Medial #1 (Active)   Wound Image   12/01/21 0916   Wound Length (cm) 2.5 cm 12/01/21 0916   Wound Width (cm) 1.2 cm 12/01/21 0916   Wound Depth (cm) 0.2 cm 12/01/21 0916   Wound Surface Area (cm^2) 3 cm^2 12/01/21 0916   Wound Volume (cm^3) 0.6 cm^3 12/01/21 0916   Post-Procedure Length (cm) 2.5 cm 12/01/21 0931   Post-Procedure Width (cm) 1.2 cm 12/01/21 0931   Post-Procedure Depth (cm) 0.2 cm 12/01/21 0931   Post-Procedure Surface Area (cm^2) 3 cm^2 12/01/21 0931   Post-Procedure Volume (cm^3) 0.6 cm^3 12/01/21 0931   Wound Assessment Dry; Fibrin 12/01/21 0916   Drainage Amount None 12/01/21 0916   Odor None 12/01/21 0916   Hillary-wound Assessment Blanchable erythema; Dry/flaky 12/01/21 0916   Number of days: 0        Elimination:  Continence: Bowel: {YES / LM:94174}  Bladder: {YES / FJ:60630}  Urinary Catheter: {Urinary Catheter:304394990}   Colostomy/Ileostomy/Ileal Conduit: {YES / PF:34291}       Date of Last BM: ***  No intake or output data in the 24 hours ending 12/01/21 0951  No intake/output data recorded.     Safety Concerns:     [address] Safety Concerns:518572663}    Impairments/Disabilities:      [address] Impairments/Disabilities:677938913}    Nutrition Therapy:  Current Nutrition Therapy:   [address] Diet List:397507638}    Routes of Feeding: {CHP DME Other Feedings:800601842}  Liquids: {Slp liquid thickness:63562}  Daily Fluid Restriction: {CHP DME Yes amt example:645392412}  Last Modified Barium Swallow with Video (Video Swallowing Test): {Done Not Done YQDS:776159640}    Treatments at the Time of Hospital Discharge:   Respiratory Treatments: ***  Oxygen Therapy:  {Therapy; copd oxygen:79051}  Ventilator:    {MH CC Vent UPDK:471073598}    Rehab Therapies: {THERAPEUTIC INTERVENTION:8591763672}  Weight Bearing Status/Restrictions: 50Scar Levy CC Weight Bearin}  Other Medical Equipment (for information only, NOT a DME order):  {EQUIPMENT:386746785}  Other Treatments: ***    Patient's personal belongings (please select all that are sent with patient):  {CHP DME Belongings:412404014}    RN SIGNATURE:  {Esignature:129714349}    CASE MANAGEMENT/SOCIAL WORK SECTION    Inpatient Status Date: ***    Readmission Risk Assessment Score:  Readmission Risk              Risk of Unplanned Readmission:  0           Discharging to Facility/ Agency   Name:   Address:  Phone:  Fax:    Dialysis Facility (if applicable)   Name:  Address:  Dialysis Schedule:  Phone:  Fax:    / signature: {Esignature:478424544}    PHYSICIAN SECTION    Prognosis: {Prognosis:6680263795}    Condition at Discharge: Samantha Levy Patient Condition:971650484}    Rehab Potential (if transferring to Rehab): {Prognosis:5603530408}    Recommended Labs or Other Treatments After Discharge: ***    Physician Certification: I certify the above information and transfer of Ashley Olguin  is necessary for the continuing treatment of the diagnosis listed and that she requires {Admit to Appropriate Level of Care:42344} for {GREATER/LESS:730994122} 30 days.      Update Admission H&P: {CHP DME Changes in OEXIT:569783655}    PHYSICIAN SIGNATURE:  {Esignature:542198272}

## 2021-12-08 ENCOUNTER — HOSPITAL ENCOUNTER (OUTPATIENT)
Dept: WOUND CARE | Age: 51
Discharge: HOME | End: 2021-12-08
Payer: MEDICARE

## 2021-12-08 VITALS
RESPIRATION RATE: 16 BRPM | HEART RATE: 74 BPM | DIASTOLIC BLOOD PRESSURE: 70 MMHG | SYSTOLIC BLOOD PRESSURE: 128 MMHG | TEMPERATURE: 96.3 F

## 2021-12-08 DIAGNOSIS — L97.922 TRAUMATIC ULCER OF LEFT LOWER LEG WITH FAT LAYER EXPOSED (HCC): Primary | ICD-10-CM

## 2021-12-08 PROCEDURE — 15271 SKIN SUB GRAFT TRNK/ARM/LEG: CPT

## 2021-12-08 PROCEDURE — 6370000000 HC RX 637 (ALT 250 FOR IP): Performed by: PODIATRIST

## 2021-12-08 RX ORDER — LIDOCAINE HYDROCHLORIDE 40 MG/ML
SOLUTION TOPICAL ONCE
Status: CANCELLED | OUTPATIENT
Start: 2021-12-08 | End: 2021-12-08

## 2021-12-08 RX ORDER — LIDOCAINE HYDROCHLORIDE 20 MG/ML
JELLY TOPICAL ONCE
Status: CANCELLED | OUTPATIENT
Start: 2021-12-08 | End: 2021-12-08

## 2021-12-08 RX ORDER — CLOBETASOL PROPIONATE 0.5 MG/G
OINTMENT TOPICAL ONCE
Status: CANCELLED | OUTPATIENT
Start: 2021-12-08 | End: 2021-12-08

## 2021-12-08 RX ORDER — LIDOCAINE 40 MG/G
CREAM TOPICAL ONCE
Status: CANCELLED | OUTPATIENT
Start: 2021-12-08 | End: 2021-12-08

## 2021-12-08 RX ORDER — BACITRACIN, NEOMYCIN, POLYMYXIN B 400; 3.5; 5 [USP'U]/G; MG/G; [USP'U]/G
OINTMENT TOPICAL ONCE
Status: CANCELLED | OUTPATIENT
Start: 2021-12-08 | End: 2021-12-08

## 2021-12-08 RX ORDER — BETAMETHASONE DIPROPIONATE 0.05 %
OINTMENT (GRAM) TOPICAL ONCE
Status: CANCELLED | OUTPATIENT
Start: 2021-12-08 | End: 2021-12-08

## 2021-12-08 RX ORDER — GINSENG 100 MG
CAPSULE ORAL ONCE
Status: CANCELLED | OUTPATIENT
Start: 2021-12-08 | End: 2021-12-08

## 2021-12-08 RX ORDER — BACITRACIN ZINC AND POLYMYXIN B SULFATE 500; 1000 [USP'U]/G; [USP'U]/G
OINTMENT TOPICAL ONCE
Status: CANCELLED | OUTPATIENT
Start: 2021-12-08 | End: 2021-12-08

## 2021-12-08 RX ORDER — LIDOCAINE 50 MG/G
OINTMENT TOPICAL ONCE
Status: CANCELLED | OUTPATIENT
Start: 2021-12-08 | End: 2021-12-08

## 2021-12-08 RX ORDER — LIDOCAINE HYDROCHLORIDE 40 MG/ML
SOLUTION TOPICAL ONCE
Status: COMPLETED | OUTPATIENT
Start: 2021-12-08 | End: 2021-12-08

## 2021-12-08 RX ORDER — LEVOFLOXACIN 500 MG/1
500 TABLET, FILM COATED ORAL DAILY
Qty: 7 TABLET | Refills: 0 | Status: SHIPPED | OUTPATIENT
Start: 2021-12-08 | End: 2021-12-15

## 2021-12-08 RX ORDER — GENTAMICIN SULFATE 1 MG/G
OINTMENT TOPICAL ONCE
Status: CANCELLED | OUTPATIENT
Start: 2021-12-08 | End: 2021-12-08

## 2021-12-08 RX ADMIN — LIDOCAINE HYDROCHLORIDE 4 ML: 40 SOLUTION TOPICAL at 09:48

## 2021-12-08 NOTE — PROGRESS NOTES
PuraPly AMTreatment Note    NAME:  Juan Pablo Rosenberg  YOB: 1970  MEDICAL RECORD NUMBER:  [id number]  DATE:  12/8/2021    Goal:  Patient will receive safe and proper application of skin substitute. Patient will comply with caring for dressing, and reporting complications. Expiration date checked immediately prior to use. Package intact prior to use and no damage noted. Transport temperature controlled and acceptable. PuraPly AM was removed from protective sterile packaging by provider and applied to prepared ulcer bed. PuraPly AM was hydrated with sterile normal saline per provider. PuraPly AM was applied to left leg ulcer and affixed with steri-strips by the provider. PuraPly AM was covered with non-adherent ulcer dressing. Applied steri strips applied to secure graft over non-adherent. Applied dry gauze and/or roll gauze. Patient/caregiver was instructed not to remove dressing and to keep it clean and dry. Pt/family/caregiver was instructed on signs and symptoms of complications to report such as draining through dressing, dressing falling down/slipping, getting wet, or severe pain or tingling. PuraPly AM may be applied a total of 10 times per wound over a 12 week period. Date of first application of PuraPly for this current wound is December 8, 2021.     Guidelines followed    Electronically signed by Edy Fox RN on 12/8/2021 at 3:11 PM

## 2021-12-08 NOTE — PROGRESS NOTES
[facility]  History and Physical/Consultation  Podiatry    Referring Physician : Karrie Neal MD  [facility] [address] RECORD NUMBER:  [id number]  AGE: 46 y.o. GENDER: female  : 1970  EPISODE DATE:  2021  Subjective:     Chief Complaint   Patient presents with    Wound Check     wound left leg         HISTORY of PRESENT ILLNESS HPI     Josue Uriarte is a 46 y.o. female who presents today for wound/ulcer evaluation. History of Wound Context:  The patient has had a wound of left leg trauma which was first noted approximately months. This has been treated antibiotic. On their initial visit to the wound healing center, 20 ,  the patient has noted that the wound has not been improving. The patient has had similar previous wounds in the past.      Pt is not on abx at time of initial visit.       Wound/Ulcer Pain Timing/Severity: constant  Quality of pain: aching  Severity:  5 / 10   Modifying Factors: Pain worsens with walking  Associated Signs/Symptoms: edema, erythema and drainage    Ulcer Identification:  Ulcer Type: venous and traumatic  Contributing Factors: edema and venous stasis    Diabetic/Pressure/Non Pressure Ulcers onl y:  Ulcer: Non-Pressure ulcer, fat layer exposed    If patient has diabetic lower extremity wounds  Russell Classification of diabetic lower extremity wounds:    Grade Description   []  0 No open wound   []  1 Superficial ulcer involving the full skin thickness   []  2 Deep ulcer involves ligament, tendon, joint capsule, or fascia  No bone involvement or abscess presence   []  3 Deep Ulcer with abcess formation and/or osteomyelitis   []  4 Localized gangrene   []  5 Extensive gangrene of the foot     Wound: Crush Injury        PAST MEDICAL HISTORY      Diagnosis Date    Hearing loss     congenital     Past Surgical History:   Procedure Laterality Date    FRACTURE SURGERY       Family History   Problem Relation Age of Onset    Hearing Loss Mother deaf    Hearing Loss Father         deaf    Heart Disease Father         pacemaker     Social History     Tobacco Use    Smoking status: Former Smoker     Packs/day: 1.00     Years: 20.00     Pack years: 20.00     Types: Cigarettes     Quit date: 1/10/2007     Years since quittin.9    Smokeless tobacco: Never Used   Substance Use Topics    Alcohol use: Yes     Comment: 3 Seagrams per month    Drug use: Yes     Types: Marijuana (Weed)     Comment: daily     No Known Allergies  Current Outpatient Medications on File Prior to Encounter   Medication Sig Dispense Refill    LYSINE PO Take by mouth      B Complex-C-E-Zn (STRESS B-COMPLEX/VIT C/ZINC) TABS Take 1 tablet by mouth daily 30 tablet 2    calcium carbonate (OSCAL) 500 MG TABS tablet Take 500 mg by mouth daily       Biotin 1 MG CAPS Take by mouth       Multiple Vitamins-Minerals (THERAPEUTIC MULTIVITAMIN-MINERALS) tablet Take 1 tablet by mouth daily       acetaminophen (TYLENOL) 325 MG tablet Take 650 mg by mouth every 6 hours as needed for Pain      gabapentin (NEURONTIN) 400 MG capsule        No current facility-administered medications on file prior to encounter.        REVIEW OF SYSTEMS   ROS : All others Negative if blank [], Positive if [x]  General Vascular   [] Fevers [] Claudication   [] Chills [] Rest Pain   Skin Neurologic   [x] Tissue Loss [x] Lower extremity neuropathy       21  Debrided, cont tx and care, levaquin 500mg daily x 7 days  Objective:    /70   Pulse 74   Temp 96.3 °F (35.7 °C) (Temporal)   Resp 16   Wt Readings from Last 3 Encounters:   21 155 lb (70.3 kg)   10/29/21 155 lb (70.3 kg)   10/26/21 154 lb (69.9 kg)       PHYSICAL EXAM   CONSTITUTIONAL:   Awake, alert, cooperative   PSYCHIATRIC :  Oriented to time, place and person      normal insight to disease process  EXTREMITIES:   R LE Open wounds are not noted   Skin color is normal   Edema is  noted   Sensation intact to light touch   Palpation of the foot does cause pain   5/5 strength DF/PF  L LE Open wounds are noted   Skin color is abnormal with ulcer   Edema is  noted   Sensation intact to light touch   Palpation of the foot does cause pain   5/5 strength DF/PF  R dorsalis pedis 1 L dorsalis pedis 1   R posterior tibial 1 L posterior tibial 1     Assessment:     Problem List Items Addressed This Visit     Traumatic ulcer of left lower leg with fat layer exposed ([address] - Primary    Relevant Orders    Initiate Outpatient Wound Care Protocol          Pre Debridement Measurements:  Are located in the [facility]  Documentation Flow Sheet  Post Debridement Measurements:  Wound/Ulcer Descriptions are Pre Debridement except measurements:     Wound 12/01/21 Leg Left; Medial #1  trauma (Active)   Wound Image   12/01/21 0916   Wound Etiology Traumatic 12/01/21 0916   Dressing Status New dressing applied 12/01/21 0958   Wound Cleansed Cleansed with saline 12/01/21 0958   Dressing/Treatment Collagen with Ag; Silicone border 05/76/45 0958   Offloading for Diabetic Foot Ulcers No offloading required 12/01/21 0958   Wound Length (cm) 2 cm 12/08/21 0949   Wound Width (cm) 1 cm 12/08/21 0949   Wound Depth (cm) 0.4 cm 12/08/21 0949   Wound Surface Area (cm^2) 2 cm^2 12/08/21 0949   Change in Wound Size % (l*w) 33.33 12/08/21 0949   Wound Volume (cm^3) 0.8 cm^3 12/08/21 0949   Wound Healing % -33 12/08/21 0949   Post-Procedure Length (cm) 2 cm 12/08/21 1016   Post-Procedure Width (cm) 1 cm 12/08/21 1016   Post-Procedure Depth (cm) 0.4 cm 12/08/21 1016   Post-Procedure Surface Area (cm^2) 2 cm^2 12/08/21 1016   Post-Procedure Volume (cm^3) 0.8 cm^3 12/08/21 1016   Wound Assessment Fibrin; [facility] 12/08/21 0949   Drainage Amount Moderate 12/08/21 0949   Drainage Description Yellow 12/08/21 0949   Odor None 12/08/21 0949   Hillary-wound Assessment Edematous; Fragile 12/08/21 0949   Wound Thickness Description not for Pressure Injury Full thickness 12/01/21 0931   Number of days: 7 Procedure Note  Indications:  Based on my examination of this patient's wound(s)/ulcer(s) today, debridement is required to promote healing and evaluate the wound base. Performed by: Linda Gonzalez DPM    Consent obtained:  Yes    Time out taken:  Yes    Pain Control: Anesthetic  Anesthetic: 4% Lidocaine Liquid Topical     Debridement:Excisional Debridement    Using #15 blade scalpel the wound(s)/ulcer(s) was/were sharply debrided down through and including the removal of subcutaneous tissue. Devitalized Tissue Debrided:  fibrin, biofilm, slough and necrotic/eschar to stimulate bleeding to promote healing, post debridement good bleeding base and wound edges noted    Wound/Ulcer #: 1    Percent of Wound/Ulcer Debrided: 100%    Total Surface Area Debrided:  3 sq cm     Estimated Blood Loss:  Minimal  Hemostasis Achieved:  by pressure    Procedural Pain:  3  / 10   Post Procedural Pain:  4 / 10     Response to treatment:  Well tolerated by patient. A culture was done. Plan:     Pt is not a smoker   - Discussed relationship of smoking and negative affects on wound healing   - Emphasized importance of tobacco avoidace/cessation   - Script for nicotine patch given to patient   - Information regarding support groups for smoking cessation given    In my professional opinion and based off the information that is available at this time this patient has appropriate indication for HBO Therapy: Maybe    Treatment Note please see attached Discharge Instructions    Written patient dismissal instructions given to patient and signed by patient or POA.          Discharge Instructions       Visit Discharge/Physician Orders    Discharge condition: Stable    Assessment of pain at discharge:  NONE    Anesthetic used: 4% lidocaine solution    Discharge to: Home    Left via:Private automobile    Accompanied by: accompanied by self    ECF/HHA:   [facility]    Dressing Orders:  Clayton Landin ULCER WITH NORMAL SALINE. #1 puraply applied to left leg ulcer and steri stripped in place. Nicki Barajas Apply Kerlex and coban for one week    Treatment Orders: Tissue C&S taken of left leg ulcer taken reviewed  START LEVAQUIN 500 MG DAILY UNTIL DONE  TAKE BENADRYL FOR ITCHING     wound care  scheduled ARTERIAL STUDY FOR February 9TH AT 2:15 PM ARRIVAL. XRAY OF LEFT LEG reviewed    [address] [place] [address],[address] followup visit ___________One week with Dr. Winters__________________  (Please note your next appointment above and if you are unable to keep, kindly give a 24 hour notice. Thank you.)    Physician signature:__________________________      If you experience any of the following, please call the [facility] during business hours:    * Increase in Pain  * Temperature over 101  * Increase in drainage from your wound  * Drainage with a foul odor  * Bleeding  * Increase in swelling  * Need for compression bandage changes due to slippage, breakthrough drainage. If you need medical attention outside of the business hours of the [facility] please contact your PCP or go to the nearest emergency room.         Electronically signed by Chantale Espinoza DPM on 12/8/2021 at 10:31 AM

## 2021-12-17 ENCOUNTER — HOSPITAL ENCOUNTER (OUTPATIENT)
Dept: WOUND CARE | Age: 51
Discharge: HOME | End: 2021-12-17
Payer: MEDICARE

## 2021-12-17 VITALS
WEIGHT: 155 LBS | BODY MASS INDEX: 24.91 KG/M2 | SYSTOLIC BLOOD PRESSURE: 122 MMHG | RESPIRATION RATE: 17 BRPM | HEIGHT: 66 IN | TEMPERATURE: 96.6 F | HEART RATE: 72 BPM | DIASTOLIC BLOOD PRESSURE: 78 MMHG

## 2021-12-17 DIAGNOSIS — L97.922 TRAUMATIC ULCER OF LEFT LOWER LEG WITH FAT LAYER EXPOSED (HCC): Primary | ICD-10-CM

## 2021-12-17 PROCEDURE — 15271 SKIN SUB GRAFT TRNK/ARM/LEG: CPT

## 2021-12-17 PROCEDURE — 6370000000 HC RX 637 (ALT 250 FOR IP): Performed by: PODIATRIST

## 2021-12-17 PROCEDURE — C5271 LOW COST SKIN SUBSTITUTE APP: HCPCS

## 2021-12-17 RX ORDER — BETAMETHASONE DIPROPIONATE 0.05 %
OINTMENT (GRAM) TOPICAL ONCE
Status: CANCELLED | OUTPATIENT
Start: 2021-12-17 | End: 2021-12-17

## 2021-12-17 RX ORDER — LIDOCAINE HYDROCHLORIDE 40 MG/ML
SOLUTION TOPICAL ONCE
Status: CANCELLED | OUTPATIENT
Start: 2021-12-17 | End: 2021-12-17

## 2021-12-17 RX ORDER — LIDOCAINE HYDROCHLORIDE 40 MG/ML
SOLUTION TOPICAL ONCE
Status: COMPLETED | OUTPATIENT
Start: 2021-12-17 | End: 2021-12-17

## 2021-12-17 RX ORDER — LIDOCAINE 50 MG/G
OINTMENT TOPICAL ONCE
Status: CANCELLED | OUTPATIENT
Start: 2021-12-17 | End: 2021-12-17

## 2021-12-17 RX ORDER — BACITRACIN ZINC AND POLYMYXIN B SULFATE 500; 1000 [USP'U]/G; [USP'U]/G
OINTMENT TOPICAL ONCE
Status: CANCELLED | OUTPATIENT
Start: 2021-12-17 | End: 2021-12-17

## 2021-12-17 RX ORDER — LIDOCAINE HYDROCHLORIDE 20 MG/ML
JELLY TOPICAL ONCE
Status: CANCELLED | OUTPATIENT
Start: 2021-12-17 | End: 2021-12-17

## 2021-12-17 RX ORDER — BACITRACIN, NEOMYCIN, POLYMYXIN B 400; 3.5; 5 [USP'U]/G; MG/G; [USP'U]/G
OINTMENT TOPICAL ONCE
Status: CANCELLED | OUTPATIENT
Start: 2021-12-17 | End: 2021-12-17

## 2021-12-17 RX ORDER — CLOBETASOL PROPIONATE 0.5 MG/G
OINTMENT TOPICAL ONCE
Status: CANCELLED | OUTPATIENT
Start: 2021-12-17 | End: 2021-12-17

## 2021-12-17 RX ORDER — GENTAMICIN SULFATE 1 MG/G
OINTMENT TOPICAL ONCE
Status: CANCELLED | OUTPATIENT
Start: 2021-12-17 | End: 2021-12-17

## 2021-12-17 RX ORDER — GINSENG 100 MG
CAPSULE ORAL ONCE
Status: CANCELLED | OUTPATIENT
Start: 2021-12-17 | End: 2021-12-17

## 2021-12-17 RX ORDER — LIDOCAINE 40 MG/G
CREAM TOPICAL ONCE
Status: CANCELLED | OUTPATIENT
Start: 2021-12-17 | End: 2021-12-17

## 2021-12-17 RX ADMIN — LIDOCAINE HYDROCHLORIDE: 40 SOLUTION TOPICAL at 08:13

## 2021-12-17 NOTE — PROGRESS NOTES
PuraPly AMTreatment Note    NAME:  Ginny Liz  YOB: 1970  MEDICAL RECORD NUMBER:  [id number]  DATE:  12/17/2021    Goal:  Patient will receive safe and proper application of skin substitute. Patient will comply with caring for dressing, and reporting complications. Expiration date checked immediately prior to use. Package intact prior to use and no damage noted. Transport temperature controlled and acceptable. PuraPly AM was removed from protective sterile packaging by provider and applied to prepared ulcer bed. PuraPly AM was hydrated with sterile normal saline per provider. PuraPly AM was applied to left leg and affixed with steri-strips by the provider. PuraPly AM was covered with non-adherent ulcer dressing. Applied steri strip applied to secure graft over non-adherent. Applied dry gauze and/or roll gauze. Patient/caregiver was instructed not to remove dressing and to keep it clean and dry. Pt/family/caregiver was instructed on signs and symptoms of complications to report such as draining through dressing, dressing falling down/slipping, getting wet, or severe pain or tingling. PuraPly AM may be applied a total of 10 times per wound over a 12 week period. Date of first application of PuraPly for this current wound is December 8, 2021.     Guidelines followed    Electronically signed by Rehan Lemus RN on 12/17/2021 at 8:35 AM

## 2021-12-17 NOTE — DISCHARGE INSTR - COC
Continuity of Care Form    Patient Name: Angela Zhou   :  1970  MRN:  [id number]    Admit date:  (Not on file)  Discharge date:  ***    Code Status Order: No Order   Advance Directives:      Admitting Physician:  No admitting provider for patient encounter. PCP: Ananya Hays MD    Discharging Nurse: [facility] Unit/Room#: No information available for this encounter. Discharging Unit Phone Number: ***    Emergency Contact:   Extended Emergency Contact Information  Primary Emergency Contact: PatrickFelipe   [address] Phone: [phone]  Mobile Phone: [phone]  Relation: Spouse  Hard of hearing? Yes   needed? No    Past Surgical History:  Past Surgical History:   Procedure Laterality Date    FRACTURE SURGERY         Immunization History:   Immunization History   Administered Date(s) Administered    COVID-19, J&J, PF, 0.5 mL 2021    Tdap (Boostrix, Adacel) 2019       Active Problems:  Patient Active Problem List   Diagnosis Code    Closed left ankle fracture, with routine healing, subsequent encounter S82.892D    Nondisplaced fracture of left tibial spine, subsequent encounter for closed fracture with routine healing S82.115D    Traumatic ulcer of left lower leg with [facility] layer [facility] H27.209       Isolation/Infection:   Isolation            No Isolation          Patient Infection Status       None to display            Nurse Assessment:  Last Vital Signs: There were no vitals taken for this visit.     Last documented pain score (0-10 scale):    Last Weight:   Wt Readings from Last 1 Encounters:   21 155 lb (70.3 kg)     Mental Status:  {IP PT MENTAL STATUS:57117}    IV Access:  [address] IV ACCESS:513775397}    Nursing Mobility/ADLs:  Walking   {CHP DME YAAU:964062634}  Transfer  {CHP DME MBWJ:220831933}  Bathing  {CHP DME ZKBC:109192074}  Dressing  {CHP DME ZVXI:399893910}  Toileting  {CHP DME YBZQ:024528610}  Feeding  {CHP DME UDVQ:052209782}  Med Admin  {CHP DME MKOM:703893359}  Med Delivery   { [facility] MED Delivery:930223950}    Wound Care Documentation and Therapy:  Wound 12/01/21 Leg Left; Medial #1  trauma (Active)   Wound Image   12/01/21 0916   Wound Etiology Traumatic 12/01/21 0916   Dressing Status New dressing applied 12/17/21 0838   Wound Cleansed Cleansed with saline 12/17/21 0838   Dressing/Treatment Other (comment) 12/17/21 0838   Offloading for Diabetic Foot Ulcers No offloading required 12/01/21 0958   Wound Length (cm) 1.7 cm 12/17/21 0809   Wound Width (cm) 1 cm 12/17/21 0809   Wound Depth (cm) 0.3 cm 12/17/21 0809   Wound Surface Area (cm^2) 1.7 cm^2 12/17/21 0809   Change in Wound Size % (l*w) 43.33 12/17/21 0809   Wound Volume (cm^3) 0.51 cm^3 12/17/21 0809   Wound Healing % 15 12/17/21 0809   Post-Procedure Length (cm) 1.7 cm 12/17/21 0817   Post-Procedure Width (cm) 1 cm 12/17/21 0817   Post-Procedure Depth (cm) 0.3 cm 12/17/21 0817   Post-Procedure Surface Area (cm^2) 1.7 cm^2 12/17/21 0817   Post-Procedure Volume (cm^3) 0.51 cm^3 12/17/21 0817   Undermining Starts ___ O'Clock 10 12/17/21 0809   Undermining Ends___ O'Clock 12 12/17/21 0809   Undermining Maxium Distance (cm) 0.7 @ 11 12/17/21 0809   Wound Assessment Fibrin; Pink/red 12/17/21 0809   Drainage Amount Moderate 12/17/21 0809   Drainage Description Serosanguinous 12/17/21 0809   Odor None 12/17/21 0809   Hillary-wound Assessment Edematous; Fragile 12/08/21 0949   Wound Thickness Description not for Pressure Injury Full thickness 12/01/21 0931   Number of days: 16        Elimination:  Continence: Bowel: {YES / TM:18699}  Bladder: {YES / TC:18871}  Urinary Catheter: {Urinary Catheter:986227199}   Colostomy/Ileostomy/Ileal Conduit: {YES / ZD:38784}       Date of Last BM: ***  No intake or output data in the 24 hours ending 12/17/21 1712  No intake/output data recorded.     Safety Concerns:     Samantha CERON Safety Concerns:677311987}    Impairments/Disabilities: [address] [facility] Impairments/Disabilities:612765954}    Nutrition Therapy:  Current Nutrition Therapy:   [address] [facility] Diet List:239941368}    Routes of Feeding: {CHP DME Other Feedings:408841732}  Liquids: {Slp liquid thickness:96848}  Daily Fluid Restriction: {CHP DME Yes amt example:578154976}  Last Modified Barium Swallow with Video (Video Swallowing Test): {Done Not Done HHEK:591042670}    Treatments at the Time of Hospital Discharge:   Respiratory Treatments: ***  Oxygen Therapy:  {Therapy; copd oxygen:95562}  Ventilator:    { CC Vent SHWX:100074821}    Rehab Therapies: {THERAPEUTIC INTERVENTION:1153841099}  Weight Bearing Status/Restrictions: [address]  Weight Bearin}  Other Medical Equipment (for information only, NOT a DME order):  {EQUIPMENT:028056396}  Other Treatments: ***    Patient's personal belongings (please select all that are sent with patient):  {[facility] DME Belongings:390399843}    RN SIGNATURE:  {Esignature:922259479}    CASE MANAGEMENT/SOCIAL WORK SECTION    Inpatient Status Date: ***    Readmission Risk Assessment Score:  Readmission Risk              Risk of Unplanned Readmission:  0           Discharging to Facility/ Agency   Name:   Address:  Phone:  Fax:    Dialysis Facility (if applicable)   Name:  Address:  Dialysis Schedule:  Phone:  Fax:    / signature: {Esignature:575009091}    PHYSICIAN SECTION    Prognosis: {Prognosis:3850780323}    Condition at Discharge: [address] Patient Condition:063192343}    Rehab Potential (if transferring to Rehab): {Prognosis:7886631687}    Recommended Labs or Other Treatments After Discharge: ***    Physician Certification: I certify the above information and transfer of Allyson Cook  is necessary for the continuing treatment of the diagnosis listed and that she requires {Admit to Appropriate Level of Care:27168} for {GREATER/LESS:839971938} 30 days.      Update Admission H&P: {CHP DME Changes in AAARB:950736208}    PHYSICIAN SIGNATURE:

## 2021-12-17 NOTE — PROGRESS NOTES
[facility]  History and Physical/Consultation  Podiatry    Referring Physician : Shonda Whitten MD  [address] RECORD NUMBER:  [id number]  AGE: 46 y.o. GENDER: female  : 1970  EPISODE DATE:  2021  Subjective:     Chief Complaint   Patient presents with    Wound Check     LLE         HISTORY of PRESENT ILLNESS HPI     Sukhdev Evangelista is a 46 y.o. female who presents today for wound/ulcer evaluation. History of Wound Context:  The patient has had a wound of left leg trauma which was first noted approximately months. This has been treated antibiotic. On their initial visit to the wound healing center, 20 ,  the patient has noted that the wound has not been improving. The patient has had similar previous wounds in the past.      Pt is not on abx at time of initial visit.       Wound/Ulcer Pain Timing/Severity: constant  Quality of pain: aching  Severity:  5 / 10   Modifying Factors: Pain worsens with walking  Associated Signs/Symptoms: edema, erythema and drainage    Ulcer Identification:  Ulcer Type: venous and traumatic  Contributing Factors: edema and venous stasis    Diabetic/Pressure/Non Pressure Ulcers onl y:  Ulcer: Non-Pressure ulcer, fat layer exposed    If patient has diabetic lower extremity wounds  Russell Classification of diabetic lower extremity wounds:    Grade Description   []  0 No open wound   []  1 Superficial ulcer involving the full skin thickness   []  2 Deep ulcer involves ligament, tendon, joint capsule, or fascia  No bone involvement or abscess presence   []  3 Deep Ulcer with abcess formation and/or osteomyelitis   []  4 Localized gangrene   []  5 Extensive gangrene of the foot     Wound: Crush Injury        21  Debrided, dermagraft applied  PAST MEDICAL HISTORY      Diagnosis Date    Hearing loss     congenital     Past Surgical History:   Procedure Laterality Date    FRACTURE SURGERY       Family History   Problem Relation Age of Onset Walter Hedger Hearing Loss Mother         deaf    Hearing Loss Father         deaf    Heart Disease Father         pacemaker     Social History     Tobacco Use    Smoking status: Former Smoker     Packs/day: 1.00     Years: 20.00     Pack years: 20.00     Types: Cigarettes     Quit date: 1/10/2007     Years since quittin.9    Smokeless tobacco: Never Used   Substance Use Topics    Alcohol use: Yes     Comment: 3 Seagrams per month    Drug use: Yes     Types: Marijuana (Weed)     Comment: daily     No Known Allergies  Current Outpatient Medications on File Prior to Encounter   Medication Sig Dispense Refill    acetaminophen (TYLENOL) 325 MG tablet Take 650 mg by mouth every 6 hours as needed for Pain      LYSINE PO Take by mouth      B Complex-C-E-Zn (STRESS B-COMPLEX/VIT C/ZINC) TABS Take 1 tablet by mouth daily 30 tablet 2    gabapentin (NEURONTIN) 400 MG capsule       calcium carbonate (OSCAL) 500 MG TABS tablet Take 500 mg by mouth daily       Biotin 1 MG CAPS Take by mouth       Multiple Vitamins-Minerals (THERAPEUTIC MULTIVITAMIN-MINERALS) tablet Take 1 tablet by mouth daily        No current facility-administered medications on file prior to encounter.        REVIEW OF SYSTEMS   ROS : All others Negative if blank [], Positive if [x]  General Vascular   [] Fevers [] Claudication   [] Chills [] Rest Pain   Skin Neurologic   [x] Tissue Loss [x] Lower extremity neuropathy       21  Debrided, cont tx and care, levaquin 500mg daily x 7 days  Objective:    /78   Pulse 72   Temp 96.6 °F (35.9 °C) (Temporal)   Resp 17   Ht 5' 6\" (1.676 m)   Wt 155 lb (70.3 kg)   BMI 25.02 kg/m²   Wt Readings from Last 3 Encounters:   21 155 lb (70.3 kg)   21 155 lb (70.3 kg)   10/29/21 155 lb (70.3 kg)       PHYSICAL EXAM   CONSTITUTIONAL:   Awake, alert, cooperative   PSYCHIATRIC :  Oriented to time, place and person      normal insight to disease process  EXTREMITIES:   R LE Open wounds are not 12/17/21 0809   Drainage Description Serosanguinous 12/17/21 0809   Odor None 12/17/21 0809   Hillary-wound Assessment Edematous; Fragile 12/08/21 0949   Wound Thickness Description not for Pressure Injury Full thickness 12/01/21 0931   Number of days: 15        Skin Substitute Applied:         [] APLIGRAF   []44 sq/cm   []88 sq/cm    []132 sq/cm  []176  sq/cm           [] DERMAGRAFT  [] 38sq/cm   []76 sq/cm    []114 sq/cm  []152 sq/cm         [] OASIS   [] 10.5 sq/cm   []21 sq/cm    []4 sq/cm PuraPly  []8 sq/cm PuraPly        [x] OTHER Puraply 4  [x]        sq/cm       Performed by: Emili Haywood DPM    Wound Type:venous    Consent obtained: Yes    Time out taken: Yes     Fenestrated: Yes    Instrument(s) curette      [] Mesher Utilized    Skin Substitute was Applied to Wound Number(s):     Wound #: 1      Wound 12/01/21 Leg Left; Medial #1  trauma (Active)   Wound Image   12/01/21 0916   Wound Etiology Traumatic 12/01/21 0916   Dressing Status New dressing applied 12/17/21 0838   Wound Cleansed Cleansed with saline 12/17/21 0838   Dressing/Treatment Other (comment) 12/17/21 0838   Offloading for Diabetic Foot Ulcers No offloading required 12/01/21 0958   Wound Length (cm) 1.7 cm 12/17/21 0809   Wound Width (cm) 1 cm 12/17/21 0809   Wound Depth (cm) 0.3 cm 12/17/21 0809   Wound Surface Area (cm^2) 1.7 cm^2 12/17/21 0809   Change in Wound Size % (l*w) 43.33 12/17/21 0809   Wound Volume (cm^3) 0.51 cm^3 12/17/21 0809   Wound Healing % 15 12/17/21 0809   Post-Procedure Length (cm) 1.7 cm 12/17/21 0817   Post-Procedure Width (cm) 1 cm 12/17/21 0817   Post-Procedure Depth (cm) 0.3 cm 12/17/21 0817   Post-Procedure Surface Area (cm^2) 1.7 cm^2 12/17/21 0817   Post-Procedure Volume (cm^3) 0.51 cm^3 12/17/21 0817   Undermining Starts ___ O'Clock 10 12/17/21 0809   Undermining Ends___ O'Clock 12 12/17/21 0809   Undermining Maxium Distance (cm) 0.7 @ 11 12/17/21 0809   Wound Assessment Fibrin; Pink/red 12/17/21 0809 Drainage Amount Moderate 12/17/21 0809   Drainage Description Serosanguinous 12/17/21 0809   Odor None 12/17/21 0809   Hillary-wound Assessment Edematous; Fragile 12/08/21 0949   Wound Thickness Description not for Pressure Injury Full thickness 12/01/21 0931   Number of days: 15         Total Surface Area Covered 2 sq/cm     Amount Wasted 0 sq/cm    Reason for Waste 0      Secured: Yes    Secured With:  [x]Steri Strips    []Sutures     []Staples []Other    Procedural Pain: 2/10     Post Procedural Pain: 4 / 10    Response to Treatment:  Well tolerated by patient. Procedure Note  Indications:  Based on my examination of this patient's wound(s)/ulcer(s) today, debridement is required to promote healing and evaluate the wound base. Performed by: Arlen Rios DPM    Consent obtained:  Yes    Time out taken:  Yes    Pain Control: Anesthetic  Anesthetic: 4% Lidocaine Liquid Topical     Debridement:Excisional Debridement    Using #15 blade scalpel the wound(s)/ulcer(s) was/were sharply debrided down through and including the removal of subcutaneous tissue. Devitalized Tissue Debrided:  fibrin, biofilm, slough and necrotic/eschar to stimulate bleeding to promote healing, post debridement good bleeding base and wound edges noted    Wound/Ulcer #: 1    Percent of Wound/Ulcer Debrided: 100%    Total Surface Area Debrided:  3 sq cm     Estimated Blood Loss:  Minimal  Hemostasis Achieved:  by pressure    Procedural Pain:  3  / 10   Post Procedural Pain:  4 / 10     Response to treatment:  Well tolerated by patient. A culture was done.       Plan:     Pt is not a smoker   - Discussed relationship of smoking and negative affects on wound healing   - Emphasized importance of tobacco avoidace/cessation   - Script for nicotine patch given to patient   - Information regarding support groups for smoking cessation given    In my professional opinion and based off the information that is available at this time this patient has appropriate indication for HBO Therapy: Maybe    Treatment Note please see attached Discharge Instructions    Written patient dismissal instructions given to patient and signed by patient or POA. Discharge Instructions       Visit Discharge/Physician Orders    Discharge condition: Stable    Assessment of pain at discharge:  NONE    Anesthetic used: 4% lidocaine solution    Discharge to: Home    Left via:Private automobile    Accompanied by: accompanied by self    ECF/[facility]HA:   [facility]  ATTENTION:  IN OBSERVATION OF THE HOLIDAYS, [place] [facility] WILL BE CLOSED FRIDAYS December 24TH AND 31ST    Dressing Orders:  CLEAN LEFT LEG ULCER WITH NORMAL SALINE. #2 of 10 puraply applied to left leg ulcer and steri stripped in place. Paulpetty Rakes Apply Kerlex and coban for one week    Treatment Orders:    LEVAQUIN 500 MG DAILY UNTIL DONE  TAKE BENADRYL FOR ITCHING     wound care  scheduled ARTERIAL STUDY FOR February 9TH AT 2:15 PM ARRIVAL. XRAY OF LEFT LEG reviewed    [facility] followup visit ___________One week with Dr. Winters__________________  (Please note your next appointment above and if you are unable to keep, kindly give a 24 hour notice. Thank you.)    Physician signature:__________________________      If you experience any of the following, please call the [address] [place] [address] during business hours:    * Increase in Pain  * Temperature over 101  * Increase in drainage from your wound  * Drainage with a foul odor  * Bleeding  * Increase in swelling  * Need for compression bandage changes due to slippage, breakthrough drainage. If you need medical attention outside of the business hours of the [address] [place] [address] please contact your PCP or go to the nearest emergency room.         Electronically signed by Morgan Payton DPM on 12/17/2021 at 9:07 AM

## 2021-12-22 ENCOUNTER — HOSPITAL ENCOUNTER (OUTPATIENT)
Dept: WOUND CARE | Age: 51
Discharge: HOME | End: 2021-12-22
Payer: MEDICARE

## 2021-12-22 VITALS
DIASTOLIC BLOOD PRESSURE: 66 MMHG | BODY MASS INDEX: 24.91 KG/M2 | RESPIRATION RATE: 15 BRPM | HEIGHT: 66 IN | TEMPERATURE: 97.7 F | SYSTOLIC BLOOD PRESSURE: 112 MMHG | HEART RATE: 85 BPM | WEIGHT: 155 LBS

## 2021-12-22 DIAGNOSIS — L97.922 TRAUMATIC ULCER OF LEFT LOWER LEG WITH FAT LAYER EXPOSED (HCC): Primary | ICD-10-CM

## 2021-12-22 PROCEDURE — 6370000000 HC RX 637 (ALT 250 FOR IP): Performed by: PODIATRIST

## 2021-12-22 PROCEDURE — 15271 SKIN SUB GRAFT TRNK/ARM/LEG: CPT

## 2021-12-22 RX ORDER — BACITRACIN ZINC AND POLYMYXIN B SULFATE 500; 1000 [USP'U]/G; [USP'U]/G
OINTMENT TOPICAL ONCE
Status: CANCELLED | OUTPATIENT
Start: 2021-12-22 | End: 2021-12-22

## 2021-12-22 RX ORDER — LIDOCAINE 50 MG/G
OINTMENT TOPICAL ONCE
Status: CANCELLED | OUTPATIENT
Start: 2021-12-22 | End: 2021-12-22

## 2021-12-22 RX ORDER — GENTAMICIN SULFATE 1 MG/G
OINTMENT TOPICAL ONCE
Status: CANCELLED | OUTPATIENT
Start: 2021-12-22 | End: 2021-12-22

## 2021-12-22 RX ORDER — LIDOCAINE 40 MG/G
CREAM TOPICAL ONCE
Status: CANCELLED | OUTPATIENT
Start: 2021-12-22 | End: 2021-12-22

## 2021-12-22 RX ORDER — BACITRACIN, NEOMYCIN, POLYMYXIN B 400; 3.5; 5 [USP'U]/G; MG/G; [USP'U]/G
OINTMENT TOPICAL ONCE
Status: CANCELLED | OUTPATIENT
Start: 2021-12-22 | End: 2021-12-22

## 2021-12-22 RX ORDER — LIDOCAINE HYDROCHLORIDE 20 MG/ML
JELLY TOPICAL ONCE
Status: CANCELLED | OUTPATIENT
Start: 2021-12-22 | End: 2021-12-22

## 2021-12-22 RX ORDER — BETAMETHASONE DIPROPIONATE 0.05 %
OINTMENT (GRAM) TOPICAL ONCE
Status: CANCELLED | OUTPATIENT
Start: 2021-12-22 | End: 2021-12-22

## 2021-12-22 RX ORDER — LIDOCAINE HYDROCHLORIDE 40 MG/ML
SOLUTION TOPICAL ONCE
Status: CANCELLED | OUTPATIENT
Start: 2021-12-22 | End: 2021-12-22

## 2021-12-22 RX ORDER — LIDOCAINE HYDROCHLORIDE 40 MG/ML
SOLUTION TOPICAL ONCE
Status: COMPLETED | OUTPATIENT
Start: 2021-12-22 | End: 2021-12-22

## 2021-12-22 RX ORDER — CLOBETASOL PROPIONATE 0.5 MG/G
OINTMENT TOPICAL ONCE
Status: CANCELLED | OUTPATIENT
Start: 2021-12-22 | End: 2021-12-22

## 2021-12-22 RX ORDER — GINSENG 100 MG
CAPSULE ORAL ONCE
Status: CANCELLED | OUTPATIENT
Start: 2021-12-22 | End: 2021-12-22

## 2021-12-22 RX ADMIN — LIDOCAINE HYDROCHLORIDE: 40 SOLUTION TOPICAL at 08:58

## 2021-12-22 NOTE — PROGRESS NOTES
PuraPly AMTreatment Note    NAME:  Angela Zhou  YOB: 1970  MEDICAL RECORD NUMBER:  [id number]  DATE:  12/22/2021    Goal:  Patient will receive safe and proper application of skin substitute. Patient will comply with caring for dressing, and reporting complications. Expiration date checked immediately prior to use. Package intact prior to use and no damage noted. Transport temperature controlled and acceptable. PuraPly AM was removed from protective sterile packaging by provider and applied to prepared ulcer bed. PuraPly AM was hydrated with sterile normal saline per provider. PuraPly AM was applied to left leg ulcer and affixed with steri-strips by the provider. PuraPly AM was covered with non-adherent ulcer dressing. Applied steri strip applied to secure graft3 over non-adherent. Applied dry gauze and/or roll gauze. Patient/caregiver was instructed not to remove dressing and to keep it clean and dry. Pt/family/caregiver was instructed on signs and symptoms of complications to report such as draining through dressing, dressing falling down/slipping, getting wet, or severe pain or tingling. PuraPly AM may be applied a total of 10 times per wound over a 12 week period. Date of first application of PuraPly for this current wound is December 8, 2021.     Guidelines followed    Electronically signed by Anne Vincent RN on 12/22/2021 at 2:06 PM

## 2021-12-22 NOTE — PROGRESS NOTES
W[facility] Healing Center  History and Physical/Consultation  Podiatry    Referring Physician : Richard Delgado MD  [address] RECORD NUMBER:  [id number]  AGE: 46 y.o. GENDER: female  : 1970  EPISODE DATE:  2021  Subjective:     Chief Complaint   Patient presents with    Wound Check     LLE         HISTORY of PRESENT ILLNESS HPI     Zoe Mead is a 46 y.o. female who presents today for wound/ulcer evaluation. History of Wound Context:  The patient has had a wound of left leg trauma which was first noted approximately months. This has been treated antibiotic. On their initial visit to the wound healing center, 20 ,  the patient has noted that the wound has not been improving. The patient has had similar previous wounds in the past.      Pt is not on abx at time of initial visit.       Wound/Ulcer Pain Timing/Severity: constant  Quality of pain: aching  Severity:  5 / 10   Modifying Factors: Pain worsens with walking  Associated Signs/Symptoms: edema, erythema and drainage    Ulcer Identification:  Ulcer Type: venous and traumatic  Contributing Factors: edema and venous stasis    Diabetic/Pressure/Non Pressure Ulcers onl y:  Ulcer: Non-Pressure ulcer, fat layer exposed    If patient has diabetic lower extremity wounds  Russell Classification of diabetic lower extremity wounds:    Grade Description   []  0 No open wound   []  1 Superficial ulcer involving the full skin thickness   []  2 Deep ulcer involves ligament, tendon, joint capsule, or fascia  No bone involvement or abscess presence   []  3 Deep Ulcer with abcess formation and/or osteomyelitis   []  4 Localized gangrene   []  5 Extensive gangrene of the foot     Wound: Crush Injury        21  Debrided, puraply  applied    21  Debrided, puraply applied   PAST MEDICAL HISTORY      Diagnosis Date    Hearing loss     congenital     Past Surgical History:   Procedure Laterality Date    FRACTURE SURGERY       Family History   Problem Relation Age of Onset   Yaya Umana Hearing Loss Mother         deaf    Hearing Loss Father         deaf    Heart Disease Father         pacemaker     Social History     Tobacco Use    Smoking status: Former Smoker     Packs/day: 1.00     Years: 20.00     Pack years: 20.00     Types: Cigarettes     Quit date: 1/10/2007     Years since quittin.9    Smokeless tobacco: Never Used   Substance Use Topics    Alcohol use: Yes     Comment: 3 Seagrams per month    Drug use: Yes     Types: Marijuana (Weed)     Comment: daily     No Known Allergies  Current Outpatient Medications on File Prior to Encounter   Medication Sig Dispense Refill    acetaminophen (TYLENOL) 325 MG tablet Take 650 mg by mouth every 6 hours as needed for Pain      LYSINE PO Take by mouth      B Complex-C-E-Zn (STRESS B-COMPLEX/VIT C/ZINC) TABS Take 1 tablet by mouth daily 30 tablet 2    gabapentin (NEURONTIN) 400 MG capsule       calcium carbonate (OSCAL) 500 MG TABS tablet Take 500 mg by mouth daily       Biotin 1 MG CAPS Take by mouth       Multiple Vitamins-Minerals (THERAPEUTIC MULTIVITAMIN-MINERALS) tablet Take 1 tablet by mouth daily        No current facility-administered medications on file prior to encounter.        REVIEW OF SYSTEMS   ROS : All others Negative if blank [], Positive if [x]  General Vascular   [] Fevers [] Claudication   [] Chills [] Rest Pain   Skin Neurologic   [x] Tissue Loss [x] Lower extremity neuropathy       21  Debrided, cont tx and care, levaquin 500mg daily x 7 days  Objective:    /66   Pulse 85   Temp 97.7 °F (36.5 °C) (Temporal)   Resp 15   Ht 5' 6\" (1.676 m)   Wt 155 lb (70.3 kg)   BMI 25.02 kg/m²   Wt Readings from Last 3 Encounters:   21 155 lb (70.3 kg)   21 155 lb (70.3 kg)   21 155 lb (70.3 kg)       PHYSICAL EXAM   CONSTITUTIONAL:   Awake, alert, cooperative   PSYCHIATRIC :  Oriented to time, place and person      normal insight to disease process  EXTREMITIES:   R LE Open wounds are not noted   Skin color is normal   Edema is  noted   Sensation intact to light touch   Palpation of the foot does cause pain   5/5 strength DF/PF  L LE Open wounds are noted   Skin color is abnormal with ulcer   Edema is  noted   Sensation intact to light touch   Palpation of the foot does cause pain   5/5 strength DF/PF  R dorsalis pedis 1 L dorsalis pedis 1   R posterior tibial 1 L posterior tibial 1     Assessment:     Problem List Items Addressed This Visit     Traumatic ulcer of left lower leg with fat layer exposed ([address] - Primary    Relevant Orders    Initiate Outpatient Wound Care Protocol          Pre Debridement Measurements:  Are located in the [facility]  Documentation Flow Sheet  Post Debridement Measurements:  Wound/Ulcer Descriptions are Pre Debridement except measurements:     Wound 12/01/21 Leg Left; Medial #1  trauma (Active)   Wound Image   12/01/21 0916   Wound Etiology Traumatic 12/01/21 0916   Dressing Status New dressing applied 12/22/21 0919   Wound Cleansed Cleansed with saline 12/22/21 0919   Dressing/Treatment Other (comment) 12/22/21 0919   Offloading for Diabetic Foot Ulcers No offloading required 12/01/21 0958   Wound Length (cm) 1.5 cm 12/22/21 0851   Wound Width (cm) 1 cm 12/22/21 0851   Wound Depth (cm) 0.3 cm 12/22/21 0851   Wound Surface Area (cm^2) 1.5 cm^2 12/22/21 0851   Change in Wound Size % (l*w) 50 12/22/21 0851   Wound Volume (cm^3) 0.45 cm^3 12/22/21 0851   Wound Healing % 25 12/22/21 0851   Post-Procedure Length (cm) 1.5 cm 12/22/21 0919   Post-Procedure Width (cm) 1 cm 12/22/21 0919   Post-Procedure Depth (cm) 0.3 cm 12/22/21 0919   Post-Procedure Surface Area (cm^2) 1.5 cm^2 12/22/21 0919   Post-Procedure Volume (cm^3) 0.45 cm^3 12/22/21 0919   Undermining Starts ___ O'Clock 10 12/17/21 0809   Undermining Ends___ O'Clock 12 12/17/21 0809   Undermining Maxium Distance (cm) 0.7 @ 11 12/17/21 0809   Wound Assessment Fibrin;Pink/red 12/22/21 0851   Drainage Amount Moderate 12/22/21 0851   Drainage Description Serosanguinous 12/22/21 0851   Odor None 12/22/21 0851   Hillary-wound Assessment Edematous;Fragile 12/22/21 0851   Wound Thickness Description not for Pressure Injury Full thickness 12/01/21 0931   Number of days: 21        Skin Substitute Applied:         [] APLIGRAF   []44 sq/cm   []88 sq/cm    []132 sq/cm  []176  sq/cm           [] DERMAGRAFT  [] 38sq/cm   []76 sq/cm    []114 sq/cm  []152 sq/cm         [] OASIS   [] 10.5 sq/cm   []21 sq/cm    []4 sq/cm PuraPly  []8 sq/cm PuraPly        [x] OTHER Puraply 4  [x]        sq/cm       Performed by: Miriam Jackson DPM    Wound Type:venous    Consent obtained: Yes    Time out taken: Yes     Fenestrated: Yes    Instrument(s) curette      [] Mesher Utilized    Skin Substitute was Applied to Wound Number(s):     Wound #: 1      Wound 12/01/21 Leg Left; Medial #1  trauma (Active)   Wound Image   12/01/21 0916   Wound Etiology Traumatic 12/01/21 0916   Dressing Status New dressing applied 12/17/21 0838   Wound Cleansed Cleansed with saline 12/17/21 0838   Dressing/Treatment Other (comment) 12/17/21 0838   Offloading for Diabetic Foot Ulcers No offloading required 12/01/21 0958   Wound Length (cm) 1.7 cm 12/17/21 0809   Wound Width (cm) 1 cm 12/17/21 0809   Wound Depth (cm) 0.3 cm 12/17/21 0809   Wound Surface Area (cm^2) 1.7 cm^2 12/17/21 0809   Change in Wound Size % (l*w) 43.33 12/17/21 0809   Wound Volume (cm^3) 0.51 cm^3 12/17/21 0809   Wound Healing % 15 12/17/21 0809   Post-Procedure Length (cm) 1.7 cm 12/17/21 0817   Post-Procedure Width (cm) 1 cm 12/17/21 0817   Post-Procedure Depth (cm) 0.3 cm 12/17/21 0817   Post-Procedure Surface Area (cm^2) 1.7 cm^2 12/17/21 0817   Post-Procedure Volume (cm^3) 0.51 cm^3 12/17/21 0817   Undermining Starts ___ O'Clock 10 12/17/21 0809   Undermining Ends___ O'Clock 12 12/17/21 0809   Undermining Maxium Distance (cm) 0.7 @ 11 12/17/21 5155   Wound Assessment Fibrin; Pink/red 12/17/21 0809   Drainage Amount Moderate 12/17/21 0809   Drainage Description Serosanguinous 12/17/21 0809   Odor None 12/17/21 0809   Hillary-wound Assessment Edematous; Fragile 12/08/21 0949   Wound Thickness Description not for Pressure Injury Full thickness 12/01/21 0931   Number of days: 15         Total Surface Area Covered 2 sq/cm     Amount Wasted 0 sq/cm    Reason for Waste 0      Secured: Yes    Secured With:  [x]Steri Strips    []Sutures     []Staples []Other    Procedural Pain: 2/10     Post Procedural Pain: 4 / 10    Response to Treatment:  Well tolerated by patient. Procedure Note  Indications:  Based on my examination of this patient's wound(s)/ulcer(s) today, debridement is required to promote healing and evaluate the wound base. Performed by: Azra Quintana DPM    Consent obtained:  Yes    Time out taken:  Yes    Pain Control: Anesthetic  Anesthetic: 4% Lidocaine Liquid Topical     Debridement:Excisional Debridement    Using #15 blade scalpel the wound(s)/ulcer(s) was/were sharply debrided down through and including the removal of subcutaneous tissue. Devitalized Tissue Debrided:  fibrin, biofilm, slough and necrotic/eschar to stimulate bleeding to promote healing, post debridement good bleeding base and wound edges noted    Wound/Ulcer #: 1    Percent of Wound/Ulcer Debrided: 100%    Total Surface Area Debrided:  3 sq cm     Estimated Blood Loss:  Minimal  Hemostasis Achieved:  by pressure    Procedural Pain:  3  / 10   Post Procedural Pain:  4 / 10     Response to treatment:  Well tolerated by patient. A culture was done.       Plan:     Pt is not a smoker   - Discussed relationship of smoking and negative affects on wound healing   - Emphasized importance of tobacco avoidace/cessation   - Script for nicotine patch given to patient   - Information regarding support groups for smoking cessation given    In my professional opinion and based off the information that is available at this time this patient has appropriate indication for HBO Therapy: Maybe    Treatment Note please see attached Discharge Instructions    Written patient dismissal instructions given to patient and signed by patient or POA. Discharge Instructions       Visit Discharge/Physician Orders    Discharge condition: Stable    Assessment of pain at discharge:  NONE    Anesthetic used: 4% lidocaine solution    Discharge to: Home    Left via:Private automobile    Accompanied by: accompanied by self    ECF/HHA:   [facility]  ATTENTION:  IN OBSERVATION OF THE HOLIDAYS, [place] [facility] WILL BE CLOSED FRIDAYS December 24TH AND 31ST    Dressing Orders:  CLEAN LEFT LEG ULCER WITH NORMAL SALINE. #3 of 10 puraply applied to left leg ulcer and steri stripped in place. Raquel Arellano Apply Kerlex and coban for one week    Treatment Orders:      wound care  scheduled ARTERIAL STUDY FOR February 9TH AT 2:15 PM ARRIVAL. [address] [place] [address],[address] followup visit ___________One week with Dr. Winters__________________  (Please note your next appointment above and if you are unable to keep, kindly give a 24 hour notice. Thank you.)    Physician signature:__________________________      If you experience any of the following, please call the [address] [place] [address] Road during business hours:    * Increase in Pain  * Temperature over 101  * Increase in drainage from your wound  * Drainage with a foul odor  * Bleeding  * Increase in swelling  * Need for compression bandage changes due to slippage, breakthrough drainage. If you need medical attention outside of the business hours of the [address] [place] [address] Road please contact your PCP or go to the nearest emergency room.         Electronically signed by Shannen Casillas DPM on 12/22/2021 at 10:27 AM

## 2021-12-29 ENCOUNTER — HOSPITAL ENCOUNTER (OUTPATIENT)
Dept: WOUND CARE | Age: 51
Discharge: HOME | End: 2021-12-29
Payer: MEDICARE

## 2021-12-29 VITALS
TEMPERATURE: 96.8 F | SYSTOLIC BLOOD PRESSURE: 110 MMHG | RESPIRATION RATE: 18 BRPM | HEART RATE: 65 BPM | DIASTOLIC BLOOD PRESSURE: 58 MMHG

## 2021-12-29 DIAGNOSIS — L97.922 TRAUMATIC ULCER OF LEFT LOWER LEG WITH FAT LAYER EXPOSED (HCC): Primary | ICD-10-CM

## 2021-12-29 PROCEDURE — 6370000000 HC RX 637 (ALT 250 FOR IP): Performed by: PODIATRIST

## 2021-12-29 PROCEDURE — 15271 SKIN SUB GRAFT TRNK/ARM/LEG: CPT

## 2021-12-29 RX ORDER — CLOBETASOL PROPIONATE 0.5 MG/G
OINTMENT TOPICAL ONCE
Status: CANCELLED | OUTPATIENT
Start: 2021-12-29 | End: 2021-12-29

## 2021-12-29 RX ORDER — LIDOCAINE 40 MG/G
CREAM TOPICAL ONCE
Status: CANCELLED | OUTPATIENT
Start: 2021-12-29 | End: 2021-12-29

## 2021-12-29 RX ORDER — BACITRACIN, NEOMYCIN, POLYMYXIN B 400; 3.5; 5 [USP'U]/G; MG/G; [USP'U]/G
OINTMENT TOPICAL ONCE
Status: CANCELLED | OUTPATIENT
Start: 2021-12-29 | End: 2021-12-29

## 2021-12-29 RX ORDER — GINSENG 100 MG
CAPSULE ORAL ONCE
Status: CANCELLED | OUTPATIENT
Start: 2021-12-29 | End: 2021-12-29

## 2021-12-29 RX ORDER — LIDOCAINE HYDROCHLORIDE 20 MG/ML
JELLY TOPICAL ONCE
Status: CANCELLED | OUTPATIENT
Start: 2021-12-29 | End: 2021-12-29

## 2021-12-29 RX ORDER — GENTAMICIN SULFATE 1 MG/G
OINTMENT TOPICAL ONCE
Status: CANCELLED | OUTPATIENT
Start: 2021-12-29 | End: 2021-12-29

## 2021-12-29 RX ORDER — BACITRACIN ZINC AND POLYMYXIN B SULFATE 500; 1000 [USP'U]/G; [USP'U]/G
OINTMENT TOPICAL ONCE
Status: CANCELLED | OUTPATIENT
Start: 2021-12-29 | End: 2021-12-29

## 2021-12-29 RX ORDER — LIDOCAINE HYDROCHLORIDE 40 MG/ML
SOLUTION TOPICAL ONCE
Status: COMPLETED | OUTPATIENT
Start: 2021-12-29 | End: 2021-12-29

## 2021-12-29 RX ORDER — LIDOCAINE 50 MG/G
OINTMENT TOPICAL ONCE
Status: CANCELLED | OUTPATIENT
Start: 2021-12-29 | End: 2021-12-29

## 2021-12-29 RX ORDER — LIDOCAINE HYDROCHLORIDE 40 MG/ML
SOLUTION TOPICAL ONCE
Status: CANCELLED | OUTPATIENT
Start: 2021-12-29 | End: 2021-12-29

## 2021-12-29 RX ORDER — BETAMETHASONE DIPROPIONATE 0.05 %
OINTMENT (GRAM) TOPICAL ONCE
Status: CANCELLED | OUTPATIENT
Start: 2021-12-29 | End: 2021-12-29

## 2021-12-29 RX ADMIN — LIDOCAINE HYDROCHLORIDE 10 ML: 40 SOLUTION TOPICAL at 09:05

## 2021-12-29 NOTE — PROGRESS NOTES
PuraPly AMTreatment Note    NAME:  Jaquan Holland  YOB: 1970  MEDICAL RECORD NUMBER:  [id number]  DATE:  12/29/2021    Goal:  Patient will receive safe and proper application of skin substitute. Patient will comply with caring for dressing, and reporting complications. Expiration date checked immediately prior to use. Package intact prior to use and no damage noted. Transport temperature controlled and acceptable. PuraPly AM was removed from protective sterile packaging by provider and applied to prepared ulcer bed. PuraPly AM was hydrated with sterile normal saline per provider. PuraPly AM was applied to left leg ulcer and affixed with steri-strips by the provider. PuraPly AM was covered with non-adherent ulcer dressing. Applied steri strip applied over graft to secure over non-adherent. Applied dry gauze and/or roll gauze. Patient/caregiver was instructed not to remove dressing and to keep it clean and dry. Pt/family/caregiver was instructed on signs and symptoms of complications to report such as draining through dressing, dressing falling down/slipping, getting wet, or severe pain or tingling. PuraPly AM may be applied a total of 10 times per wound over a 12 week period. Date of first application of PuraPly for this current wound is December 8, 2021.     Guidelines followed    Electronically signed by Adonay Alatorre RN on 12/29/2021 at 1:14 PM

## 2021-12-29 NOTE — PROGRESS NOTES
[facility]  History and Physical/Consultation  Podiatry    Referring Physician : Yessica Joshi MD  [facility] [address] RECORD NUMBER:  [id number]  AGE: 46 y.o. GENDER: female  : 1970  EPISODE DATE:  2021  Subjective:     Chief Complaint   Patient presents with    Wound Check         HISTORY of PRESENT ILLNESS HPI     Isi Chaparro is a 46 y.o. female who presents today for wound/ulcer evaluation. History of Wound Context:  The patient has had a wound of left leg trauma which was first noted approximately months. This has been treated antibiotic. On their initial visit to the wound healing center, 20 ,  the patient has noted that the wound has not been improving. The patient has had similar previous wounds in the past.      Pt is not on abx at time of initial visit.       Wound/Ulcer Pain Timing/Severity: constant  Quality of pain: aching  Severity:  5 / 10   Modifying Factors: Pain worsens with walking  Associated Signs/Symptoms: edema, erythema and drainage    Ulcer Identification:  Ulcer Type: venous and traumatic  Contributing Factors: edema and venous stasis    Diabetic/Pressure/Non Pressure Ulcers onl y:  Ulcer: Non-Pressure ulcer, fat layer exposed    If patient has diabetic lower extremity wounds  Russell Classification of diabetic lower extremity wounds:    Grade Description   []  0 No open wound   []  1 Superficial ulcer involving the full skin thickness   []  2 Deep ulcer involves ligament, tendon, joint capsule, or fascia  No bone involvement or abscess presence   []  3 Deep Ulcer with abcess formation and/or osteomyelitis   []  4 Localized gangrene   []  5 Extensive gangrene of the foot     Wound: Crush Injury        21  Debrided, puraply  applied    21  Debrided, puraply applied      21  Debrided, puraply applied   PAST MEDICAL HISTORY      Diagnosis Date    Hearing loss     congenital     Past Surgical History:   Procedure Laterality Date    FRACTURE SURGERY       Family History   Problem Relation Age of Onset   Tian Hearing Loss Mother         deaf    Hearing Loss Father         deaf    Heart Disease Father         pacemaker     Social History     Tobacco Use    Smoking status: Former Smoker     Packs/day: 1.00     Years: 20.00     Pack years: 20.00     Types: Cigarettes     Quit date: 1/10/2007     Years since quittin.9    Smokeless tobacco: Never Used   Vaping Use    Vaping Use: Never used   Substance Use Topics    Alcohol use: Yes     Comment: 3 Seagrams per month    Drug use: Yes     Types: Marijuana (Weed)     Comment: daily     No Known Allergies  Current Outpatient Medications on File Prior to Encounter   Medication Sig Dispense Refill    acetaminophen (TYLENOL) 325 MG tablet Take 650 mg by mouth every 6 hours as needed for Pain      LYSINE PO Take by mouth      B Complex-C-E-Zn (STRESS B-COMPLEX/VIT C/ZINC) TABS Take 1 tablet by mouth daily 30 tablet 2    calcium carbonate (OSCAL) 500 MG TABS tablet Take 500 mg by mouth daily       Biotin 1 MG CAPS Take by mouth       Multiple Vitamins-Minerals (THERAPEUTIC MULTIVITAMIN-MINERALS) tablet Take 1 tablet by mouth daily       gabapentin (NEURONTIN) 400 MG capsule        No current facility-administered medications on file prior to encounter.        REVIEW OF SYSTEMS   ROS : All others Negative if blank [], Positive if [x]  General Vascular   [] Fevers [] Claudication   [] Chills [] Rest Pain   Skin Neurologic   [x] Tissue Loss [x] Lower extremity neuropathy       21  Debrided, cont tx and care, levaquin 500mg daily x 7 days  Objective:    BP (!) 110/58   Pulse 65   Temp 96.8 °F (36 °C)   Resp 18   Wt Readings from Last 3 Encounters:   21 155 lb (70.3 kg)   21 155 lb (70.3 kg)   21 155 lb (70.3 kg)       PHYSICAL EXAM   CONSTITUTIONAL:   Awake, alert, cooperative   PSYCHIATRIC :  Oriented to time, place and person      normal insight to disease process  EXTREMITIES:   R LE Open wounds are not noted   Skin color is normal   Edema is  noted   Sensation intact to light touch   Palpation of the foot does cause pain   5/5 strength DF/PF  L LE Open wounds are noted   Skin color is abnormal with ulcer   Edema is  noted   Sensation intact to light touch   Palpation of the foot does cause pain   5/5 strength DF/PF  R dorsalis pedis 1 L dorsalis pedis 1   R posterior tibial 1 L posterior tibial 1     Assessment:     Problem List Items Addressed This Visit     Traumatic ulcer of left lower leg with fat layer exposed ([address] - Primary    Relevant Orders    Initiate Outpatient Wound Care Protocol          Pre Debridement Measurements:  Are located in the [place]  Documentation Flow Sheet  Post Debridement Measurements:  Wound/Ulcer Descriptions are Pre Debridement except measurements:     Wound 12/01/21 Leg Left; Medial #1  trauma (Active)   Wound Image   12/01/21 0916   Wound Etiology Traumatic 12/01/21 0916   Dressing Status New dressing applied 12/22/21 0919   Wound Cleansed Cleansed with saline 12/22/21 0919   Dressing/Treatment Other (comment) 12/22/21 0919   Offloading for Diabetic Foot Ulcers No offloading required 12/01/21 0958   Wound Length (cm) 1.2 cm 12/29/21 0858   Wound Width (cm) 0.7 cm 12/29/21 0858   Wound Depth (cm) 0.3 cm 12/29/21 0858   Wound Surface Area (cm^2) 0.84 cm^2 12/29/21 0858   Change in Wound Size % (l*w) 72 12/29/21 0858   Wound Volume (cm^3) 0.252 cm^3 12/29/21 0858   Wound Healing % 58 12/29/21 0858   Post-Procedure Length (cm) 1.5 cm 12/22/21 0919   Post-Procedure Width (cm) 1 cm 12/22/21 0919   Post-Procedure Depth (cm) 0.3 cm 12/22/21 0919   Post-Procedure Surface Area (cm^2) 1.5 cm^2 12/22/21 0919   Post-Procedure Volume (cm^3) 0.45 cm^3 12/22/21 0919   Undermining Starts ___ O'Clock 10 12/17/21 0809   Undermining Ends___ O'Clock 12 12/17/21 0809   Undermining Maxium Distance (cm) 0.7 @ 11 12/17/21 0809   Wound Assessment Fibrin;Pink/red 12/29/21 0858   Drainage Amount Small 12/29/21 0858   Drainage Description Serosanguinous 12/29/21 0858   Odor None 12/29/21 0858   Hillary-wound Assessment Intact 12/29/21 0858   Wound Thickness Description not for Pressure Injury Full thickness 12/01/21 0931   Number of days: 28        Skin Substitute Applied:         [] APLIGRAF   []44 sq/cm   []88 sq/cm    []132 sq/cm  []176  sq/cm           [] DERMAGRAFT  [] 38sq/cm   []76 sq/cm    []114 sq/cm  []152 sq/cm         [] OASIS   [] 10.5 sq/cm   []21 sq/cm    []4 sq/cm PuraPly  []8 sq/cm PuraPly        [x] OTHER Puraply 4  [x]        sq/cm       Performed by: Morgan Payton DPM    Wound Type:venous    Consent obtained: Yes    Time out taken: Yes     Fenestrated: Yes    Instrument(s) curette      [] Mesher Utilized    Skin Substitute was Applied to Wound Number(s):     Wound #: 1      Wound 12/01/21 Leg Left; Medial #1  trauma (Active)   Wound Image   12/01/21 0916   Wound Etiology Traumatic 12/01/21 0916   Dressing Status New dressing applied 12/17/21 0838   Wound Cleansed Cleansed with saline 12/17/21 0838   Dressing/Treatment Other (comment) 12/17/21 0838   Offloading for Diabetic Foot Ulcers No offloading required 12/01/21 0958   Wound Length (cm) 1.7 cm 12/17/21 0809   Wound Width (cm) 1 cm 12/17/21 0809   Wound Depth (cm) 0.3 cm 12/17/21 0809   Wound Surface Area (cm^2) 1.7 cm^2 12/17/21 0809   Change in Wound Size % (l*w) 43.33 12/17/21 0809   Wound Volume (cm^3) 0.51 cm^3 12/17/21 0809   Wound Healing % 15 12/17/21 0809   Post-Procedure Length (cm) 1.7 cm 12/17/21 0817   Post-Procedure Width (cm) 1 cm 12/17/21 0817   Post-Procedure Depth (cm) 0.3 cm 12/17/21 0817   Post-Procedure Surface Area (cm^2) 1.7 cm^2 12/17/21 0817   Post-Procedure Volume (cm^3) 0.51 cm^3 12/17/21 0817   Undermining Starts ___ O'Clock 10 12/17/21 0809   Undermining Ends___ O'Clock 12 12/17/21 0809   Undermining Maxium Distance (cm) 0.7 @ 11 12/17/21 0809   Wound Assessment Fibrin; Pink/red 12/17/21 0809   Drainage Amount Moderate 12/17/21 0809   Drainage Description Serosanguinous 12/17/21 0809   Odor None 12/17/21 0809   Hillary-wound Assessment Edematous; Fragile 12/08/21 0949   Wound Thickness Description not for Pressure Injury Full thickness 12/01/21 0931   Number of days: 15         Total Surface Area Covered 2 sq/cm     Amount Wasted 0 sq/cm    Reason for Waste 0      Secured: Yes    Secured With:  [x]Steri Strips    []Sutures     []Staples []Other    Procedural Pain: 2/10     Post Procedural Pain: 4 / 10    Response to Treatment:  Well tolerated by patient. Procedure Note  Indications:  Based on my examination of this patient's wound(s)/ulcer(s) today, debridement is required to promote healing and evaluate the wound base. Performed by: Rasheeda Jackson DPM    Consent obtained:  Yes    Time out taken:  Yes    Pain Control: Anesthetic  Anesthetic: 4% Lidocaine Liquid Topical     Debridement:Excisional Debridement    Using #15 blade scalpel the wound(s)/ulcer(s) was/were sharply debrided down through and including the removal of subcutaneous tissue. Devitalized Tissue Debrided:  fibrin, biofilm, slough and necrotic/eschar to stimulate bleeding to promote healing, post debridement good bleeding base and wound edges noted    Wound/Ulcer #: 1    Percent of Wound/Ulcer Debrided: 100%    Total Surface Area Debrided:  2 sq cm     Estimated Blood Loss:  Minimal  Hemostasis Achieved:  by pressure    Procedural Pain:  3  / 10   Post Procedural Pain:  4 / 10     Response to treatment:  Well tolerated by patient. A culture was done.       Plan:     Pt is not a smoker   - Discussed relationship of smoking and negative affects on wound healing   - Emphasized importance of tobacco avoidace/cessation   - Script for nicotine patch given to patient   - Information regarding support groups for smoking cessation given    In my professional opinion and based off the information that is available at this time this patient has appropriate indication for HBO Therapy: Maybe    Treatment Note please see attached Discharge Instructions    Written patient dismissal instructions given to patient and signed by patient or POA. Discharge Instructions       Visit Discharge/Physician Orders    Discharge condition: Stable    Assessment of pain at discharge:  NONE    Anesthetic used: 4% lidocaine solution    Discharge to: Home    Left via:Private automobile    Accompanied by: accompanied by self    ECF/[facility]HA:   [facility]  ATTENTION:  IN OBSERVATION OF THE HOLIDAYS, [facility] WILL BE CLOSED FRIDAYS December 24TH AND 31ST    Dressing Orders:  CLEAN LEFT LEG ULCER WITH NORMAL SALINE. #4 of 10 puraply applied to left leg ulcer and steri stripped in place. Raquel Adan Apply Kerlex and coban for one week    Treatment Orders:      wound care  scheduled ARTERIAL STUDY FOR February 9TH AT 2:15 PM ARRIVAL. [facility] followup visit ___________One week with Dr. Winters__________________  (Please note your next appointment above and if you are unable to keep, kindly give a 24 hour notice. Thank you.)    Physician signature:__________________________      If you experience any of the following, please call the [address] [place] [address] Road during business hours:    * Increase in Pain  * Temperature over 101  * Increase in drainage from your wound  * Drainage with a foul odor  * Bleeding  * Increase in swelling  * Need for compression bandage changes due to slippage, breakthrough drainage. If you need medical attention outside of the business hours of the [address] [place] [address] Road please contact your PCP or go to the nearest emergency room.         Electronically signed by Shannen Casillas DPM on 12/29/2021 at 9:27 AM

## 2022-01-04 ENCOUNTER — TREATMENT (OUTPATIENT)
Dept: PHYSICAL THERAPY | Age: 52
End: 2022-01-04
Payer: MEDICARE

## 2022-01-04 DIAGNOSIS — S82.892D CLOSED LEFT ANKLE FRACTURE, WITH ROUTINE HEALING, SUBSEQUENT ENCOUNTER: Primary | ICD-10-CM

## 2022-01-04 PROCEDURE — 97112 NEUROMUSCULAR REEDUCATION: CPT | Performed by: PHYSICAL THERAPIST

## 2022-01-04 PROCEDURE — 97530 THERAPEUTIC ACTIVITIES: CPT | Performed by: PHYSICAL THERAPIST

## 2022-01-04 PROCEDURE — 97110 THERAPEUTIC EXERCISES: CPT | Performed by: PHYSICAL THERAPIST

## 2022-01-04 NOTE — PROGRESS NOTES
[address] [facility]   Phone: [phone]   Fax: [phone]      Physical Therapy Daily Treatment Note    Date: 2022  Patient Name: Mode Funes  : 1970   MRN: [id number]  DOInjury:    DOSx:   Referring Provider: Jesus Nicholson DO  [facility]. [facility],  [address] [place] [address]     Medical Diagnosis:     S82.892D (ICD-10-CM) - Closed left ankle fracture, with routine healing, subsequent encounter   S82.115D (ICD-10-CM) - Nondisplaced fracture of left tibial spine, subsequent encounter for closed fracture with routine healing         Outcome Measure: LEFS 35% limitation    S: Pt reports to PT following 2 month absence secondary to infected wound on L knee. She reports that she has most limitations c quick movements and lateral movements secondary to knee pain and general stiffness. She reports that her wound size is reducing. O:   Time [id number]     Visit 13 Repeat outcome measure at mid point and end. Pain 0/10                       Modalities            Manual            Stretch      gastroc strap 20x10s holds  TE   Seated HS stretch 20x10s holds Seated c strap  TE         Exercise      BTB 4 directions TE    TE    TE   BTB NR   c B HHA flex TA   c B HHA NR   c B HHA NR   4 inch TA   CNP d/t pain TE    TE   BTB NR   BTB TE    TA    TA    NR   Resisted step x30 B 3 direction YTB in shoe TA   foam NR    NR    TA    Performed today     Marching foam x3 min  NR   Step ups forward/lateral x20 B   TA   Balance board f/b, s/s x30 ea  NR               c WS NR   A:  Tolerated well. Pt is progressing well despite absence secondary to wound. She is noting less stiffness and edema since beginning wound care. She continues to have some stiffness in the knee c ambulation but is able to ambulate with mild/moderate gait deficit s AD. Will continue to progress functional mobility over the next 2-4 weeks to maximize recovery needed for ADL activity.   P: Continue with rehab plan  Diana Chance Saad Beck, PT DPT, PT [id number]    Treatment Charges: Mins Units   Initial Evaluation     Re-Evaluation     Ther Exercise         TE 10 1   Manual Therapy     MT     Ther Activities        TA 10 1   Gait Training          GT     Neuro Re-education NR 20 1   Modalities     Non-Billable Service Time     Other     Total Time/Units 40 3

## 2022-01-05 ENCOUNTER — HOSPITAL ENCOUNTER (OUTPATIENT)
Dept: WOUND CARE | Age: 52
Discharge: HOME | End: 2022-01-05
Payer: MEDICARE

## 2022-01-05 VITALS
TEMPERATURE: 98 F | HEART RATE: 67 BPM | HEIGHT: 66 IN | WEIGHT: 155 LBS | SYSTOLIC BLOOD PRESSURE: 102 MMHG | DIASTOLIC BLOOD PRESSURE: 58 MMHG | BODY MASS INDEX: 24.91 KG/M2 | RESPIRATION RATE: 18 BRPM

## 2022-01-05 DIAGNOSIS — L97.922 TRAUMATIC ULCER OF LEFT LOWER LEG WITH FAT LAYER EXPOSED (HCC): Primary | ICD-10-CM

## 2022-01-05 PROCEDURE — 6370000000 HC RX 637 (ALT 250 FOR IP): Performed by: PODIATRIST

## 2022-01-05 PROCEDURE — 15275 SKIN SUB GRAFT FACE/NK/HF/G: CPT

## 2022-01-05 RX ORDER — LIDOCAINE 40 MG/G
CREAM TOPICAL ONCE
Status: CANCELLED | OUTPATIENT
Start: 2022-01-05 | End: 2022-01-05

## 2022-01-05 RX ORDER — BETAMETHASONE DIPROPIONATE 0.05 %
OINTMENT (GRAM) TOPICAL ONCE
Status: CANCELLED | OUTPATIENT
Start: 2022-01-05 | End: 2022-01-05

## 2022-01-05 RX ORDER — LIDOCAINE 50 MG/G
OINTMENT TOPICAL ONCE
Status: CANCELLED | OUTPATIENT
Start: 2022-01-05 | End: 2022-01-05

## 2022-01-05 RX ORDER — LIDOCAINE HYDROCHLORIDE 40 MG/ML
SOLUTION TOPICAL ONCE
Status: CANCELLED | OUTPATIENT
Start: 2022-01-05 | End: 2022-01-05

## 2022-01-05 RX ORDER — GINSENG 100 MG
CAPSULE ORAL ONCE
Status: CANCELLED | OUTPATIENT
Start: 2022-01-05 | End: 2022-01-05

## 2022-01-05 RX ORDER — LIDOCAINE HYDROCHLORIDE 40 MG/ML
SOLUTION TOPICAL ONCE
Status: COMPLETED | OUTPATIENT
Start: 2022-01-05 | End: 2022-01-05

## 2022-01-05 RX ORDER — CLOBETASOL PROPIONATE 0.5 MG/G
OINTMENT TOPICAL ONCE
Status: CANCELLED | OUTPATIENT
Start: 2022-01-05 | End: 2022-01-05

## 2022-01-05 RX ORDER — LIDOCAINE HYDROCHLORIDE 20 MG/ML
JELLY TOPICAL ONCE
Status: CANCELLED | OUTPATIENT
Start: 2022-01-05 | End: 2022-01-05

## 2022-01-05 RX ORDER — GENTAMICIN SULFATE 1 MG/G
OINTMENT TOPICAL ONCE
Status: CANCELLED | OUTPATIENT
Start: 2022-01-05 | End: 2022-01-05

## 2022-01-05 RX ORDER — BACITRACIN ZINC AND POLYMYXIN B SULFATE 500; 1000 [USP'U]/G; [USP'U]/G
OINTMENT TOPICAL ONCE
Status: CANCELLED | OUTPATIENT
Start: 2022-01-05 | End: 2022-01-05

## 2022-01-05 RX ORDER — BACITRACIN, NEOMYCIN, POLYMYXIN B 400; 3.5; 5 [USP'U]/G; MG/G; [USP'U]/G
OINTMENT TOPICAL ONCE
Status: CANCELLED | OUTPATIENT
Start: 2022-01-05 | End: 2022-01-05

## 2022-01-05 RX ADMIN — LIDOCAINE HYDROCHLORIDE 10 ML: 40 SOLUTION TOPICAL at 08:49

## 2022-01-05 NOTE — PROGRESS NOTES
PuraPly AMTreatment Note    NAME:  Jordy Alfonso  YOB: 1970  MEDICAL RECORD NUMBER:  [id number]  DATE:  1/5/2022    Goal:  Patient will receive safe and proper application of skin substitute. Patient will comply with caring for dressing, and reporting complications. Expiration date checked immediately prior to use. Package intact prior to use and no damage noted. Transport temperature controlled and acceptable. PuraPly AM was removed from protective sterile packaging by provider and applied to prepared ulcer bed. PuraPly AM was hydrated with sterile normal saline per provider. PuraPly AM was applied to left leg ulcer and affixed with steri-strips by the provider. PuraPly AM was covered with non-adherent ulcer dressing. Applied steri strip applied to secure graft over non-adherent. Applied dry gauze and/or roll gauze. Patient/caregiver was instructed not to remove dressing and to keep it clean and dry. Pt/family/caregiver was instructed on signs and symptoms of complications to report such as draining through dressing, dressing falling down/slipping, getting wet, or severe pain or tingling. PuraPly AM may be applied a total of 10 times per wound over a 12 week period. Date of first application of PuraPly for this current wound is December 8, 2021.     Guidelines followed    Electronically signed by Amisha Jacome RN on 1/5/2022 at 9:10 AM

## 2022-01-05 NOTE — PROGRESS NOTES
[facility]  History and Physical/Consultation  Podiatry    Referring Physician : Clif Trivedi MD  [address] RECORD NUMBER:  [id number]  AGE: 46 y.o. GENDER: female  : 1970  EPISODE DATE:  2022  Subjective:     Chief Complaint   Patient presents with    Wound Check     left leg         HISTORY of PRESENT ILLNESS HPI     Yesenia Tracy is a 46 y.o. female who presents today for wound/ulcer evaluation. History of Wound Context:  The patient has had a wound of left leg trauma which was first noted approximately months. This has been treated antibiotic. On their initial visit to the wound healing center, 20 ,  the patient has noted that the wound has not been improving. The patient has had similar previous wounds in the past.      Pt is not on abx at time of initial visit.       Wound/Ulcer Pain Timing/Severity: constant  Quality of pain: aching  Severity:  5 / 10   Modifying Factors: Pain worsens with walking  Associated Signs/Symptoms: edema, erythema and drainage    Ulcer Identification:  Ulcer Type: venous and traumatic  Contributing Factors: edema and venous stasis    Diabetic/Pressure/Non Pressure Ulcers onl y:  Ulcer: Non-Pressure ulcer, fat layer exposed    If patient has diabetic lower extremity wounds  Russell Classification of diabetic lower extremity wounds:    Grade Description   []  0 No open wound   []  1 Superficial ulcer involving the full skin thickness   []  2 Deep ulcer involves ligament, tendon, joint capsule, or fascia  No bone involvement or abscess presence   []  3 Deep Ulcer with abcess formation and/or osteomyelitis   []  4 Localized gangrene   []  5 Extensive gangrene of the foot     Wound: Crush Injury        21  Debrided, puraply  applied    21  Debrided, puraply applied      21  Debrided, puraply applied     22  Debrided, puraply applied 8 cm   PAST MEDICAL HISTORY      Diagnosis Date    Hearing loss     congenital Past Surgical History:   Procedure Laterality Date    FRACTURE SURGERY       Family History   Problem Relation Age of Onset   [facility] Hearing Loss Mother         deaf    Hearing Loss Father         deaf    Heart Disease Father         pacemaker     Social History     Tobacco Use    Smoking status: Former Smoker     Packs/day: 1.00     Years: 20.00     Pack years: 20.00     Types: Cigarettes     Quit date: 1/10/2007     Years since quittin.9    Smokeless tobacco: Never Used   Vaping Use    Vaping Use: Never used   Substance Use Topics    Alcohol use: Yes     Comment: 3 Seagrams per month    Drug use: Yes     Types: Marijuana (Weed)     Comment: daily     No Known Allergies  Current Outpatient Medications on File Prior to Encounter   Medication Sig Dispense Refill    acetaminophen (TYLENOL) 325 MG tablet Take 650 mg by mouth every 6 hours as needed for Pain      LYSINE PO Take by mouth      B Complex-C-E-Zn (STRESS B-COMPLEX/VIT C/ZINC) TABS Take 1 tablet by mouth daily 30 tablet 2    gabapentin (NEURONTIN) 400 MG capsule       calcium carbonate (OSCAL) 500 MG TABS tablet Take 500 mg by mouth daily       Biotin 1 MG CAPS Take by mouth       Multiple Vitamins-Minerals (THERAPEUTIC MULTIVITAMIN-MINERALS) tablet Take 1 tablet by mouth daily        No current facility-administered medications on file prior to encounter.        REVIEW OF SYSTEMS   ROS : All others Negative if blank [], Positive if [x]  General Vascular   [] Fevers [] Claudication   [] Chills [] Rest Pain   Skin Neurologic   [x] Tissue Loss [x] Lower extremity neuropathy       21  Debrided, cont tx and care, levaquin 500mg daily x 7 days  Objective:    BP (!) 102/58   Pulse 67   Temp 98 °F (36.7 °C)   Resp 18   Ht 5' 6\" (1.676 m)   Wt 155 lb (70.3 kg)   BMI 25.02 kg/m²   Wt Readings from Last 3 Encounters:   22 155 lb (70.3 kg)   21 155 lb (70.3 kg)   21 155 lb (70.3 kg)       PHYSICAL EXAM   CONSTITUTIONAL: Undermining Ends___ O'Clock 12 12/17/21 0809   Undermining Maxium Distance (cm) 0.7 @ 11 12/17/21 0809   Wound Assessment Fibrin;Pink/red 01/05/22 0842   Drainage Amount Small 01/05/22 0842   Drainage Description Serosanguinous 01/05/22 0842   Odor None 01/05/22 0842   Hillary-wound Assessment Fragile 01/05/22 0842   Wound Thickness Description not for Pressure Injury Full thickness 12/01/21 0931   Number of days: 35        Skin Substitute Applied:         [] APLIGRAF   []44 sq/cm   []88 sq/cm    []132 sq/cm  []176  sq/cm           [] DERMAGRAFT  [] 38sq/cm   []76 sq/cm    []114 sq/cm  []152 sq/cm         [] OASIS   [] 10.5 sq/cm   []21 sq/cm    []4 sq/cm PuraPly  []8 sq/cm PuraPly        [x] OTHER Puraply 8  [x]        sq/cm       Performed by: Sky July, DPM    Wound Type:venous    Consent obtained: Yes    Time out taken: Yes     Fenestrated: Yes    Instrument(s) curette      [] Mesher Utilized    Skin Substitute was Applied to Wound Number(s):     Wound #: 1      Wound 12/01/21 Leg Left; Medial #1  trauma (Active)   Wound Image   12/01/21 0916   Wound Etiology Traumatic 12/01/21 0916   Dressing Status New dressing applied 12/17/21 0838   Wound Cleansed Cleansed with saline 12/17/21 0838   Dressing/Treatment Other (comment) 12/17/21 0838   Offloading for Diabetic Foot Ulcers No offloading required 12/01/21 0958   Wound Length (cm) 1.7 cm 12/17/21 0809   Wound Width (cm) 1 cm 12/17/21 0809   Wound Depth (cm) 0.3 cm 12/17/21 0809   Wound Surface Area (cm^2) 1.7 cm^2 12/17/21 0809   Change in Wound Size % (l*w) 43.33 12/17/21 0809   Wound Volume (cm^3) 0.51 cm^3 12/17/21 0809   Wound Healing % 15 12/17/21 0809   Post-Procedure Length (cm) 1.7 cm 12/17/21 0817   Post-Procedure Width (cm) 1 cm 12/17/21 0817   Post-Procedure Depth (cm) 0.3 cm 12/17/21 0817   Post-Procedure Surface Area (cm^2) 1.7 cm^2 12/17/21 0817   Post-Procedure Volume (cm^3) 0.51 cm^3 12/17/21 0817   Undermining Starts ___ O'Clock 10 12/17/21 0809   Undermining Ends___ O'Clock 12 12/17/21 0809   Undermining Maxium Distance (cm) 0.7 @ 11 12/17/21 0809   Wound Assessment Fibrin; Pink/red 12/17/21 0809   Drainage Amount Moderate 12/17/21 0809   Drainage Description Serosanguinous 12/17/21 0809   Odor None 12/17/21 0809   Hillary-wound Assessment Edematous; Fragile 12/08/21 0949   Wound Thickness Description not for Pressure Injury Full thickness 12/01/21 0931   Number of days: 15         Total Surface Area Covered 2 sq/cm     Amount Wasted 0 sq/cm    Reason for Waste 0      Secured: Yes    Secured With:  [x]Steri Strips    []Sutures     []Staples []Other    Procedural Pain: 2/10     Post Procedural Pain: 4 / 10    Response to Treatment:  Well tolerated by patient. Procedure Note  Indications:  Based on my examination of this patient's wound(s)/ulcer(s) today, debridement is required to promote healing and evaluate the wound base. Performed by: Anita Sethi DPM    Consent obtained:  Yes    Time out taken:  Yes    Pain Control: Anesthetic  Anesthetic: 4% Lidocaine Liquid Topical     Debridement:Excisional Debridement    Using #15 blade scalpel the wound(s)/ulcer(s) was/were sharply debrided down through and including the removal of subcutaneous tissue. Devitalized Tissue Debrided:  fibrin, biofilm, slough and necrotic/eschar to stimulate bleeding to promote healing, post debridement good bleeding base and wound edges noted    Wound/Ulcer #: 1    Percent of Wound/Ulcer Debrided: 100%    Total Surface Area Debrided:  2 sq cm     Estimated Blood Loss:  Minimal  Hemostasis Achieved:  by pressure    Procedural Pain:  3  / 10   Post Procedural Pain:  4 / 10     Response to treatment:  Well tolerated by patient. A culture was done.       Plan:     Pt is not a smoker   - Discussed relationship of smoking and negative affects on wound healing   - Emphasized importance of tobacco avoidace/cessation   - Script for nicotine patch given to patient   - Information regarding support groups for smoking cessation given    In my professional opinion and based off the information that is available at this time this patient has appropriate indication for HBO Therapy: Maybe    Treatment Note please see attached Discharge Instructions    Written patient dismissal instructions given to patient and signed by patient or POA. Discharge Instructions       Visit Discharge/Physician Orders  Discharge condition: Stable    Assessment of pain at discharge:  NONE    Anesthetic used: 4% lidocaine solution    Discharge to: Home    Left via:Private automobile    Accompanied by: accompanied by self    ECF/HHA:   [facility]    Dressing Orders:  [address] #5 of 10 puraply applied to left leg ulcer and steri stripped in place. Bobby Favre Apply Kerlex and coban for one week    Treatment Orders:      wound care  scheduled ARTERIAL STUDY FOR February 9TH AT 2:15 PM ARRIVAL. [facility] followup visit ___________One week with Dr. Winters__________________  (Please note your next appointment above and if you are unable to keep, kindly give a 24 hour notice. Thank you.)    Physician signature:__________________________      If you experience any of the following, please call the [address] during business hours:    * Increase in Pain  * Temperature over 101  * Increase in drainage from your wound  * Drainage with a foul odor  * Bleeding  * Increase in swelling  * Need for compression bandage changes due to slippage, breakthrough drainage. If you need medical attention outside of the business hours of the [facility] [address] please contact your PCP or go to the nearest emergency room.         Electronically signed by Xander Antoine DPM on 1/5/2022 at 9:15 AM

## 2022-01-11 ENCOUNTER — TREATMENT (OUTPATIENT)
Dept: PHYSICAL THERAPY | Age: 52
End: 2022-01-11
Payer: MEDICARE

## 2022-01-11 DIAGNOSIS — S82.892D CLOSED LEFT ANKLE FRACTURE, WITH ROUTINE HEALING, SUBSEQUENT ENCOUNTER: Primary | ICD-10-CM

## 2022-01-11 PROCEDURE — 97110 THERAPEUTIC EXERCISES: CPT | Performed by: PHYSICAL THERAPIST

## 2022-01-11 PROCEDURE — 97112 NEUROMUSCULAR REEDUCATION: CPT | Performed by: PHYSICAL THERAPIST

## 2022-01-11 NOTE — PROGRESS NOTES
[facility]   Phone: [phone]   Fax: [phone]      Physical Therapy Daily Treatment Note    Date: 2022  Patient Name: Eduardo Child  : 1970   MRN: [id number]  DOInjury:    DOSx:   Referring Provider: David Ocasio DO  [address] [facility] [facility] [place],   [facility]     Medical Diagnosis:     S82.892D (ICD-10-CM) - Closed left ankle fracture, with routine healing, subsequent encounter   S82.115D (ICD-10-CM) - Nondisplaced fracture of left tibial spine, subsequent encounter for closed fracture with routine healing         Outcome Measure: LEFS 35% limitation    S: Pt reports to PT c stiffness in L knee/ankle no reports of pain. O:   Time [id number]     Visit 14 Repeat outcome measure at mid point and end. Pain 0/10                       Modalities            Manual            Stretch      gastroc strap 20x10s holds  TE   Seated knee ext strap 20x10s holds Seated c strap  TE   Supine Knee Flexion 20x 5s holds Supine c strap TE   Exercise                  BTB NR   c B HHA flex TA   c B HHA NR   c B HHA NR   4 inch TA   CNP d/t pain TE    TE   BTB NR   BTB TE    TA    TA    NR   Resisted step x30 B 3 direction YTB in shoe TA   foam NR    NR    TA    Performed today     Foam step ups hikes x30 B  NR   Longsit quad set/knee ext x30 B 3s hold  NR   Bridging  x30 w/ 3 sec  TE               c WS NR   A:  Tolerated well. Progressed program to include knee joint movements and BLE proprioception. Continuing with general strengthening and improvement to ROM. Pt reported stiffness initially with exercise but reported reduced stiffness as session progressed. Prescribed bridging and quad sets for home plan over two week vacation.   P: Continue with rehab plan  Hieu Lam, PT DPT, PT [id number]    Treatment Charges: Mins Units   Initial Evaluation     Re-Evaluation     Ther Exercise         TE 25 2   Manual Therapy     MT     Ther Activities        TA     Gait Training GT     Neuro Re-education NR 15 1   Modalities     Non-Billable Service Time     Other     Total Time/Units 40 3

## 2022-01-12 ENCOUNTER — HOSPITAL ENCOUNTER (OUTPATIENT)
Dept: WOUND CARE | Age: 52
Discharge: HOME | End: 2022-01-12
Payer: MEDICARE

## 2022-01-12 VITALS
SYSTOLIC BLOOD PRESSURE: 118 MMHG | DIASTOLIC BLOOD PRESSURE: 64 MMHG | TEMPERATURE: 97.2 F | RESPIRATION RATE: 16 BRPM | HEART RATE: 64 BPM

## 2022-01-12 DIAGNOSIS — L97.922 TRAUMATIC ULCER OF LEFT LOWER LEG WITH FAT LAYER EXPOSED (HCC): Primary | ICD-10-CM

## 2022-01-12 PROCEDURE — 6370000000 HC RX 637 (ALT 250 FOR IP): Performed by: PODIATRIST

## 2022-01-12 PROCEDURE — 15271 SKIN SUB GRAFT TRNK/ARM/LEG: CPT

## 2022-01-12 RX ORDER — LIDOCAINE 40 MG/G
CREAM TOPICAL ONCE
Status: CANCELLED | OUTPATIENT
Start: 2022-01-12 | End: 2022-01-12

## 2022-01-12 RX ORDER — GENTAMICIN SULFATE 1 MG/G
OINTMENT TOPICAL ONCE
Status: CANCELLED | OUTPATIENT
Start: 2022-01-12 | End: 2022-01-12

## 2022-01-12 RX ORDER — LIDOCAINE HYDROCHLORIDE 40 MG/ML
SOLUTION TOPICAL ONCE
Status: COMPLETED | OUTPATIENT
Start: 2022-01-12 | End: 2022-01-12

## 2022-01-12 RX ORDER — BACITRACIN ZINC AND POLYMYXIN B SULFATE 500; 1000 [USP'U]/G; [USP'U]/G
OINTMENT TOPICAL ONCE
Status: CANCELLED | OUTPATIENT
Start: 2022-01-12 | End: 2022-01-12

## 2022-01-12 RX ORDER — BETAMETHASONE DIPROPIONATE 0.05 %
OINTMENT (GRAM) TOPICAL ONCE
Status: CANCELLED | OUTPATIENT
Start: 2022-01-12 | End: 2022-01-12

## 2022-01-12 RX ORDER — BACITRACIN, NEOMYCIN, POLYMYXIN B 400; 3.5; 5 [USP'U]/G; MG/G; [USP'U]/G
OINTMENT TOPICAL ONCE
Status: CANCELLED | OUTPATIENT
Start: 2022-01-12 | End: 2022-01-12

## 2022-01-12 RX ORDER — LIDOCAINE HYDROCHLORIDE 20 MG/ML
JELLY TOPICAL ONCE
Status: CANCELLED | OUTPATIENT
Start: 2022-01-12 | End: 2022-01-12

## 2022-01-12 RX ORDER — CLOBETASOL PROPIONATE 0.5 MG/G
OINTMENT TOPICAL ONCE
Status: CANCELLED | OUTPATIENT
Start: 2022-01-12 | End: 2022-01-12

## 2022-01-12 RX ORDER — LIDOCAINE 50 MG/G
OINTMENT TOPICAL ONCE
Status: CANCELLED | OUTPATIENT
Start: 2022-01-12 | End: 2022-01-12

## 2022-01-12 RX ORDER — GINSENG 100 MG
CAPSULE ORAL ONCE
Status: CANCELLED | OUTPATIENT
Start: 2022-01-12 | End: 2022-01-12

## 2022-01-12 RX ORDER — LIDOCAINE HYDROCHLORIDE 40 MG/ML
SOLUTION TOPICAL ONCE
Status: CANCELLED | OUTPATIENT
Start: 2022-01-12 | End: 2022-01-12

## 2022-01-12 RX ADMIN — LIDOCAINE HYDROCHLORIDE: 40 SOLUTION TOPICAL at 08:28

## 2022-01-12 NOTE — PROGRESS NOTES
Wound Healing Center  History and Physical/Consultation  Podiatry    Referring Physician : Arturo Beckett MD  [address] RECORD NUMBER:  [id number]  AGE: 46 y.o. GENDER: female  : 1970  EPISODE DATE:  2022  Subjective:     Chief Complaint   Patient presents with    Wound Check         HISTORY of PRESENT ILLNESS HPI     Clara Dudley is a 46 y.o. female who presents today for wound/ulcer evaluation. History of Wound Context:  The patient has had a wound of left leg trauma which was first noted approximately months. This has been treated antibiotic. On their initial visit to the wound healing center, 20 ,  the patient has noted that the wound has not been improving. The patient has had similar previous wounds in the past.      Pt is not on abx at time of initial visit.       Wound/Ulcer Pain Timing/Severity: constant  Quality of pain: aching  Severity:  5 / 10   Modifying Factors: Pain worsens with walking  Associated Signs/Symptoms: edema, erythema and drainage    Ulcer Identification:  Ulcer Type: venous and traumatic  Contributing Factors: edema and venous stasis    Diabetic/Pressure/Non Pressure Ulcers onl y:  Ulcer: Non-Pressure ulcer, fat layer exposed    If patient has diabetic lower extremity wounds  Russell Classification of diabetic lower extremity wounds:    Grade Description   []  0 No open wound   []  1 Superficial ulcer involving the full skin thickness   []  2 Deep ulcer involves ligament, tendon, joint capsule, or fascia  No bone involvement or abscess presence   []  3 Deep Ulcer with abcess formation and/or osteomyelitis   []  4 Localized gangrene   []  5 Extensive gangrene of the foot     Wound: Crush Injury        21  Debrided, puraply  applied    21  Debrided, puraply applied      21  Debrided, puraply applied     22  Debrided, puraply applied 8 cm       2022  · Wound looks , debrided, Nushield 1.6 cm disc applied and secured (70.3 kg)   12/17/21 155 lb (70.3 kg)       PHYSICAL EXAM   CONSTITUTIONAL:   Awake, alert, cooperative   PSYCHIATRIC :  Oriented to time, place and person      normal insight to disease process  EXTREMITIES:   R LE Open wounds are not noted   Skin color is normal   Edema is  noted   Sensation intact to light touch   Palpation of the foot does cause pain   5/5 strength DF/PF  L LE Open wounds are noted   Skin color is abnormal with ulcer   Edema is  noted   Sensation intact to light touch   Palpation of the foot does cause pain   5/5 strength DF/PF  R dorsalis pedis 1 L dorsalis pedis 1   R posterior tibial 1 L posterior tibial 1     Assessment:     Problem List Items Addressed This Visit     Traumatic ulcer of left lower leg with fat layer exposed ([facility] [address] - Primary    Relevant Orders    Initiate Outpatient Wound Care Protocol          Pre Debridement Measurements:  Are located in the [place]  Documentation Flow Sheet  Post Debridement Measurements:  Wound/Ulcer Descriptions are Pre Debridement except measurements:     Wound 12/01/21 Leg Left; Medial #1  trauma (Active)   Wound Image   01/05/22 0842   Wound Etiology Traumatic 12/01/21 0916   Dressing Status New dressing applied 01/12/22 0917   Wound Cleansed Not Cleansed 01/12/22 0917   Dressing/Treatment Dry dressing;Non adherent 01/12/22 0917   Offloading for Diabetic Foot Ulcers No offloading required 01/12/22 0917   Wound Length (cm) 1 cm 01/12/22 0826   Wound Width (cm) 0.6 cm 01/12/22 0826   Wound Depth (cm) 0.2 cm 01/12/22 0826   Wound Surface Area (cm^2) 0.6 cm^2 01/12/22 0826   Change in Wound Size % (l*w) 80 01/12/22 0826   Wound Volume (cm^3) 0.12 cm^3 01/12/22 0826   Wound Healing % 80 01/12/22 0826   Post-Procedure Length (cm) 1 cm 01/12/22 0900   Post-Procedure Width (cm) 0.6 cm 01/12/22 0900   Post-Procedure Depth (cm) 0.3 cm 01/12/22 0900   Post-Procedure Surface Area (cm^2) 0.6 cm^2 01/12/22 0900   Post-Procedure Volume (cm^3) 0.18 cm^3 01/12/22 0900   Undermining Starts ___ O'Clock 10 12/17/21 0809   Undermining Ends___ O'Clock 12 12/17/21 0809   Undermining Maxium Distance (cm) 0.7 @ 11 12/17/21 0809   Wound Assessment Fibrin;Pink/red 01/12/22 0826   Drainage Amount Small 01/12/22 0826   Drainage Description Serosanguinous 01/12/22 0826   Odor None 01/12/22 0826   Hillary-wound Assessment Fragile; Excoriated 01/12/22 0826   Wound Thickness Description not for Pressure Injury Full thickness 12/01/21 0931   Number of days: 42        Skin Substitute Applied:         [] APLIGRAF   []44 sq/cm   []88 sq/cm    []132 sq/cm  []176  sq/cm           [] DERMAGRAFT  [] 38sq/cm   []76 sq/cm    []114 sq/cm  []152 sq/cm         [] OASIS   [] 10.5 sq/cm   []21 sq/cm    []4 sq/cm PuraPly  []8 sq/cm PuraPly        [x] OTHER  Nushield Disc  [x]      1.6  sq/cm   ID number of the lot [id number]      Performed by: Zan Hebert MD    Wound Type:venous/ traumatic    Consent obtained: Yes    Time out taken: Yes     Fenestrated: Yes    Instrument(s) curette      [] Mesher Utilized    Skin Substitute was Applied to Wound Number(s):     Wound #: 1      Wound 12/01/21 Leg Left; Medial #1  trauma (Active)   Wound Image   12/01/21 0916   Wound Etiology Traumatic 12/01/21 0916   Dressing Status New dressing applied 12/17/21 0838   Wound Cleansed Cleansed with saline 12/17/21 0838   Dressing/Treatment Other (comment) 12/17/21 0838   Offloading for Diabetic Foot Ulcers No offloading required 12/01/21 0958   Wound Length (cm) 1.7 cm 12/17/21 0809   Wound Width (cm) 1 cm 12/17/21 0809   Wound Depth (cm) 0.3 cm 12/17/21 0809   Wound Surface Area (cm^2) 1.7 cm^2 12/17/21 0809   Change in Wound Size % (l*w) 43.33 12/17/21 0809   Wound Volume (cm^3) 0.51 cm^3 12/17/21 0809   Wound Healing % 15 12/17/21 0809   Post-Procedure Length (cm) 1.7 cm 12/17/21 0817   Post-Procedure Width (cm) 1 cm 12/17/21 0817   Post-Procedure Depth (cm) 0.3 cm 12/17/21 0817   Post-Procedure Surface Area (cm^2) 1.7 cm^2 12/17/21 0817   Post-Procedure Volume (cm^3) 0.51 cm^3 12/17/21 0817   Undermining Starts ___ O'Clock 10 12/17/21 0809   Undermining Ends___ O'Clock 12 12/17/21 0809   Undermining Maxium Distance (cm) 0.7 @ 11 12/17/21 0809   Wound Assessment Fibrin; Pink/red 12/17/21 0809   Drainage Amount Moderate 12/17/21 0809   Drainage Description Serosanguinous 12/17/21 0809   Odor None 12/17/21 0809   Hillary-wound Assessment Edematous; Fragile 12/08/21 0949   Wound Thickness Description not for Pressure Injury Full thickness 12/01/21 0931   Number of days: 15         Total Surface Area Covered 2 sq/cm     Amount Wasted 0 sq/cm    Reason for Waste 0      Secured: Yes    Secured With:  [x]Steri Strips    []Sutures     []Staples []Other    Procedural Pain: 2/10     Post Procedural Pain: 4 / 10    Response to Treatment:  Well tolerated by patient. Procedure Note  Indications:  Based on my examination of this patient's wound(s)/ulcer(s) today, debridement is required to promote healing and evaluate the wound base. Performed by: Jack Gordon MD    Consent obtained:  Yes    Time out taken:  Yes    Pain Control: Anesthetic  Anesthetic: 4% Lidocaine Liquid Topical     Debridement:Excisional Debridement    Using #15 blade scalpel the wound(s)/ulcer(s) was/were sharply debrided down through and including the removal of subcutaneous tissue. Devitalized Tissue Debrided:  fibrin, biofilm, slough and necrotic/eschar to stimulate bleeding to promote healing, post debridement good bleeding base and wound edges noted    Wound/Ulcer #: 1    Percent of Wound/Ulcer Debrided: 100%    Total Surface Area Debrided:  2 sq cm     Estimated Blood Loss:  Minimal  Hemostasis Achieved:  by pressure    Procedural Pain:  3  / 10   Post Procedural Pain:  4 / 10     Response to treatment:  Well tolerated by patient. A culture was done.       Plan:     Pt is not a smoker   - Discussed relationship of smoking and negative affects on wound healing   - Emphasized importance of tobacco avoidace/cessation   - Script for nicotine patch given to patient   - Information regarding support groups for smoking cessation given    In my professional opinion and based off the information that is available at this time this patient has appropriate indication for HBO Therapy: Maybe    Treatment Note please see attached Discharge Instructions    Written patient dismissal instructions given to patient and signed by patient or POA. Discharge Instructions       Visit Discharge/Physician Orders    Discharge condition: Stable    Assessment of pain at discharge:  NONE    Anesthetic used: 4% lidocaine solution    Discharge to: Home    Left via:Private automobile    Accompanied by: accompanied by self    ECF/HHA:   [facility]    Dressing Orders:  [address] #[address] of (1 of 4) nushield 1,6 sq cm disc applied to left leg ulcer and steri stripped in place. Apply adaptic over graft. Rim top of calf with 2 ABD to keep coban from cutting into leg. Apply Kerlex and coban for one week    Treatment Orders:      wound care  scheduled ARTERIAL STUDY FOR February 9TH AT 2:15 PM ARRIVAL. [facility] followup visit ___________One week with Dr. Winters__________________  (Please note your next appointment above and if you are unable to keep, kindly give a 24 hour notice. Thank you.)    Physician signature:__________________________      If you experience any of the following, please call the [facility] during business hours:    * Increase in Pain  * Temperature over 101  * Increase in drainage from your wound  * Drainage with a foul odor  * Bleeding  * Increase in swelling  * Need for compression bandage changes due to slippage, breakthrough drainage. If you need medical attention outside of the business hours of the [facility] please contact your PCP or go to the nearest emergency room.         Electronically signed by Annamarie Holden MD on 1/12/2022 at 9:22 AM

## 2022-01-12 NOTE — PROGRESS NOTES
NuShield Treatment Note    NAME:  Su Mitchell  YOB: 1970  MEDICAL RECORD NUMBER:  [id number]  DATE:  1/12/2022    Goal:  Patient will receive safe and proper application of skin substitute. Patient will comply with caring for dressing, offloading and reporting complications. Expiration date checked immediately prior to use. Package intact prior to use and no damage noted. Nushield was removed from protective sterile packaging by provider and applied to prepared ulcer bed. NuShield applied with notch to Top Upper Right Corner. NuShield was hydrated with sterile normal saline per provider. NuShield was applied to left leg ulcer and affixed with steri-strips by the provider. Applied nonadherent and steri strip applied to secure graft (Secondary Dressing)   Coban or ace wrap was applied to secure graft and decrease edema. Patient/caregiver was instructed not to remove dressing and to keep it clean and dry. NuShield may be applied a total of 10 times per wound over a 12 week period. Additionally NuShield may only be used every 12 months per wound. Date of first application of NuShield for this current wound is January 12, 2022.       Application # 1 out of 5           Guidelines followed    Electronically signed by Araceli Olson RN on 1/12/2022 at 9:24 AM

## 2022-01-19 ENCOUNTER — HOSPITAL ENCOUNTER (OUTPATIENT)
Dept: WOUND CARE | Age: 52
Discharge: HOME | End: 2022-01-19
Payer: MEDICARE

## 2022-01-19 VITALS
HEART RATE: 88 BPM | BODY MASS INDEX: 24.91 KG/M2 | RESPIRATION RATE: 16 BRPM | SYSTOLIC BLOOD PRESSURE: 132 MMHG | DIASTOLIC BLOOD PRESSURE: 68 MMHG | WEIGHT: 155 LBS | TEMPERATURE: 96.8 F | HEIGHT: 66 IN

## 2022-01-19 DIAGNOSIS — L97.922 TRAUMATIC ULCER OF LEFT LOWER LEG WITH FAT LAYER EXPOSED (HCC): Primary | ICD-10-CM

## 2022-01-19 PROCEDURE — 6370000000 HC RX 637 (ALT 250 FOR IP): Performed by: PODIATRIST

## 2022-01-19 PROCEDURE — 15271 SKIN SUB GRAFT TRNK/ARM/LEG: CPT

## 2022-01-19 RX ORDER — LIDOCAINE HYDROCHLORIDE 40 MG/ML
SOLUTION TOPICAL ONCE
Status: CANCELLED | OUTPATIENT
Start: 2022-01-19 | End: 2022-01-19

## 2022-01-19 RX ORDER — GINSENG 100 MG
CAPSULE ORAL ONCE
Status: CANCELLED | OUTPATIENT
Start: 2022-01-19 | End: 2022-01-19

## 2022-01-19 RX ORDER — GENTAMICIN SULFATE 1 MG/G
OINTMENT TOPICAL ONCE
Status: CANCELLED | OUTPATIENT
Start: 2022-01-19 | End: 2022-01-19

## 2022-01-19 RX ORDER — BACITRACIN, NEOMYCIN, POLYMYXIN B 400; 3.5; 5 [USP'U]/G; MG/G; [USP'U]/G
OINTMENT TOPICAL ONCE
Status: CANCELLED | OUTPATIENT
Start: 2022-01-19 | End: 2022-01-19

## 2022-01-19 RX ORDER — LIDOCAINE HYDROCHLORIDE 40 MG/ML
SOLUTION TOPICAL ONCE
Status: COMPLETED | OUTPATIENT
Start: 2022-01-19 | End: 2022-01-19

## 2022-01-19 RX ORDER — LIDOCAINE 50 MG/G
OINTMENT TOPICAL ONCE
Status: CANCELLED | OUTPATIENT
Start: 2022-01-19 | End: 2022-01-19

## 2022-01-19 RX ORDER — BACITRACIN ZINC AND POLYMYXIN B SULFATE 500; 1000 [USP'U]/G; [USP'U]/G
OINTMENT TOPICAL ONCE
Status: CANCELLED | OUTPATIENT
Start: 2022-01-19 | End: 2022-01-19

## 2022-01-19 RX ORDER — LIDOCAINE HYDROCHLORIDE 20 MG/ML
JELLY TOPICAL ONCE
Status: CANCELLED | OUTPATIENT
Start: 2022-01-19 | End: 2022-01-19

## 2022-01-19 RX ORDER — LIDOCAINE 40 MG/G
CREAM TOPICAL ONCE
Status: CANCELLED | OUTPATIENT
Start: 2022-01-19 | End: 2022-01-19

## 2022-01-19 RX ORDER — BETAMETHASONE DIPROPIONATE 0.05 %
OINTMENT (GRAM) TOPICAL ONCE
Status: CANCELLED | OUTPATIENT
Start: 2022-01-19 | End: 2022-01-19

## 2022-01-19 RX ORDER — CLOBETASOL PROPIONATE 0.5 MG/G
OINTMENT TOPICAL ONCE
Status: CANCELLED | OUTPATIENT
Start: 2022-01-19 | End: 2022-01-19

## 2022-01-19 RX ADMIN — LIDOCAINE HYDROCHLORIDE 10 ML: 40 SOLUTION TOPICAL at 08:28

## 2022-01-19 NOTE — PROGRESS NOTES
[facility]  History and Physical/Consultation  Podiatry    Referring Physician : Leilani Menjivar MD  [address] RECORD NUMBER:  [id number]  AGE: 46 y.o. GENDER: female  : 1970  EPISODE DATE:  2022  Subjective:     Chief Complaint   Patient presents with    Wound Check     left leg         HISTORY of PRESENT ILLNESS HPI     Jin Garces is a 46 y.o. female who presents today for wound/ulcer evaluation. History of Wound Context:  The patient has had a wound of left leg trauma which was first noted approximately months. This has been treated antibiotic. On their initial visit to the wound healing center, 20 ,  the patient has noted that the wound has not been improving. The patient has had similar previous wounds in the past.      Pt is not on abx at time of initial visit.       Wound/Ulcer Pain Timing/Severity: constant  Quality of pain: aching  Severity:  5 / 10   Modifying Factors: Pain worsens with walking  Associated Signs/Symptoms: edema, erythema and drainage    Ulcer Identification:  Ulcer Type: venous and traumatic  Contributing Factors: edema and venous stasis    Diabetic/Pressure/Non Pressure Ulcers onl y:  Ulcer: Non-Pressure ulcer, fat layer exposed    If patient has diabetic lower extremity wounds  Russell Classification of diabetic lower extremity wounds:    Grade Description   []  0 No open wound   []  1 Superficial ulcer involving the full skin thickness   []  2 Deep ulcer involves ligament, tendon, joint capsule, or fascia  No bone involvement or abscess presence   []  3 Deep Ulcer with abcess formation and/or osteomyelitis   []  4 Localized gangrene   []  5 Extensive gangrene of the foot     Wound: Crush Injury        21  Debrided, puraply  applied    21  Debrided, puraply applied      21  Debrided, puraply applied     22  Debrided, puraply applied 8 cm     22  Nushield applied, debrided  PAST MEDICAL HISTORY      Diagnosis Date    Hearing loss     congenital     Past Surgical History:   Procedure Laterality Date    FRACTURE SURGERY       Family History   Problem Relation Age of Onset   Uriel Outhouse Hearing Loss Mother         deaf    Hearing Loss Father         deaf    Heart Disease Father         pacemaker     Social History     Tobacco Use    Smoking status: Former Smoker     Packs/day: 1.00     Years: 20.00     Pack years: 20.00     Types: Cigarettes     Quit date: 1/10/2007     Years since quitting: 15.0    Smokeless tobacco: Never Used   Vaping Use    Vaping Use: Never used   Substance Use Topics    Alcohol use: Yes     Comment: 3 Seagrams per month    Drug use: Yes     Types: Marijuana (Weed)     Comment: daily     No Known Allergies  Current Outpatient Medications on File Prior to Encounter   Medication Sig Dispense Refill    acetaminophen (TYLENOL) 325 MG tablet Take 650 mg by mouth every 6 hours as needed for Pain      LYSINE PO Take by mouth      B Complex-C-E-Zn (STRESS B-COMPLEX/VIT C/ZINC) TABS Take 1 tablet by mouth daily 30 tablet 2    gabapentin (NEURONTIN) 400 MG capsule       calcium carbonate (OSCAL) 500 MG TABS tablet Take 500 mg by mouth daily       Biotin 1 MG CAPS Take by mouth       Multiple Vitamins-Minerals (THERAPEUTIC MULTIVITAMIN-MINERALS) tablet Take 1 tablet by mouth daily        No current facility-administered medications on file prior to encounter.        REVIEW OF SYSTEMS   ROS : All others Negative if blank [], Positive if [x]  General Vascular   [] Fevers [] Claudication   [] Chills [] Rest Pain   Skin Neurologic   [x] Tissue Loss [x] Lower extremity neuropathy       12-8-21  Debrided, cont tx and care, levaquin 500mg daily x 7 days  Objective:    /68   Pulse 88   Temp 96.8 °F (36 °C) (Temporal)   Resp 16   Ht 5' 6\" (1.676 m)   Wt 155 lb (70.3 kg)   BMI 25.02 kg/m²   Wt Readings from Last 3 Encounters:   01/19/22 155 lb (70.3 kg)   01/05/22 155 lb (70.3 kg)   12/22/21 155 lb (70.3 kg)       PHYSICAL EXAM   CONSTITUTIONAL:   Awake, alert, cooperative   PSYCHIATRIC :  Oriented to time, place and person      normal insight to disease process  EXTREMITIES:   R LE Open wounds are not noted   Skin color is normal   Edema is  noted   Sensation intact to light touch   Palpation of the foot does cause pain   5/5 strength DF/PF  L LE Open wounds are noted   Skin color is abnormal with ulcer   Edema is  noted   Sensation intact to light touch   Palpation of the foot does cause pain   5/5 strength DF/PF  R dorsalis pedis 1 L dorsalis pedis 1   R posterior tibial 1 L posterior tibial 1     Assessment:     Problem List Items Addressed This Visit     Traumatic ulcer of left lower leg with fat layer exposed ([facility] [address] - Primary    Relevant Orders    Initiate Outpatient Wound Care Protocol          Pre Debridement Measurements:  Are located in the [facility]  Documentation Flow Sheet  Post Debridement Measurements:  Wound/Ulcer Descriptions are Pre Debridement except measurements:     Wound 12/01/21 Leg Left; Medial #1  trauma (Active)   Wound Image   01/05/22 0842   Wound Etiology Traumatic 12/01/21 0916   Dressing Status New dressing applied 01/12/22 0917   Wound Cleansed Not Cleansed 01/12/22 0917   Dressing/Treatment Dry dressing;Non adherent 01/12/22 0917   Offloading for Diabetic Foot Ulcers No offloading required 01/12/22 0917   Wound Length (cm) 0.9 cm 01/19/22 0824   Wound Width (cm) 0.4 cm 01/19/22 0824   Wound Depth (cm) 0.2 cm 01/19/22 0824   Wound Surface Area (cm^2) 0.36 cm^2 01/19/22 0824   Change in Wound Size % (l*w) 88 01/19/22 0824   Wound Volume (cm^3) 0.072 cm^3 01/19/22 0824   Wound Healing % 88 01/19/22 0824   Post-Procedure Length (cm) 1 cm 01/12/22 0900   Post-Procedure Width (cm) 0.6 cm 01/12/22 0900   Post-Procedure Depth (cm) 0.3 cm 01/12/22 0900   Post-Procedure Surface Area (cm^2) 0.6 cm^2 01/12/22 0900   Post-Procedure Volume (cm^3) 0.18 cm^3 01/12/22 0900   Undermining Starts ___ O'Clock 10 12/17/21 0809   Undermining Ends___ O'Clock 12 12/17/21 0809   Undermining Maxium Distance (cm) 0.7 @ 11 12/17/21 0809   Wound Assessment Fibrin;Pink/red 01/19/22 0824   Drainage Amount Scant 01/19/22 0824   Drainage Description Serous 01/19/22 0824   Odor None 01/19/22 0824   Hillary-wound Assessment Intact 01/19/22 0824   Wound Thickness Description not for Pressure Injury Full thickness 12/01/21 0931   Number of days: 48        Skin Substitute Applied:         [] APLIGRAF   []44 sq/cm   []88 sq/cm    []132 sq/cm  []176  sq/cm           [] DERMAGRAFT  [] 38sq/cm   []76 sq/cm    []114 sq/cm  []152 sq/cm         [] OASIS   [] 10.5 sq/cm   []21 sq/cm    []4 sq/cm PuraPly  []8 sq/cm PuraPly        [x] OTHER Nushield 2  [x]        sq/cm       Performed by: Kingsley Story DPM    Wound Type:venous    Consent obtained: Yes    Time out taken: Yes     Fenestrated: Yes    Instrument(s) curette      [] Mesher Utilized    Skin Substitute was Applied to Wound Number(s):     Wound #: 1      Wound 12/01/21 Leg Left; Medial #1  trauma (Active)   Wound Image   12/01/21 0916   Wound Etiology Traumatic 12/01/21 0916   Dressing Status New dressing applied 12/17/21 0838   Wound Cleansed Cleansed with saline 12/17/21 0838   Dressing/Treatment Other (comment) 12/17/21 0838   Offloading for Diabetic Foot Ulcers No offloading required 12/01/21 0958   Wound Length (cm) 1.7 cm 12/17/21 0809   Wound Width (cm) 1 cm 12/17/21 0809   Wound Depth (cm) 0.3 cm 12/17/21 0809   Wound Surface Area (cm^2) 1.7 cm^2 12/17/21 0809   Change in Wound Size % (l*w) 43.33 12/17/21 0809   Wound Volume (cm^3) 0.51 cm^3 12/17/21 0809   Wound Healing % 15 12/17/21 0809   Post-Procedure Length (cm) 1.7 cm 12/17/21 0817   Post-Procedure Width (cm) 1 cm 12/17/21 0817   Post-Procedure Depth (cm) 0.3 cm 12/17/21 0817   Post-Procedure Surface Area (cm^2) 1.7 cm^2 12/17/21 0817   Post-Procedure Volume (cm^3) 0.51 cm^3 12/17/21 0817   Undermining Starts ___ O'Clock 10 12/17/21 0809   Undermining Ends___ O'Clock 12 12/17/21 0809   Undermining Maxium Distance (cm) 0.7 @ 11 12/17/21 0809   Wound Assessment Fibrin; Pink/red 12/17/21 0809   Drainage Amount Moderate 12/17/21 0809   Drainage Description Serosanguinous 12/17/21 0809   Odor None 12/17/21 0809   Hillary-wound Assessment Edematous; Fragile 12/08/21 0949   Wound Thickness Description not for Pressure Injury Full thickness 12/01/21 0931   Number of days: 15         Total Surface Area Covered 2 sq/cm     Amount Wasted 0 sq/cm    Reason for Waste 0      Secured: Yes    Secured With:  [x]Steri Strips    []Sutures     []Staples []Other    Procedural Pain: 2/10     Post Procedural Pain: 4 / 10    Response to Treatment:  Well tolerated by patient. Procedure Note  Indications:  Based on my examination of this patient's wound(s)/ulcer(s) today, debridement is required to promote healing and evaluate the wound base. Performed by: Sky Das DPM    Consent obtained:  Yes    Time out taken:  Yes    Pain Control: Anesthetic  Anesthetic: 4% Lidocaine Liquid Topical     Debridement:Excisional Debridement    Using #15 blade scalpel the wound(s)/ulcer(s) was/were sharply debrided down through and including the removal of subcutaneous tissue. Devitalized Tissue Debrided:  fibrin, biofilm, slough and necrotic/eschar to stimulate bleeding to promote healing, post debridement good bleeding base and wound edges noted    Wound/Ulcer #: 1    Percent of Wound/Ulcer Debrided: 100%    Total Surface Area Debrided:  2 sq cm     Estimated Blood Loss:  Minimal  Hemostasis Achieved:  by pressure    Procedural Pain:  3  / 10   Post Procedural Pain:  4 / 10     Response to treatment:  Well tolerated by patient. A culture was done.       Plan:     Pt is not a smoker   - Discussed relationship of smoking and negative affects on wound healing   - Emphasized importance of tobacco avoidace/cessation   - Script for nicotine patch given to patient   - Information regarding support groups for smoking cessation given    In my professional opinion and based off the information that is available at this time this patient has appropriate indication for HBO Therapy: Maybe    Treatment Note please see attached Discharge Instructions    Written patient dismissal instructions given to patient and signed by patient or POA. Discharge Instructions       Visit Discharge/Physician Orders    Discharge condition: Stable    Assessment of pain at discharge:  NONE    Anesthetic used: 4% lidocaine solution    Discharge to: Home    Left via:Private automobile    Accompanied by: accompanied by self    ECF/HHA:   [facility]    Dressing Orders:  [address] #[address] of (7 of 10 total grafts) Puraply applied to left leg ulcer and steri stripped in place. Apply adaptic over graft. Rim top of calf with 2 ABD to keep coban from cutting into leg. Apply Kerlex and coban for one week    Treatment Orders:      wound care  scheduled ARTERIAL STUDY FOR February 9TH AT 2:15 PM ARRIVAL. [facility] followup visit ___________One week with Dr. Winters__________________  (Please note your next appointment above and if you are unable to keep, kindly give a 24 hour notice. Thank you.)    Physician signature:__________________________      If you experience any of the following, please call the [address] [place] [address] [facility] during business hours:    * Increase in Pain  * Temperature over 101  * Increase in drainage from your wound  * Drainage with a foul odor  * Bleeding  * Increase in swelling  * Need for compression bandage changes due to slippage, breakthrough drainage. If you need medical attention outside of the business hours of the [address] [place] [address] [facility] please contact your PCP or go to the nearest emergency room.         Electronically signed by George Chavarria DPM on 1/19/2022 at 8:48 AM

## 2022-01-19 NOTE — PROGRESS NOTES
NuShield Treatment Note    NAME:  Mamta Lambert  YOB: 1970  MEDICAL RECORD NUMBER:  [id number]  DATE:  1/19/2022    Goal:  Patient will receive safe and proper application of skin substitute. Patient will comply with caring for dressing, offloading and reporting complications. Expiration date checked immediately prior to use. Package intact prior to use and no damage noted. Nushield was removed from protective sterile packaging by provider and applied to prepared ulcer bed. NuShield applied with notch to Top Upper Right Corner. NuShield was hydrated with sterile normal saline per provider. NuShield was applied to left leg ulcer and affixed with steri-strips by the provider. Applied nonadherent and steristrip applied to secure graft (Secondary Dressing)   Coban or ace wrap was applied to secure graft and decrease edema. Patient/caregiver was instructed not to remove dressing and to keep it clean and dry. NuShield may be applied a total of 10 times per wound over a 12 week period. Additionally NuShield may only be used every 12 months per wound. Date of first application of NuShield for this current wound is January 12, 2022.       Application # 7 out of 10           Guidelines followed    Electronically signed by Endy Edmond RN on 1/19/2022 at 9:52 AM

## 2022-01-25 ENCOUNTER — TREATMENT (OUTPATIENT)
Dept: PHYSICAL THERAPY | Age: 52
End: 2022-01-25
Payer: MEDICARE

## 2022-01-25 DIAGNOSIS — S82.892D CLOSED LEFT ANKLE FRACTURE, WITH ROUTINE HEALING, SUBSEQUENT ENCOUNTER: Primary | ICD-10-CM

## 2022-01-25 PROCEDURE — 97112 NEUROMUSCULAR REEDUCATION: CPT | Performed by: PHYSICAL THERAPIST

## 2022-01-25 PROCEDURE — 97530 THERAPEUTIC ACTIVITIES: CPT | Performed by: PHYSICAL THERAPIST

## 2022-01-25 NOTE — PROGRESS NOTES
[address] [facility]   Phone: [phone]   Fax: [phone]      Physical Therapy Daily Treatment Note    Date: 2022  Patient Name: Brennen Delacruz  : 1970   MRN: [id number]  DOInjury:    DOSx:   Referring Provider: Chalo Moulton, DO  [address] [place] [address]. [place],   [facility]     Medical Diagnosis:     S82.892D (ICD-10-CM) - Closed left ankle fracture, with routine healing, subsequent encounter   S82.115D (ICD-10-CM) - Nondisplaced fracture of left tibial spine, subsequent encounter for closed fracture with routine healing         Outcome Measure: LEFS 35% limitation    S: Pt reports to PT c stiffness in L knee/ankle no reports of pain. O:   Time [id number]     Visit 15 Repeat outcome measure at mid point and end. Pain 0/10                       Modalities            Manual            Stretch      gastroc strap 30x10s holds  TE   Exercise                  TKE & resisted flexion x30  NR   c B HHA flex TA   c B HHA NR   c B HHA NR   4 inch TA   CNP d/t pain TE    TE   BTB NR   BTB TE    TA    TA    NR   Resisted walk x30 forward and lateral    TA   foam NR    NR    TA         Foam step ups hikes x30 B  NR   Balance board x30 A/P and M/L  NR   Quad set c heel prop x30 w/ 10 sec 5lbs on NR   Resisted alphabet  3x  NR         c WS NR   A:  Tolerated well. Progressed program to include knee joint strengthening and BLE proprioception. Pt progressed c functional mobility and stability activity to improve gait mechanics and stability c ADL activity. Pt reported \"grinding feeling\" with exercise but reported reduced stiffness as session progressed.    P: Continue with rehab plan  Humera Duncan, PT DPT, PT [id number]    Treatment Charges: Mins Units   Initial Evaluation     Re-Evaluation     Ther Exercise         TE 5 0   Manual Therapy     MT     Ther Activities        TA 10 1   Gait Training          GT     Neuro Re-education NR 30 2   Modalities     Non-Billable Service Time Other     Total Time/Units 45 3

## 2022-01-26 ENCOUNTER — HOSPITAL ENCOUNTER (OUTPATIENT)
Dept: WOUND CARE | Age: 52
Discharge: HOME | End: 2022-01-26
Payer: MEDICARE

## 2022-01-26 VITALS
HEART RATE: 78 BPM | DIASTOLIC BLOOD PRESSURE: 80 MMHG | SYSTOLIC BLOOD PRESSURE: 120 MMHG | TEMPERATURE: 96.6 F | RESPIRATION RATE: 16 BRPM

## 2022-01-26 DIAGNOSIS — L97.922 TRAUMATIC ULCER OF LEFT LOWER LEG WITH FAT LAYER EXPOSED (HCC): Primary | ICD-10-CM

## 2022-01-26 PROCEDURE — 15271 SKIN SUB GRAFT TRNK/ARM/LEG: CPT

## 2022-01-26 PROCEDURE — 6370000000 HC RX 637 (ALT 250 FOR IP): Performed by: PODIATRIST

## 2022-01-26 RX ORDER — BACITRACIN, NEOMYCIN, POLYMYXIN B 400; 3.5; 5 [USP'U]/G; MG/G; [USP'U]/G
OINTMENT TOPICAL ONCE
Status: CANCELLED | OUTPATIENT
Start: 2022-01-26 | End: 2022-01-26

## 2022-01-26 RX ORDER — GINSENG 100 MG
CAPSULE ORAL ONCE
Status: CANCELLED | OUTPATIENT
Start: 2022-01-26 | End: 2022-01-26

## 2022-01-26 RX ORDER — LIDOCAINE 50 MG/G
OINTMENT TOPICAL ONCE
Status: CANCELLED | OUTPATIENT
Start: 2022-01-26 | End: 2022-01-26

## 2022-01-26 RX ORDER — GENTAMICIN SULFATE 1 MG/G
OINTMENT TOPICAL ONCE
Status: CANCELLED | OUTPATIENT
Start: 2022-01-26 | End: 2022-01-26

## 2022-01-26 RX ORDER — BACITRACIN ZINC AND POLYMYXIN B SULFATE 500; 1000 [USP'U]/G; [USP'U]/G
OINTMENT TOPICAL ONCE
Status: CANCELLED | OUTPATIENT
Start: 2022-01-26 | End: 2022-01-26

## 2022-01-26 RX ORDER — LIDOCAINE HYDROCHLORIDE 40 MG/ML
SOLUTION TOPICAL ONCE
Status: COMPLETED | OUTPATIENT
Start: 2022-01-26 | End: 2022-01-26

## 2022-01-26 RX ORDER — BETAMETHASONE DIPROPIONATE 0.05 %
OINTMENT (GRAM) TOPICAL ONCE
Status: CANCELLED | OUTPATIENT
Start: 2022-01-26 | End: 2022-01-26

## 2022-01-26 RX ORDER — CLOBETASOL PROPIONATE 0.5 MG/G
OINTMENT TOPICAL ONCE
Status: CANCELLED | OUTPATIENT
Start: 2022-01-26 | End: 2022-01-26

## 2022-01-26 RX ORDER — LIDOCAINE HYDROCHLORIDE 20 MG/ML
JELLY TOPICAL ONCE
Status: CANCELLED | OUTPATIENT
Start: 2022-01-26 | End: 2022-01-26

## 2022-01-26 RX ORDER — LIDOCAINE HYDROCHLORIDE 40 MG/ML
SOLUTION TOPICAL ONCE
Status: CANCELLED | OUTPATIENT
Start: 2022-01-26 | End: 2022-01-26

## 2022-01-26 RX ORDER — LIDOCAINE 40 MG/G
CREAM TOPICAL ONCE
Status: CANCELLED | OUTPATIENT
Start: 2022-01-26 | End: 2022-01-26

## 2022-01-26 RX ADMIN — LIDOCAINE HYDROCHLORIDE 4 ML: 40 SOLUTION TOPICAL at 09:09

## 2022-01-26 NOTE — PROGRESS NOTES
Wound Healing Center  History and Physical/Consultation  Podiatry    Referring Physician : Surendra Pope MD  [facility] [address] RECORD NUMBER:  [id number]  AGE: 46 y.o. GENDER: female  : 1970  EPISODE DATE:  2022  Subjective:     Chief Complaint   Patient presents with    Wound Check     wound right leg         HISTORY of PRESENT ILLNESS HPI     Brent Wang is a 46 y.o. female who presents today for wound/ulcer evaluation. History of Wound Context:  The patient has had a wound of left leg trauma which was first noted approximately months. This has been treated antibiotic. On their initial visit to the wound healing center, 20 ,  the patient has noted that the wound has not been improving. The patient has had similar previous wounds in the past.      Pt is not on abx at time of initial visit.       Wound/Ulcer Pain Timing/Severity: constant  Quality of pain: aching  Severity:  5 / 10   Modifying Factors: Pain worsens with walking  Associated Signs/Symptoms: edema, erythema and drainage    Ulcer Identification:  Ulcer Type: venous and traumatic  Contributing Factors: edema and venous stasis    Diabetic/Pressure/Non Pressure Ulcers onl y:  Ulcer: Non-Pressure ulcer, fat layer exposed    If patient has diabetic lower extremity wounds  Russell Classification of diabetic lower extremity wounds:    Grade Description   []  0 No open wound   []  1 Superficial ulcer involving the full skin thickness   []  2 Deep ulcer involves ligament, tendon, joint capsule, or fascia  No bone involvement or abscess presence   []  3 Deep Ulcer with abcess formation and/or osteomyelitis   []  4 Localized gangrene   []  5 Extensive gangrene of the foot     Wound: Crush Injury        21  Debrided, puraply  applied    21  Debrided, puraply applied      21  Debrided, puraply applied     22  Debrided, puraply applied 8 cm     22  Nushield applied, debrided    22  Debrided, contx tx and care, [facility]   PAST MEDICAL HISTORY      Diagnosis Date    Hearing loss     congenital     Past Surgical History:   Procedure Laterality Date    FRACTURE SURGERY       Family History   Problem Relation Age of Onset   Luly Mckay Hearing Loss Mother         deaf    Hearing Loss Father         deaf    Heart Disease Father         pacemaker     Social History     Tobacco Use    Smoking status: Former Smoker     Packs/day: 1.00     Years: 20.00     Pack years: 20.00     Types: Cigarettes     Quit date: 1/10/2007     Years since quitting: 15.0    Smokeless tobacco: Never Used   Vaping Use    Vaping Use: Never used   Substance Use Topics    Alcohol use: Yes     Comment: 3 Seagrams per month    Drug use: Yes     Types: Marijuana (Weed)     Comment: daily     No Known Allergies  Current Outpatient Medications on File Prior to Encounter   Medication Sig Dispense Refill    acetaminophen (TYLENOL) 325 MG tablet Take 650 mg by mouth every 6 hours as needed for Pain      LYSINE PO Take by mouth      B Complex-C-E-Zn (STRESS B-COMPLEX/VIT C/ZINC) TABS Take 1 tablet by mouth daily 30 tablet 2    gabapentin (NEURONTIN) 400 MG capsule       calcium carbonate (OSCAL) 500 MG TABS tablet Take 500 mg by mouth daily       Biotin 1 MG CAPS Take by mouth       Multiple Vitamins-Minerals (THERAPEUTIC MULTIVITAMIN-MINERALS) tablet Take 1 tablet by mouth daily        No current facility-administered medications on file prior to encounter.        REVIEW OF SYSTEMS   ROS : All others Negative if blank [], Positive if [x]  General Vascular   [] Fevers [] Claudication   [] Chills [] Rest Pain   Skin Neurologic   [x] Tissue Loss [x] Lower extremity neuropathy       12-8-21  Debrided, cont tx and care, levaquin 500mg daily x 7 days  Objective:    /80   Pulse 78   Temp 96.6 °F (35.9 °C) (Temporal)   Resp 16   Wt Readings from Last 3 Encounters:   01/19/22 155 lb (70.3 kg)   01/05/22 155 lb (70.3 kg)   12/22/21 155 lb (70.3 kg)       PHYSICAL EXAM   CONSTITUTIONAL:   Awake, alert, cooperative   PSYCHIATRIC :  Oriented to time, place and person      normal insight to disease process  EXTREMITIES:   R LE Open wounds are not noted   Skin color is normal   Edema is  noted   Sensation intact to light touch   Palpation of the foot does cause pain   5/5 strength DF/PF  L LE Open wounds are noted   Skin color is abnormal with ulcer   Edema is  noted   Sensation intact to light touch   Palpation of the foot does cause pain   5/5 strength DF/PF  R dorsalis pedis 1 L dorsalis pedis 1   R posterior tibial 1 L posterior tibial 1     Assessment:     Problem List Items Addressed This Visit     Traumatic ulcer of left lower leg with fat layer exposed ([facility] [address] - Primary    Relevant Orders    Initiate Outpatient Wound Care Protocol          Pre Debridement Measurements:  Are located in the [place]  Documentation Flow Sheet  Post Debridement Measurements:  Wound/Ulcer Descriptions are Pre Debridement except measurements:     Wound 12/01/21 Leg Left; Medial #1  trauma (Active)   Wound Image   01/05/22 0842   Wound Etiology Traumatic 12/01/21 0916   Dressing Status New dressing applied 01/19/22 0910   Wound Cleansed Not Cleansed 01/19/22 0910   Dressing/Treatment Dry dressing;Non adherent 01/19/22 0910   Offloading for Diabetic Foot Ulcers No offloading required 01/19/22 0910   Wound Length (cm) 0.3 cm 01/26/22 0910   Wound Width (cm) 0.3 cm 01/26/22 0910   Wound Depth (cm) 0.2 cm 01/26/22 0910   Wound Surface Area (cm^2) 0.09 cm^2 01/26/22 0910   Change in Wound Size % (l*w) 97 01/26/22 0910   Wound Volume (cm^3) 0.018 cm^3 01/26/22 0910   Wound Healing % 97 01/26/22 0910   Post-Procedure Length (cm) 0.3 cm 01/26/22 0930   Post-Procedure Width (cm) 0.3 cm 01/26/22 0930   Post-Procedure Depth (cm) 0.2 cm 01/26/22 0930   Post-Procedure Surface Area (cm^2) 0.09 cm^2 01/26/22 0930   Post-Procedure Volume (cm^3) 0.018 cm^3 01/26/22 0930   Undermining Starts ___ O'Clock 10 12/17/21 0809   Undermining Ends___ O'Clock 12 12/17/21 0809   Undermining Maxium Distance (cm) 0.7 @ 11 12/17/21 0809   Wound Assessment Pink/red 01/26/22 0910   Drainage Amount Scant 01/26/22 0910   Drainage Description Serosanguinous 01/26/22 0910   Odor None 01/26/22 0910   Hillary-wound Assessment Intact 01/26/22 0910   Wound Thickness Description not for Pressure Injury Full thickness 12/01/21 0931   Number of days: 56        Skin Substitute Applied:         [] APLIGRAF   []44 sq/cm   []88 sq/cm    []132 sq/cm  []176  sq/cm           [] DERMAGRAFT  [] 38sq/cm   []76 sq/cm    []114 sq/cm  []152 sq/cm         [] OASIS   [] 10.5 sq/cm   []21 sq/cm    []4 sq/cm PuraPly  []8 sq/cm PuraPly        [x] OTHER Nushield 6  [x]        sq/cm       Performed by: Kasandra Bledsoe DPM    Wound Type:venous    Consent obtained: Yes    Time out taken: Yes     Fenestrated: Yes    Instrument(s) curette      [] Mesher Utilized    Skin Substitute was Applied to Wound Number(s):     Wound #: 1      Wound 12/01/21 Leg Left; Medial #1  trauma (Active)   Wound Image   12/01/21 0916   Wound Etiology Traumatic 12/01/21 0916   Dressing Status New dressing applied 12/17/21 0838   Wound Cleansed Cleansed with saline 12/17/21 0838   Dressing/Treatment Other (comment) 12/17/21 0838   Offloading for Diabetic Foot Ulcers No offloading required 12/01/21 0958   Wound Length (cm) 1.7 cm 12/17/21 0809   Wound Width (cm) 1 cm 12/17/21 0809   Wound Depth (cm) 0.3 cm 12/17/21 0809   Wound Surface Area (cm^2) 1.7 cm^2 12/17/21 0809   Change in Wound Size % (l*w) 43.33 12/17/21 0809   Wound Volume (cm^3) 0.51 cm^3 12/17/21 0809   Wound Healing % 15 12/17/21 0809   Post-Procedure Length (cm) 1.7 cm 12/17/21 0817   Post-Procedure Width (cm) 1 cm 12/17/21 0817   Post-Procedure Depth (cm) 0.3 cm 12/17/21 0817   Post-Procedure Surface Area (cm^2) 1.7 cm^2 12/17/21 0817   Post-Procedure Volume (cm^3) 0.51 cm^3 12/17/21 0817   Undermining Starts ___ O'Clock 10 12/17/21 0809   Undermining Ends___ O'Clock 12 12/17/21 0809   Undermining Maxium Distance (cm) 0.7 @ 11 12/17/21 0809   Wound Assessment Fibrin; Pink/red 12/17/21 0809   Drainage Amount Moderate 12/17/21 0809   Drainage Description Serosanguinous 12/17/21 0809   Odor None 12/17/21 0809   Hillary-wound Assessment Edematous; Fragile 12/08/21 0949   Wound Thickness Description not for Pressure Injury Full thickness 12/01/21 0931   Number of days: 15         Total Surface Area Covered 2 sq/cm     Amount Wasted 0 sq/cm    Reason for Waste 0      Secured: Yes    Secured With:  [x]Steri Strips    []Sutures     []Staples []Other    Procedural Pain: 2/10     Post Procedural Pain: 4 / 10    Response to Treatment:  Well tolerated by patient. Procedure Note  Indications:  Based on my examination of this patient's wound(s)/ulcer(s) today, debridement is required to promote healing and evaluate the wound base. Performed by: Maikol Jean DPM    Consent obtained:  Yes    Time out taken:  Yes    Pain Control: Anesthetic  Anesthetic: 4% Lidocaine Liquid Topical     Debridement:Excisional Debridement    Using #15 blade scalpel the wound(s)/ulcer(s) was/were sharply debrided down through and including the removal of subcutaneous tissue. Devitalized Tissue Debrided:  fibrin, biofilm, slough and necrotic/eschar to stimulate bleeding to promote healing, post debridement good bleeding base and wound edges noted    Wound/Ulcer #: 1    Percent of Wound/Ulcer Debrided: 100%    Total Surface Area Debrided:  1 sq cm     Estimated Blood Loss:  Minimal  Hemostasis Achieved:  by pressure    Procedural Pain:  3  / 10   Post Procedural Pain:  4 / 10     Response to treatment:  Well tolerated by patient. A culture was done.       Plan:     Pt is not a smoker   - Discussed relationship of smoking and negative affects on wound healing   - Emphasized importance of tobacco avoidace/cessation   - Script for nicotine patch given to patient   - Information regarding support groups for smoking cessation given    In my professional opinion and based off the information that is available at this time this patient has appropriate indication for HBO Therapy: Maybe    Treatment Note please see attached Discharge Instructions    Written patient dismissal instructions given to patient and signed by patient or POA. Discharge Instructions       Visit Discharge/Physician Orders    Discharge condition: Stable    Assessment of pain at discharge:  NONE    Anesthetic used: 4% lidocaine solution    Discharge to: Home    Left via:Private automobile    Accompanied by: accompanied by self    ECF/HHA:   [facility]    Dressing Orders:  [address] #[address] of (7 of 10 total grafts) Nushield applied to left leg ulcer and steri stripped in place. Apply adaptic over graft. Rim top of calf with 2 ABD to keep coban from cutting into leg. Apply Kerlex and coban for one week    Treatment Orders:      wound care  scheduled ARTERIAL STUDY FOR February 9TH AT 2:15 PM ARRIVAL. [facility] followup visit ___________One week with Dr. Winters__________________  (Please note your next appointment above and if you are unable to keep, kindly give a 24 hour notice. Thank you.)    Physician signature:__________________________      If you experience any of the following, please call the [address] [place] [address] [employer] during business hours:    * Increase in Pain  * Temperature over 101  * Increase in drainage from your wound  * Drainage with a foul odor  * Bleeding  * Increase in swelling  * Need for compression bandage changes due to slippage, breakthrough drainage. If you need medical attention outside of the business hours of the [facility] please contact your PCP or go to the nearest emergency room.         Electronically signed by Bradley Souza DPM on 1/26/2022 at 9:44 AM

## 2022-01-26 NOTE — PROGRESS NOTES
NuShield Treatment Note    NAME:  Elena Rivera  YOB: 1970  MEDICAL RECORD NUMBER:  [id number]  DATE:  1/26/2022    Goal:  Patient will receive safe and proper application of skin substitute. Patient will comply with caring for dressing, offloading and reporting complications. Expiration date checked immediately prior to use. Package intact prior to use and no damage noted. Nushield was removed from protective sterile packaging by provider and applied to prepared ulcer bed. NuShield applied with notch to Top Upper Right Corner. NuShield was hydrated with sterile normal saline per provider. NuShield was applied to left leg ulcer and affixed with steri-strips by the provider. Applied nonadherent and steri strip to secure graft (Secondary Dressing)   Coban or ace wrap was applied to secure graft and decrease edema. Patient/caregiver was instructed not to remove dressing and to keep it clean and dry. NuShield may be applied a total of 10 times per wound over a 12 week period. Additionally NuShield may only be used every 12 months per wound. Date of first application of NuShield for this current wound is January 12, 2022.       Application # 7 out of 10           Guidelines followed    Electronically signed by Bj Mcgovern RN on 1/26/2022 at 9:42 AM

## 2022-01-28 DIAGNOSIS — S82.115D NONDISPLACED FRACTURE OF LEFT TIBIAL SPINE, SUBSEQUENT ENCOUNTER FOR CLOSED FRACTURE WITH ROUTINE HEALING: ICD-10-CM

## 2022-01-28 DIAGNOSIS — S82.892D CLOSED LEFT ANKLE FRACTURE, WITH ROUTINE HEALING, SUBSEQUENT ENCOUNTER: Primary | ICD-10-CM

## 2022-02-02 ENCOUNTER — HOSPITAL ENCOUNTER (OUTPATIENT)
Dept: WOUND CARE | Age: 52
Discharge: HOME | End: 2022-02-02
Payer: MEDICARE

## 2022-02-02 VITALS
WEIGHT: 155 LBS | BODY MASS INDEX: 24.91 KG/M2 | HEIGHT: 66 IN | HEART RATE: 74 BPM | SYSTOLIC BLOOD PRESSURE: 112 MMHG | RESPIRATION RATE: 16 BRPM | DIASTOLIC BLOOD PRESSURE: 58 MMHG | TEMPERATURE: 97.4 F

## 2022-02-02 DIAGNOSIS — L97.922 TRAUMATIC ULCER OF LEFT LOWER LEG WITH FAT LAYER EXPOSED (HCC): Primary | ICD-10-CM

## 2022-02-02 PROCEDURE — 6370000000 HC RX 637 (ALT 250 FOR IP): Performed by: PODIATRIST

## 2022-02-02 PROCEDURE — 11042 DBRDMT SUBQ TIS 1ST 20SQCM/<: CPT

## 2022-02-02 RX ORDER — LIDOCAINE HYDROCHLORIDE 40 MG/ML
SOLUTION TOPICAL ONCE
Status: COMPLETED | OUTPATIENT
Start: 2022-02-02 | End: 2022-02-02

## 2022-02-02 RX ORDER — GINSENG 100 MG
CAPSULE ORAL ONCE
Status: CANCELLED | OUTPATIENT
Start: 2022-02-02 | End: 2022-02-02

## 2022-02-02 RX ORDER — LIDOCAINE 40 MG/G
CREAM TOPICAL ONCE
Status: CANCELLED | OUTPATIENT
Start: 2022-02-02 | End: 2022-02-02

## 2022-02-02 RX ORDER — LIDOCAINE HYDROCHLORIDE 20 MG/ML
JELLY TOPICAL ONCE
Status: CANCELLED | OUTPATIENT
Start: 2022-02-02 | End: 2022-02-02

## 2022-02-02 RX ORDER — CLOBETASOL PROPIONATE 0.5 MG/G
OINTMENT TOPICAL ONCE
Status: CANCELLED | OUTPATIENT
Start: 2022-02-02 | End: 2022-02-02

## 2022-02-02 RX ORDER — LIDOCAINE HYDROCHLORIDE 40 MG/ML
SOLUTION TOPICAL ONCE
Status: CANCELLED | OUTPATIENT
Start: 2022-02-02 | End: 2022-02-02

## 2022-02-02 RX ORDER — GENTAMICIN SULFATE 1 MG/G
OINTMENT TOPICAL ONCE
Status: CANCELLED | OUTPATIENT
Start: 2022-02-02 | End: 2022-02-02

## 2022-02-02 RX ORDER — LIDOCAINE 50 MG/G
OINTMENT TOPICAL ONCE
Status: CANCELLED | OUTPATIENT
Start: 2022-02-02 | End: 2022-02-02

## 2022-02-02 RX ORDER — BETAMETHASONE DIPROPIONATE 0.05 %
OINTMENT (GRAM) TOPICAL ONCE
Status: CANCELLED | OUTPATIENT
Start: 2022-02-02 | End: 2022-02-02

## 2022-02-02 RX ORDER — BACITRACIN ZINC AND POLYMYXIN B SULFATE 500; 1000 [USP'U]/G; [USP'U]/G
OINTMENT TOPICAL ONCE
Status: CANCELLED | OUTPATIENT
Start: 2022-02-02 | End: 2022-02-02

## 2022-02-02 RX ORDER — BACITRACIN, NEOMYCIN, POLYMYXIN B 400; 3.5; 5 [USP'U]/G; MG/G; [USP'U]/G
OINTMENT TOPICAL ONCE
Status: CANCELLED | OUTPATIENT
Start: 2022-02-02 | End: 2022-02-02

## 2022-02-02 RX ADMIN — LIDOCAINE HYDROCHLORIDE 5 ML: 40 SOLUTION TOPICAL at 09:42

## 2022-02-02 NOTE — PROGRESS NOTES
Wound Healing Center  History and Physical/Consultation  Podiatry    Referring Physician : Barrera Spring MD  [address] RECORD NUMBER:  [id number]  AGE: 46 y.o. GENDER: female  : 1970  EPISODE DATE:  2022  Subjective:     Chief Complaint   Patient presents with    Wound Check     LEFT LEG         HISTORY of PRESENT ILLNESS HPI     Albino Bell is a 46 y.o. female who presents today for wound/ulcer evaluation. History of Wound Context:  The patient has had a wound of left leg trauma which was first noted approximately months. This has been treated antibiotic. On their initial visit to the wound healing center, 20 ,  the patient has noted that the wound has not been improving. The patient has had similar previous wounds in the past.      Pt is not on abx at time of initial visit.       Wound/Ulcer Pain Timing/Severity: constant  Quality of pain: aching  Severity:  5 / 10   Modifying Factors: Pain worsens with walking  Associated Signs/Symptoms: edema, erythema and drainage    Ulcer Identification:  Ulcer Type: venous and traumatic  Contributing Factors: edema and venous stasis    Diabetic/Pressure/Non Pressure Ulcers onl y:  Ulcer: Non-Pressure ulcer, fat layer exposed    If patient has diabetic lower extremity wounds  Russell Classification of diabetic lower extremity wounds:    Grade Description   []  0 No open wound   []  1 Superficial ulcer involving the full skin thickness   []  2 Deep ulcer involves ligament, tendon, joint capsule, or fascia  No bone involvement or abscess presence   []  3 Deep Ulcer with abcess formation and/or osteomyelitis   []  4 Localized gangrene   []  5 Extensive gangrene of the foot     Wound: Crush Injury        21  Debrided, puraply  applied    21  Debrided, puraply applied      21  Debrided, puraply applied     22  Debrided, puraply applied 8 cm     22  Nushield applied, debrided    22  Debrided, contx tx and care, nushield applied     2-2-22  Debrided, apply snow  PAST MEDICAL HISTORY      Diagnosis Date    Hearing loss     congenital     Past Surgical History:   Procedure Laterality Date    FRACTURE SURGERY       Family History   Problem Relation Age of Onset   Prince Tenorio Hearing Loss Mother         deaf    Hearing Loss Father         deaf    Heart Disease Father         pacemaker     Social History     Tobacco Use    Smoking status: Former Smoker     Packs/day: 1.00     Years: 20.00     Pack years: 20.00     Types: Cigarettes     Quit date: 1/10/2007     Years since quitting: 15.0    Smokeless tobacco: Never Used   Vaping Use    Vaping Use: Never used   Substance Use Topics    Alcohol use: Yes     Comment: 3 Seagrams per month    Drug use: Yes     Types: Marijuana (Weed)     Comment: daily     No Known Allergies  Current Outpatient Medications on File Prior to Encounter   Medication Sig Dispense Refill    LYSINE PO Take by mouth      B Complex-C-E-Zn (STRESS B-COMPLEX/VIT C/ZINC) TABS Take 1 tablet by mouth daily 30 tablet 2    gabapentin (NEURONTIN) 400 MG capsule       calcium carbonate (OSCAL) 500 MG TABS tablet Take 500 mg by mouth daily       Biotin 1 MG CAPS Take by mouth       Multiple Vitamins-Minerals (THERAPEUTIC MULTIVITAMIN-MINERALS) tablet Take 1 tablet by mouth daily       acetaminophen (TYLENOL) 325 MG tablet Take 650 mg by mouth every 6 hours as needed for Pain       No current facility-administered medications on file prior to encounter.        REVIEW OF SYSTEMS   ROS : All others Negative if blank [], Positive if [x]  General Vascular   [] Fevers [] Claudication   [] Chills [] Rest Pain   Skin Neurologic   [x] Tissue Loss [x] Lower extremity neuropathy       12-8-21  Debrided, cont tx and care, levaquin 500mg daily x 7 days  Objective:    BP (!) 112/58   Pulse 74   Temp 97.4 °F (36.3 °C) (Temporal)   Resp 16   Ht 5' 6\" (1.676 m)   Wt 155 lb (70.3 kg)   BMI 25.02 kg/m²   Wt Readings from Last 3 Encounters:   02/02/22 155 lb (70.3 kg)   01/19/22 155 lb (70.3 kg)   01/05/22 155 lb (70.3 kg)       PHYSICAL EXAM   CONSTITUTIONAL:   Awake, alert, cooperative   PSYCHIATRIC :  Oriented to time, place and person      normal insight to disease process  EXTREMITIES:   R LE Open wounds are not noted   Skin color is normal   Edema is  noted   Sensation intact to light touch   Palpation of the foot does cause pain   5/5 strength DF/PF  L LE Open wounds are noted   Skin color is abnormal with ulcer   Edema is  noted   Sensation intact to light touch   Palpation of the foot does cause pain   5/5 strength DF/PF  R dorsalis pedis 1 L dorsalis pedis 1   R posterior tibial 1 L posterior tibial 1     Assessment:     Problem List Items Addressed This Visit     Traumatic ulcer of left lower leg with fat layer exposed ([facility] [address] - Primary    Relevant Orders    Initiate Outpatient Wound Care Protocol          Pre Debridement Measurements:  Are located in the [place]  Documentation Flow Sheet  Post Debridement Measurements:  Wound/Ulcer Descriptions are Pre Debridement except measurements:     Wound 12/01/21 Leg Left; Medial #1  trauma (Active)   Wound Image   01/05/22 0842   Wound Etiology Traumatic 12/01/21 0916   Dressing Status New dressing applied 01/26/22 0930   Wound Cleansed Cleansed with saline 01/26/22 0930   Dressing/Treatment Dry dressing;Non adherent 01/19/22 0910   Offloading for Diabetic Foot Ulcers No offloading required 01/26/22 0930   Wound Length (cm) 0.3 cm 02/02/22 0938   Wound Width (cm) 0.2 cm 02/02/22 0938   Wound Depth (cm) 0.1 cm 02/02/22 0938   Wound Surface Area (cm^2) 0.06 cm^2 02/02/22 0938   Change in Wound Size % (l*w) 98 02/02/22 0938   Wound Volume (cm^3) 0.006 cm^3 02/02/22 0938   Wound Healing % 99 02/02/22 0938   Post-Procedure Length (cm) 0.3 cm 02/02/22 1018   Post-Procedure Width (cm) 0.2 cm 02/02/22 1018   Post-Procedure Depth (cm) 0.1 cm 02/02/22 1018   Post-Procedure Surface Area (cm^2) 0.06 cm^2 02/02/22 1018   Post-Procedure Volume (cm^3) 0.006 cm^3 02/02/22 1018   Undermining Starts ___ O'Clock 10 12/17/21 0809   Undermining Ends___ O'Clock 12 12/17/21 0809   Undermining Maxium Distance (cm) 0.7 @ 11 12/17/21 0809   Wound Assessment Eschar dry 02/02/22 0938   Drainage Amount Scant 02/02/22 0938   Drainage Description Yellow 02/02/22 0938   Odor None 02/02/22 0938   Hillary-wound Assessment Intact 02/02/22 0938   Wound Thickness Description not for Pressure Injury Full thickness 12/01/21 0931   Number of days: 63          Procedure Note  Indications:  Based on my examination of this patient's wound(s)/ulcer(s) today, debridement is required to promote healing and evaluate the wound base. Performed by: Isa Long DPM    Consent obtained:  Yes    Time out taken:  Yes    Pain Control: Anesthetic  Anesthetic: 4% Lidocaine Liquid Topical     Debridement:Excisional Debridement    Using #15 blade scalpel the wound(s)/ulcer(s) was/were sharply debrided down through and including the removal of subcutaneous tissue. Devitalized Tissue Debrided:  fibrin, biofilm, slough and necrotic/eschar to stimulate bleeding to promote healing, post debridement good bleeding base and wound edges noted    Wound/Ulcer #: 1    Percent of Wound/Ulcer Debrided: 100%    Total Surface Area Debrided:  1 sq cm     Estimated Blood Loss:  Minimal  Hemostasis Achieved:  by pressure    Procedural Pain:  3  / 10   Post Procedural Pain:  4 / 10     Response to treatment:  Well tolerated by patient. A culture was done.       Plan:     Pt is not a smoker   - Discussed relationship of smoking and negative affects on wound healing   - Emphasized importance of tobacco avoidace/cessation   - Script for nicotine patch given to patient   - Information regarding support groups for smoking cessation given    In my professional opinion and based off the information that is available at this time this patient has appropriate indication for HBO Therapy: Maybe    Treatment Note please see attached Discharge Instructions    Written patient dismissal instructions given to patient and signed by patient or POA. Discharge Instructions       Visit Discharge/Physician Orders      Discharge condition: Stable     Assessment of pain at discharge:  NONE     Anesthetic used: 4% lidocaine solution     Discharge to: Home     Left via:Private automobile     Accompanied by: accompanied by self     ECF/HHA:   [facility]     Dressing Orders:  CLEAN LEFT LEG ULCER WITH NORMAL SALINE. APPLY SALINE MOIST MAARL INSIDE WOUND BED, COVER WITH BORDERED GAUZE PATCH AND CHANGE EVERY OTHER DAY. WEAR SPANDAGRIP TO LEFT LEG DURING THE DAY     Treatment Orders:       wound care  scheduled ARTERIAL STUDY FOR February 9TH AT 2:15 PM ARRIVAL.       [facility] followup visit ___________One week with Dr. Winters__________________  (Please note your next appointment above and if you are unable to keep, kindly give a 24 hour notice. Thank you.)    Physician signature:__________________________      If you experience any of the following, please call the [employer] [facility] [employer] during business hours:    * Increase in Pain  * Temperature over 101  * Increase in drainage from your wound  * Drainage with a foul odor  * Bleeding  * Increase in swelling  * Need for compression bandage changes due to slippage, breakthrough drainage. If you need medical attention outside of the business hours of the [address] [place] [address] [facility] [employer] please contact your PCP or go to the nearest emergency room.         Electronically signed by Roxana Rodriguez DPM on 2/2/2022 at 10:28 AM

## 2022-02-02 NOTE — PROGRESS NOTES
[address]:     [employer] [id number] [address] p: [phone] f: [phone]     Ordering Center:     [address] [place] [address],  [address] [phone]  WOUND CARE Dept: Cristobal 6 [phone]    Patient Information:      Eduardo Child  [address] [facility] [phone]   : 1970  AGE: 46 y.o. GENDER: female   EPISODE DATE: 2022    Insurance:      PRIMARY INSURANCE:  Plan: [facility] MEDICARE COMPLETE  Coverage: [facility] MEDICARE  Effective Date: 2021  Group Number: [unfilled]  Subscriber Number: [id number] - (Medicare Managed)    Payor/Plan Subscr  Sex Relation Sub. Ins. ID Effective Group Num   1.  [facility] 1970 Female Self [id number]                                   [address] [address]       Patient Wound Information:      Problem List Items Addressed This Visit     Traumatic ulcer of left lower leg with fat layer exposed ([facility] [address] - Primary    Relevant Orders    Initiate Outpatient Wound Care Protocol          WOUNDS REQUIRING DRESSING SUPPLIES:     Wound 21 Leg Left; Medial #1  trauma (Active)   Wound Image   22 0842   Wound Etiology Traumatic 21 0916   Dressing Status New dressing applied 22 1018   Wound Cleansed Cleansed with saline 22 1018   Dressing/Treatment Collagen with Ag;Silicone border  1018   Offloading for Diabetic Foot Ulcers No offloading required 22 1018   Wound Length (cm) 0.3 cm 22 0938   Wound Width (cm) 0.2 cm 22 0938   Wound Depth (cm) 0.1 cm 22 0938   Wound Surface Area (cm^2) 0.06 cm^2 22 0938   Change in Wound Size % (l*w) 98 22 0938   Wound Volume (cm^3) 0.006 cm^3 22 0938   Wound Healing % 99 22 0938   Post-Procedure Length (cm) 0.3 cm 22 1018   Post-Procedure Width (cm) 0.2 cm 22 1018   Post-Procedure Depth (cm) 0.1 cm 02/02/22 1018   Post-Procedure Surface Area (cm^2) 0.06 cm^2 02/02/22 1018   Post-Procedure Volume (cm^3) 0.006 cm^3 02/02/22 1018   Undermining Starts ___ O'Clock 10 12/17/21 0809   Undermining Ends___ O'Clock 12 12/17/21 0809   Undermining Maxium Distance (cm) 0.7 @ 11 12/17/21 0809   Wound Assessment Eschar dry 02/02/22 0938   Drainage Amount Scant 02/02/22 0938   Drainage Description Yellow 02/02/22 0938   Odor None 02/02/22 0938   Hillary-wound Assessment Intact 02/02/22 0938   Wound Thickness Description not for Pressure Injury Full thickness 12/01/21 0931   Number of days: 63      Moderate serosanguinous post debridement drainage  Full thickness     Supplies Requested :      WOUND #: 1   PRIMARY DRESSING:  Collagen with silver   Cover and Secure with: 4X4 gauze pad  Other 6 x 6 inch adhesive bordered gauze     FREQUENCY OF DRESSING CHANGES:  Every other day         ADDITIONAL ITEMS:  [] Gloves Small  [x] Gloves Medium [] Gloves Large [] Gloves XLarge  [] Tape 1\" [x] Tape 2\" [] Tape 3\"  [] Medipore Tape  [x] Saline  [] Skin Prep   [] Adhesive Remover   [] Cotton Tip Applicators   [] Other:    Patient Wound(s) Debrided: [x] Yes if yes please add date 2-2-2022   [] No    Debribement Type: Excisional/Sharp    Is the patient currently on an antibiotic for their Wound(s): [] Yes if yes please add name and dose    [x] No    Patient currently being seen by Home Health: [] Yes   [x] No    Duration for needed supplies:  []15  [x]30  []60  []90 Days    Electronically signed by Lizzy Reyes RN on 2/2/2022 at 4:13 PM     Provider Information:      PROVIDER'S NAME: Dr. Phuong Tripp    NPI: [id number]

## 2022-02-04 ENCOUNTER — TELEPHONE (OUTPATIENT)
Dept: ADMINISTRATIVE | Age: 52
End: 2022-02-04

## 2022-02-04 NOTE — TELEPHONE ENCOUNTER
Call placed to patient.     Future Appointments   Date Time Provider Kinsey Kessler   2/9/2022  8:30 AM AYLIN Lance WOUND [facility]   2/9/2022  2:30 PM SEB VASCULAR LAB RM 1 SEB[facility] VASC [facility]   2/11/2022  8:30 AM SCHEDULE, SE ORTHO RES [facility]

## 2022-02-04 NOTE — TELEPHONE ENCOUNTER
Patient called and stated she did not come to her appointment at 8:00 am this morning as she thought the office would be closed. Please contact her when she can be seen again.

## 2022-02-09 ENCOUNTER — HOSPITAL ENCOUNTER (OUTPATIENT)
Dept: INTERVENTIONAL RADIOLOGY/VASCULAR | Age: 52
Discharge: HOME | End: 2022-02-11
Payer: MEDICARE

## 2022-02-09 ENCOUNTER — HOSPITAL ENCOUNTER (OUTPATIENT)
Dept: WOUND CARE | Age: 52
Discharge: HOME | End: 2022-02-09
Payer: MEDICARE

## 2022-02-09 VITALS
DIASTOLIC BLOOD PRESSURE: 64 MMHG | WEIGHT: 155 LBS | HEART RATE: 72 BPM | TEMPERATURE: 97.1 F | BODY MASS INDEX: 24.91 KG/M2 | SYSTOLIC BLOOD PRESSURE: 110 MMHG | HEIGHT: 66 IN | RESPIRATION RATE: 17 BRPM

## 2022-02-09 DIAGNOSIS — L97.922 TRAUMATIC ULCER OF LEFT LOWER LEG WITH FAT LAYER EXPOSED (HCC): Primary | ICD-10-CM

## 2022-02-09 DIAGNOSIS — I73.9 PVD (PERIPHERAL VASCULAR DISEASE) (HCC): ICD-10-CM

## 2022-02-09 DIAGNOSIS — L97.922 TRAUMATIC ULCER OF LEFT LOWER LEG WITH FAT LAYER EXPOSED (HCC): ICD-10-CM

## 2022-02-09 PROCEDURE — 11042 DBRDMT SUBQ TIS 1ST 20SQCM/<: CPT

## 2022-02-09 PROCEDURE — 93922 UPR/L XTREMITY ART 2 LEVELS: CPT

## 2022-02-09 PROCEDURE — 6370000000 HC RX 637 (ALT 250 FOR IP): Performed by: PODIATRIST

## 2022-02-09 RX ORDER — BACITRACIN ZINC AND POLYMYXIN B SULFATE 500; 1000 [USP'U]/G; [USP'U]/G
OINTMENT TOPICAL ONCE
Status: CANCELLED | OUTPATIENT
Start: 2022-02-09 | End: 2022-02-09

## 2022-02-09 RX ORDER — LIDOCAINE 50 MG/G
OINTMENT TOPICAL ONCE
Status: CANCELLED | OUTPATIENT
Start: 2022-02-09 | End: 2022-02-09

## 2022-02-09 RX ORDER — LIDOCAINE HYDROCHLORIDE 40 MG/ML
SOLUTION TOPICAL ONCE
Status: CANCELLED | OUTPATIENT
Start: 2022-02-09 | End: 2022-02-09

## 2022-02-09 RX ORDER — LIDOCAINE 40 MG/G
CREAM TOPICAL ONCE
Status: CANCELLED | OUTPATIENT
Start: 2022-02-09 | End: 2022-02-09

## 2022-02-09 RX ORDER — LIDOCAINE HYDROCHLORIDE 20 MG/ML
JELLY TOPICAL ONCE
Status: CANCELLED | OUTPATIENT
Start: 2022-02-09 | End: 2022-02-09

## 2022-02-09 RX ORDER — GINSENG 100 MG
CAPSULE ORAL ONCE
Status: CANCELLED | OUTPATIENT
Start: 2022-02-09 | End: 2022-02-09

## 2022-02-09 RX ORDER — GENTAMICIN SULFATE 1 MG/G
OINTMENT TOPICAL ONCE
Status: CANCELLED | OUTPATIENT
Start: 2022-02-09 | End: 2022-02-09

## 2022-02-09 RX ORDER — CLOBETASOL PROPIONATE 0.5 MG/G
OINTMENT TOPICAL ONCE
Status: CANCELLED | OUTPATIENT
Start: 2022-02-09 | End: 2022-02-09

## 2022-02-09 RX ORDER — BETAMETHASONE DIPROPIONATE 0.05 %
OINTMENT (GRAM) TOPICAL ONCE
Status: CANCELLED | OUTPATIENT
Start: 2022-02-09 | End: 2022-02-09

## 2022-02-09 RX ORDER — LIDOCAINE HYDROCHLORIDE 40 MG/ML
SOLUTION TOPICAL ONCE
Status: COMPLETED | OUTPATIENT
Start: 2022-02-09 | End: 2022-02-09

## 2022-02-09 RX ORDER — BACITRACIN, NEOMYCIN, POLYMYXIN B 400; 3.5; 5 [USP'U]/G; MG/G; [USP'U]/G
OINTMENT TOPICAL ONCE
Status: CANCELLED | OUTPATIENT
Start: 2022-02-09 | End: 2022-02-09

## 2022-02-09 RX ADMIN — LIDOCAINE HYDROCHLORIDE: 40 SOLUTION TOPICAL at 10:10

## 2022-02-09 NOTE — PROGRESS NOTES
Wound Healing Center  History and Physical/Consultation  Podiatry    Referring Physician : Clif Trivedi MD  [facility] [address] RECORD NUMBER:  [id number]  AGE: 46 y.o. GENDER: female  : 1970  EPISODE DATE:  2022  Subjective:     Chief Complaint   Patient presents with    Wound Check     LLE         HISTORY of PRESENT ILLNESS HPI     Yesenia Tracy is a 46 y.o. female who presents today for wound/ulcer evaluation. History of Wound Context:  The patient has had a wound of left leg trauma which was first noted approximately months. This has been treated antibiotic. On their initial visit to the wound healing center, 20 ,  the patient has noted that the wound has not been improving. The patient has had similar previous wounds in the past.      Pt is not on abx at time of initial visit.       Wound/Ulcer Pain Timing/Severity: constant  Quality of pain: aching  Severity:  5 / 10   Modifying Factors: Pain worsens with walking  Associated Signs/Symptoms: edema, erythema and drainage    Ulcer Identification:  Ulcer Type: venous and traumatic  Contributing Factors: edema and venous stasis    Diabetic/Pressure/Non Pressure Ulcers onl y:  Ulcer: Non-Pressure ulcer, fat layer exposed    If patient has diabetic lower extremity wounds  Russell Classification of diabetic lower extremity wounds:    Grade Description   []  0 No open wound   []  1 Superficial ulcer involving the full skin thickness   []  2 Deep ulcer involves ligament, tendon, joint capsule, or fascia  No bone involvement or abscess presence   []  3 Deep Ulcer with abcess formation and/or osteomyelitis   []  4 Localized gangrene   []  5 Extensive gangrene of the foot     Wound: Crush Injury        21  Debrided, puraply  applied    21  Debrided, puraply applied      21  Debrided, puraply applied     22  Debrided, puraply applied 8 cm     22  Nushield applied, debrided    22  Debrided, contx tx and care, allyssa applied     2-2-22  Debrided, apply snow    2-9-22  Debrided, healing well  PAST MEDICAL HISTORY      Diagnosis Date    Hearing loss     congenital     Past Surgical History:   Procedure Laterality Date    FRACTURE SURGERY       Family History   Problem Relation Age of Onset   [facility] Hearing Loss Mother         deaf    Hearing Loss Father         deaf    Heart Disease Father         pacemaker     Social History     Tobacco Use    Smoking status: Former Smoker     Packs/day: 1.00     Years: 20.00     Pack years: 20.00     Types: Cigarettes     Quit date: 1/10/2007     Years since quitting: 15.0    Smokeless tobacco: Never Used   Vaping Use    Vaping Use: Never used   Substance Use Topics    Alcohol use: Yes     Comment: 3 Seagrams per month    Drug use: Yes     Types: Marijuana (Weed)     Comment: daily     No Known Allergies  Current Outpatient Medications on File Prior to Encounter   Medication Sig Dispense Refill    acetaminophen (TYLENOL) 325 MG tablet Take 650 mg by mouth every 6 hours as needed for Pain      LYSINE PO Take by mouth      B Complex-C-E-Zn (STRESS B-COMPLEX/VIT C/ZINC) TABS Take 1 tablet by mouth daily 30 tablet 2    gabapentin (NEURONTIN) 400 MG capsule       calcium carbonate (OSCAL) 500 MG TABS tablet Take 500 mg by mouth daily       Biotin 1 MG CAPS Take by mouth       Multiple Vitamins-Minerals (THERAPEUTIC MULTIVITAMIN-MINERALS) tablet Take 1 tablet by mouth daily        No current facility-administered medications on file prior to encounter.        REVIEW OF SYSTEMS   ROS : All others Negative if blank [], Positive if [x]  General Vascular   [] Fevers [] Claudication   [] Chills [] Rest Pain   Skin Neurologic   [x] Tissue Loss [x] Lower extremity neuropathy       12-8-21  Debrided, cont tx and care, levaquin 500mg daily x 7 days  Objective:    /64   Pulse 72   Temp 97.1 °F (36.2 °C) (Temporal)   Resp 17   Ht 5' 6\" (1.676 m)   Wt 155 lb (70.3 kg)   BMI 25.02 kg/m²   Wt Readings from Last 3 Encounters:   02/09/22 155 lb (70.3 kg)   02/02/22 155 lb (70.3 kg)   01/19/22 155 lb (70.3 kg)       PHYSICAL EXAM   CONSTITUTIONAL:   Awake, alert, cooperative   PSYCHIATRIC :  Oriented to time, place and person      normal insight to disease process  EXTREMITIES:   R LE Open wounds are not noted   Skin color is normal   Edema is  noted   Sensation intact to light touch   Palpation of the foot does cause pain   5/5 strength DF/PF  L LE Open wounds are noted   Skin color is abnormal with ulcer   Edema is  noted   Sensation intact to light touch   Palpation of the foot does cause pain   5/5 strength DF/PF  R dorsalis pedis 1 L dorsalis pedis 1   R posterior tibial 1 L posterior tibial 1     Assessment:     Problem List Items Addressed This Visit     Traumatic ulcer of left lower leg with fat layer exposed ([facility] [address] - Primary    Relevant Orders    Initiate Outpatient Wound Care Protocol          Pre Debridement Measurements:  Are located in the [place]  Documentation Flow Sheet  Post Debridement Measurements:  Wound/Ulcer Descriptions are Pre Debridement except measurements:     Wound 12/01/21 Leg Left; Medial #1  trauma (Active)   Wound Image   01/05/22 0842   Wound Etiology Traumatic 12/01/21 0916   Dressing Status New dressing applied 02/02/22 1018   Wound Cleansed Cleansed with saline 02/02/22 1018   Dressing/Treatment Collagen with Ag;Silicone border 19/15/84 1018   Offloading for Diabetic Foot Ulcers No offloading required 02/02/22 1018   Wound Length (cm) 0.2 cm 02/09/22 1002   Wound Width (cm) 0.1 cm 02/09/22 1002   Wound Depth (cm) 0.1 cm 02/09/22 1002   Wound Surface Area (cm^2) 0.02 cm^2 02/09/22 1002   Change in Wound Size % (l*w) 99.33 02/09/22 1002   Wound Volume (cm^3) 0.002 cm^3 02/09/22 1002   Wound Healing % 100 02/09/22 1002   Post-Procedure Length (cm) 0.3 cm 02/02/22 1018   Post-Procedure Width (cm) 0.2 cm 02/02/22 1018   Post-Procedure Depth (cm) 0.1 cm 02/02/22 1018   Post-Procedure Surface Area (cm^2) 0.06 cm^2 02/02/22 1018   Post-Procedure Volume (cm^3) 0.006 cm^3 02/02/22 1018   Undermining Starts ___ O'Clock 10 12/17/21 0809   Undermining Ends___ O'Clock 12 12/17/21 0809   Undermining Maxium Distance (cm) 0.7 @ 11 12/17/21 0809   Wound Assessment Fibrin;Dry 02/09/22 1002   Drainage Amount Scant 02/09/22 1002   Drainage Description Yellow 02/09/22 1002   Odor None 02/09/22 1002   Hillary-wound Assessment Intact 02/09/22 1002   Wound Thickness Description not for Pressure Injury Full thickness 12/01/21 0931   Number of days: 70          Procedure Note  Indications:  Based on my examination of this patient's wound(s)/ulcer(s) today, debridement is required to promote healing and evaluate the wound base. Performed by: Awais Gómez DPM    Consent obtained:  Yes    Time out taken:  Yes    Pain Control: Anesthetic  Anesthetic: 4% Lidocaine Liquid Topical     Debridement:Excisional Debridement    Using #15 blade scalpel the wound(s)/ulcer(s) was/were sharply debrided down through and including the removal of subcutaneous tissue. Devitalized Tissue Debrided:  fibrin, biofilm, slough and necrotic/eschar to stimulate bleeding to promote healing, post debridement good bleeding base and wound edges noted    Wound/Ulcer #: 1    Percent of Wound/Ulcer Debrided: 100%    Total Surface Area Debrided:  1 sq cm     Estimated Blood Loss:  Minimal  Hemostasis Achieved:  by pressure    Procedural Pain:  3  / 10   Post Procedural Pain:  4 / 10     Response to treatment:  Well tolerated by patient. A culture was done.       Plan:     Pt is not a smoker   - Discussed relationship of smoking and negative affects on wound healing   - Emphasized importance of tobacco avoidace/cessation   - Script for nicotine patch given to patient   - Information regarding support groups for smoking cessation given    In my professional opinion and based off the information that is available at this time this patient has appropriate indication for HBO Therapy: Maybe    Treatment Note please see attached Discharge Instructions    Written patient dismissal instructions given to patient and signed by patient or POA. Discharge Instructions       Visit Discharge/Physician Orders    Discharge condition: Stable     Assessment of pain at discharge:  NONE     Anesthetic used: 4% lidocaine solution     Discharge to: Home     Left via:Private automobile     Accompanied by: accompanied by self     ECF/HHA:   [facility]     Dressing Orders:  CLEAN LEFT LEG ULCER WITH NORMAL SALINE. APPLY SALINE MOIST MARAL INSIDE WOUND BED, COVER WITH BORDERED GAUZE PATCH AND CHANGE EVERY OTHER DAY. WEAR SPANDAGRIP TO LEFT LEG DURING THE DAY     Treatment Orders:       wound care  scheduled ARTERIAL STUDY FOR February 9TH AT 2:15 PM ARRIVAL.       [facility] followup visit ___________One week with Dr. Winters__________________  (Please note your next appointment above and if you are unable to keep, kindly give a 24 hour notice. Thank you.)    Physician signature:__________________________      If you experience any of the following, please call the [address] [place] [address] during business hours:    * Increase in Pain  * Temperature over 101  * Increase in drainage from your wound  * Drainage with a foul odor  * Bleeding  * Increase in swelling  * Need for compression bandage changes due to slippage, breakthrough drainage. If you need medical attention outside of the business hours of the [address] [place] [address] [employer] please contact your PCP or go to the nearest emergency room.         Electronically signed by Donzella Brittle, DPM on 2/9/2022 at 10:15 AM

## 2022-02-11 ENCOUNTER — HOSPITAL ENCOUNTER (OUTPATIENT)
Dept: GENERAL RADIOLOGY | Age: 52
Discharge: HOME | End: 2022-02-13
Payer: MEDICARE

## 2022-02-11 ENCOUNTER — OFFICE VISIT (OUTPATIENT)
Dept: ORTHOPEDIC SURGERY | Age: 52
End: 2022-02-11
Payer: MEDICARE

## 2022-02-11 DIAGNOSIS — S82.115D NONDISPLACED FRACTURE OF LEFT TIBIAL SPINE, SUBSEQUENT ENCOUNTER FOR CLOSED FRACTURE WITH ROUTINE HEALING: ICD-10-CM

## 2022-02-11 DIAGNOSIS — S82.892D CLOSED LEFT ANKLE FRACTURE, WITH ROUTINE HEALING, SUBSEQUENT ENCOUNTER: ICD-10-CM

## 2022-02-11 DIAGNOSIS — S82.892D CLOSED LEFT ANKLE FRACTURE, WITH ROUTINE HEALING, SUBSEQUENT ENCOUNTER: Primary | ICD-10-CM

## 2022-02-11 PROCEDURE — G8484 FLU IMMUNIZE NO ADMIN: HCPCS | Performed by: ORTHOPAEDIC SURGERY

## 2022-02-11 PROCEDURE — G8419 CALC BMI OUT NRM PARAM NOF/U: HCPCS | Performed by: ORTHOPAEDIC SURGERY

## 2022-02-11 PROCEDURE — 99212 OFFICE O/P EST SF 10 MIN: CPT | Performed by: ORTHOPAEDIC SURGERY

## 2022-02-11 PROCEDURE — G8427 DOCREV CUR MEDS BY ELIG CLIN: HCPCS | Performed by: ORTHOPAEDIC SURGERY

## 2022-02-11 PROCEDURE — 73560 X-RAY EXAM OF KNEE 1 OR 2: CPT

## 2022-02-11 PROCEDURE — 99213 OFFICE O/P EST LOW 20 MIN: CPT | Performed by: ORTHOPAEDIC SURGERY

## 2022-02-11 PROCEDURE — 1036F TOBACCO NON-USER: CPT | Performed by: ORTHOPAEDIC SURGERY

## 2022-02-11 PROCEDURE — 3017F COLORECTAL CA SCREEN DOC REV: CPT | Performed by: ORTHOPAEDIC SURGERY

## 2022-02-11 PROCEDURE — 73610 X-RAY EXAM OF ANKLE: CPT

## 2022-02-11 RX ORDER — NUTRITIONAL SUPPLEMENT
POWDER (GRAM) ORAL
COMMUNITY

## 2022-02-11 NOTE — PATIENT INSTRUCTIONS
Continue wound care and follow up with Dr. Johnna Donohue. Continue Adolfo and vitamins until wounds are healed    OK to use exercise as pain allows  Start with lower weight for legs exercises. Keep feet on ground (ex. avoid knee extensions)    Range of motion and exercises to left ankle and left knee    Ice, elevation, and compression sleeve for swelling  Swelling can be normal up to a year after injury/surgery    Follow up as needed    Please call the office at [phone] or send [employer] message to providers sooner with any questions or concerns  Strongly recommend all of our patients sign up for [employer] in order to have direct communication VIA [employer] JIHAN with our clinic staff. Patient Education        Ankle: Exercises  Introduction  Here are some examples of exercises for you to try. The exercises may be suggested for a condition or for rehabilitation. Start each exercise slowly. Ease off the exercises if you start to have pain. You will be told when to start these exercises and which ones will work best for you. How to do the exercises  'Alphabet' exercise    1. Trace the alphabet with your toe. This helps your ankle move in all directions. Side-to-side knee swing exercise    1. Sit in a chair with your foot flat on the floor. 2. Slowly move your knee from side to side while keeping your foot pressed flat. 3. Continue this exercise for 2 to 3 minutes. Towel curl    1. While sitting, place your foot on a towel on the floor and scrunch the towel toward you with your toes. 2. Then use your toes to push the towel away from you. 3. Make this exercise more challenging by placing a weighted object, such as a soup can, on the other end of the towel. Towel stretch    1. Sit with your legs extended and knees straight. 2. Place a towel around your foot just under the toes. 3. Hold each end of the towel in each hand, with your hands above your knees.   4. Pull back with the towel so that your foot stretches toward you. 5. Hold the position for at least 15 to 30 seconds. 6. Repeat 2 to 4 times a session, up to 5 sessions a day. Ankle eversion exercise    1. Start by sitting with your foot flat on the floor and pushing it outward against an immovable object such as the wall or heavy furniture. Hold for about 6 seconds, then relax. Repeat 8 to 12 times. 2. After you feel comfortable with this, try using rubber tubing looped around the outside of your feet for resistance. Push your foot out to the side against the tubing, and then count to 10 as you slowly bring your foot back to the middle. Repeat 8 to 12 times. Isometric opposition exercises    1. While sitting, put your feet together flat on the floor. 2. Press your injured foot inward against your other foot. Hold for about 6 seconds, and relax. Repeat 8 to 12 times. 3. Then place the heel of your other foot on top of the injured one. Push down with the top heel while trying to push up with your injured foot. Hold for about 6 seconds, and relax. Repeat 8 to 12 times. Follow-up care is a key part of your treatment and safety. Be sure to make and go to all appointments, and call your doctor if you are having problems. It's also a good idea to know your test results and keep a list of the medicines you take. Where can you learn more? Go to [email or web address] and sign in to your [employer] account. Enter V217 in the [facility] box to learn more about \"Ankle: Exercises. \"     If you do not have an account, please click on the \"Sign Up Now\" link. Current as of: July 1, 2021               Content Version: 13.1  © 8131-8200 Healthwise, Incorporated. Care instructions adapted under license by [facility] ([facility]). If you have questions about a medical condition or this instruction, always ask your healthcare professional. [address] any warranty or liability for your use of this information.

## 2022-02-11 NOTE — PROGRESS NOTES
Chief Complaint   Patient presents with    Follow Up After Procedure       #[id number] used during visit. Left lat mal, L ankle ATFL, tibial spine fx ORIF 08/02/21 at [facility]. complaining of swelling and occasional irritation to ankle. ambulating with no device. looking for a new job, requesting to be cleared to RTW. SUBJECTIVE:     Patient presents today to clinic for follow up regarding an injury she sustained to her left lower extremity. She was treated for left tibial spine fracture, left lateral malleolus fracture, and left ankle syndesmotic injury on 8/2/2021. She notes her pain to both the knee and ankle are improving continually. She has some persistent swelling in the left ankle. Her pain feels grossly stable for her. She has no numbness or tingling in the extremity. She is interested in resuming her lower body exercises that she was completed before the injury. She also is looking to return to work. No additional complaints at this time. Review of Systems -   General ROS: negative for - chills, fatigue, fever or night sweats  Respiratory ROS: no cough, shortness of breath, or wheezing  Cardiovascular ROS: no chest pain or dyspnea on exertion  Gastrointestinal ROS: no abdominal pain, change in bowel habits, or black or bloody stools  Genitourinary: no hematuria, dysuria, or incontinence   Musculoskeletal ROS:see above  Neurological ROS: no TIA or stroke symptoms       OBJECTIVE:   Alert and oriented X 3, no acute distress, respirations easy and unlabored with no audible wheezes, skin warm and dry, speech and dress appropriate for noted age, affect euthymic. Extremity:    Evaluation of the left lower extremity reveals:  -Wound over the medial aspect of the left proximal tibia, overall skin is intact however there is a very small area of skin opening with no active drainage.  No signs of infection in this region.  -Surgical incision about the lateral aspect of the left ankle is

## 2022-02-18 ENCOUNTER — HOSPITAL ENCOUNTER (OUTPATIENT)
Dept: WOUND CARE | Age: 52
Discharge: HOME | End: 2022-02-18
Payer: MEDICARE

## 2022-02-18 VITALS
HEART RATE: 76 BPM | DIASTOLIC BLOOD PRESSURE: 58 MMHG | HEIGHT: 66 IN | TEMPERATURE: 96.4 F | SYSTOLIC BLOOD PRESSURE: 118 MMHG | BODY MASS INDEX: 24.91 KG/M2 | WEIGHT: 155 LBS | RESPIRATION RATE: 18 BRPM

## 2022-02-18 DIAGNOSIS — L97.922 TRAUMATIC ULCER OF LEFT LOWER LEG WITH FAT LAYER EXPOSED (HCC): Primary | ICD-10-CM

## 2022-02-18 PROCEDURE — 99213 OFFICE O/P EST LOW 20 MIN: CPT

## 2022-02-18 PROCEDURE — 6370000000 HC RX 637 (ALT 250 FOR IP): Performed by: PODIATRIST

## 2022-02-18 PROCEDURE — 99212 OFFICE O/P EST SF 10 MIN: CPT | Performed by: SURGERY

## 2022-02-18 RX ORDER — LIDOCAINE HYDROCHLORIDE 20 MG/ML
JELLY TOPICAL ONCE
OUTPATIENT
Start: 2022-02-18 | End: 2022-02-18

## 2022-02-18 RX ORDER — LIDOCAINE 50 MG/G
OINTMENT TOPICAL ONCE
OUTPATIENT
Start: 2022-02-18 | End: 2022-02-18

## 2022-02-18 RX ORDER — LIDOCAINE 40 MG/G
CREAM TOPICAL ONCE
OUTPATIENT
Start: 2022-02-18 | End: 2022-02-18

## 2022-02-18 RX ORDER — BACITRACIN, NEOMYCIN, POLYMYXIN B 400; 3.5; 5 [USP'U]/G; MG/G; [USP'U]/G
OINTMENT TOPICAL ONCE
OUTPATIENT
Start: 2022-02-18 | End: 2022-02-18

## 2022-02-18 RX ORDER — LIDOCAINE HYDROCHLORIDE 40 MG/ML
SOLUTION TOPICAL ONCE
OUTPATIENT
Start: 2022-02-18 | End: 2022-02-18

## 2022-02-18 RX ORDER — GENTAMICIN SULFATE 1 MG/G
OINTMENT TOPICAL ONCE
OUTPATIENT
Start: 2022-02-18 | End: 2022-02-18

## 2022-02-18 RX ORDER — CLOBETASOL PROPIONATE 0.5 MG/G
OINTMENT TOPICAL ONCE
OUTPATIENT
Start: 2022-02-18 | End: 2022-02-18

## 2022-02-18 RX ORDER — GINSENG 100 MG
CAPSULE ORAL ONCE
OUTPATIENT
Start: 2022-02-18 | End: 2022-02-18

## 2022-02-18 RX ORDER — BETAMETHASONE DIPROPIONATE 0.05 %
OINTMENT (GRAM) TOPICAL ONCE
OUTPATIENT
Start: 2022-02-18 | End: 2022-02-18

## 2022-02-18 RX ORDER — LIDOCAINE HYDROCHLORIDE 40 MG/ML
SOLUTION TOPICAL ONCE
Status: COMPLETED | OUTPATIENT
Start: 2022-02-18 | End: 2022-02-18

## 2022-02-18 RX ORDER — BACITRACIN ZINC AND POLYMYXIN B SULFATE 500; 1000 [USP'U]/G; [USP'U]/G
OINTMENT TOPICAL ONCE
OUTPATIENT
Start: 2022-02-18 | End: 2022-02-18

## 2022-02-18 RX ADMIN — LIDOCAINE HYDROCHLORIDE: 40 SOLUTION TOPICAL at 08:40

## 2022-02-18 NOTE — PROGRESS NOTES
[facility]  History and Physical/Consultation  Podiatry    Referring Physician : Farheen Rosario MD  [facility]– [place][note 2] [address] RECORD NUMBER:  [id number]  AGE: 46 y.o. GENDER: female  : 1970  EPISODE DATE:  2022  Subjective:     Chief Complaint   Patient presents with    Wound Check     LEFT LEG         HISTORY of PRESENT ILLNESS HPI     Vishnu Galarza is a 46 y.o. female who presents today for wound/ulcer evaluation. History of Wound Context:  The patient has had a wound of left leg trauma which was first noted approximately months. This has been treated antibiotic. On their initial visit to the wound healing center, 20 ,  the patient has noted that the wound has not been improving. The patient has had similar previous wounds in the past.      Pt is not on abx at time of initial visit.       Wound/Ulcer Pain Timing/Severity: constant  Quality of pain: aching  Severity:  5 / 10   Modifying Factors: Pain worsens with walking  Associated Signs/Symptoms: edema, erythema and drainage    Ulcer Identification:  Ulcer Type: venous and traumatic  Contributing Factors: edema and venous stasis    Diabetic/Pressure/Non Pressure Ulcers onl y:  Ulcer: Non-Pressure ulcer, fat layer exposed    If patient has diabetic lower extremity wounds  Russell Classification of diabetic lower extremity wounds:    Grade Description   []  0 No open wound   []  1 Superficial ulcer involving the full skin thickness   []  2 Deep ulcer involves ligament, tendon, joint capsule, or fascia  No bone involvement or abscess presence   []  3 Deep Ulcer with abcess formation and/or osteomyelitis   []  4 Localized gangrene   []  5 Extensive gangrene of the foot     Wound: Crush Injury        21  Debrided, puraply  applied    21  Debrided, puraply applied      21  Debrided, puraply applied     22  Debrided, puraply applied 8 cm     22  Nushield applied, debrided    22  Debrided, contx tx and care, nushield applied     2-2-22  Debrided, apply snow    2-9-22  Debrided, healing well  218 2022  Wound looks clean, as the skin graft was applied last week, wound was not debrided  PAST MEDICAL HISTORY      Diagnosis Date    Hearing loss     congenital     Past Surgical History:   Procedure Laterality Date    FRACTURE SURGERY       Family History   Problem Relation Age of Onset   [facility] Hearing Loss Mother         deaf    Hearing Loss Father         deaf    Heart Disease Father         pacemaker     Social History     Tobacco Use    Smoking status: Former Smoker     Packs/day: 1.00     Years: 20.00     Pack years: 20.00     Types: Cigarettes     Quit date: 1/10/2007     Years since quitting: 15.1    Smokeless tobacco: Never Used   Vaping Use    Vaping Use: Never used   Substance Use Topics    Alcohol use: Yes     Comment: 3 Seagrams per month    Drug use: Yes     Types: Marijuana (Weed)     Comment: daily     No Known Allergies  Current Outpatient Medications on File Prior to Encounter   Medication Sig Dispense Refill    calcium-vitamin D (OSCAL) 250-125 MG-UNIT per tablet Take 1 tablet by mouth daily      Nutritional Supplements (Karlis Saint) POWD Take by mouth      acetaminophen (TYLENOL) 325 MG tablet Take 650 mg by mouth every 6 hours as needed for Pain       LYSINE PO Take by mouth      B Complex-C-E-Zn (STRESS B-COMPLEX/VIT C/ZINC) TABS Take 1 tablet by mouth daily 30 tablet 2    Biotin 1 MG CAPS Take by mouth       Multiple Vitamins-Minerals (THERAPEUTIC MULTIVITAMIN-MINERALS) tablet Take 1 tablet by mouth daily       gabapentin (NEURONTIN) 400 MG capsule  (Patient not taking: Reported on 2/11/2022)       No current facility-administered medications on file prior to encounter.        REVIEW OF SYSTEMS   ROS : All others Negative if blank [], Positive if [x]  General Vascular   [] Fevers [] Claudication   [] Chills [] Rest Pain   Skin Neurologic   [x] Tissue Loss [x] Lower extremity neuropathy 12-8-21  Debrided, cont tx and care, levaquin 500mg daily x 7 days  Objective:    BP (!) 118/58   Pulse 76   Temp 96.4 °F (35.8 °C) (Temporal)   Resp 18   Ht 5' 6\" (1.676 m)   Wt 155 lb (70.3 kg)   BMI 25.02 kg/m²   Wt Readings from Last 3 Encounters:   02/18/22 155 lb (70.3 kg)   02/09/22 155 lb (70.3 kg)   02/02/22 155 lb (70.3 kg)       PHYSICAL EXAM   CONSTITUTIONAL:   Awake, alert, cooperative   PSYCHIATRIC :  Oriented to time, place and person      normal insight to disease process  EXTREMITIES:   R LE Open wounds are not noted   Skin color is normal   Edema is  noted   Sensation intact to light touch   Palpation of the foot does cause pain   5/5 strength DF/PF  L LE Open wounds are noted   Skin color is abnormal with ulcer   Edema is  noted   Sensation intact to light touch   Palpation of the foot does cause pain   5/5 strength DF/PF  R dorsalis pedis 1 L dorsalis pedis 1   R posterior tibial 1 L posterior tibial 1     Assessment:     Problem List Items Addressed This Visit     Traumatic ulcer of left lower leg with fat layer exposed ([address] - Primary    Relevant Orders    Initiate Outpatient Wound Care Protocol          Pre Debridement Measurements:  Are located in the [facility]  Documentation Flow Sheet  Post Debridement Measurements:  Wound/Ulcer Descriptions are Pre Debridement except measurements:     Wound 12/01/21 Leg Left; Medial #1  trauma (Active)   Wound Image   01/05/22 0842   Wound Etiology Traumatic 12/01/21 0916   Dressing Status New dressing applied 02/09/22 1045   Wound Cleansed Cleansed with saline 02/09/22 1045   Dressing/Treatment Collagen with Ag;Silicone border 83/25/76 1045   Offloading for Diabetic Foot Ulcers No offloading required 02/09/22 1045   Wound Length (cm) 1.2 cm 02/18/22 0838   Wound Width (cm) 2.2 cm 02/18/22 0838   Wound Depth (cm) 0.1 cm 02/18/22 0838   Wound Surface Area (cm^2) 2.64 cm^2 02/18/22 0838   Change in Wound Size % (l*w) 12 02/18/22 0838   Wound Volume (cm^3) 0.264 cm^3 02/18/22 0838   Wound Healing % 56 02/18/22 0838   Post-Procedure Length (cm) 0.2 cm 02/09/22 1034   Post-Procedure Width (cm) 0.1 cm 02/09/22 1034   Post-Procedure Depth (cm) 0.1 cm 02/09/22 1034   Post-Procedure Surface Area (cm^2) 0.02 cm^2 02/09/22 1034   Post-Procedure Volume (cm^3) 0.002 cm^3 02/09/22 1034   Undermining Starts ___ O'Clock 10 12/17/21 0809   Undermining Ends___ O'Clock 12 12/17/21 0809   Undermining Maxium Distance (cm) 0.7 @ 11 12/17/21 0809   Wound Assessment Pink/red 02/18/22 0838   Drainage Amount Scant 02/18/22 0838   Drainage Description Serous 02/18/22 0838   Odor None 02/18/22 0838   Hillary-wound Assessment Intact 02/18/22 0838   Wound Thickness Description not for Pressure Injury Full thickness 12/01/21 0931   Number of days: 79          Procedure Note  Indications:  Based on my examination of this patient's wound(s)/ulcer(s) today, debridement is required to promote healing and evaluate the wound base. Performed by: Dorene Lane MD    Consent obtained:  Yes    Time out taken:  Yes    Pain Control: Anesthetic  Anesthetic: 4% Lidocaine Liquid Topical     Debridement: Wound not debrided today looks fairly clean      Response to treatment:  Well tolerated by patient. A culture was done. Plan:     Pt is not a smoker   - Discussed relationship of smoking and negative affects on wound healing   - Emphasized importance of tobacco avoidace/cessation   - Script for nicotine patch given to patient   - Information regarding support groups for smoking cessation given    In my professional opinion and based off the information that is available at this time this patient has appropriate indication for HBO Therapy: Maybe    Treatment Note please see attached Discharge Instructions    Written patient dismissal instructions given to patient and signed by patient or POA.          Discharge Instructions       Visit Discharge/Physician Orders    Discharge condition: Stable     Assessment of pain at discharge:  NONE     Anesthetic used: 4% lidocaine solution     Discharge to: Home     Left via:Private automobile     Accompanied by: accompanied by self     ECF/[facility]:   [facility]     Dressing Orders:  CLEAN LEFT LEG ULCER WITH NORMAL SALINE. APPLY SALINE MOIST MARAL INSIDE WOUND BED, COVER WITH BORDERED GAUZE PATCH AND CHANGE EVERY OTHER DAY. WEAR SPANDAGRIP TO LEFT LEG DURING THE DAY     Treatment Orders:       ARTERIAL STUDY FOR February 9TH Reviewed        [facility] followup visit ___________One week with Dr. Winters__________________  (Please note your next appointment above and if you are unable to keep, kindly give a 24 hour notice. Thank you.)    Physician signature:__________________________      If you experience any of the following, please call the [facility] during business hours:    * Increase in Pain  * Temperature over 101  * Increase in drainage from your wound  * Drainage with a foul odor  * Bleeding  * Increase in swelling  * Need for compression bandage changes due to slippage, breakthrough drainage. If you need medical attention outside of the business hours of the [facility] please contact your PCP or go to the nearest emergency room.         Electronically signed by Chad Emmanuel MD on 2/18/2022 at 9:26 AM

## 2022-02-25 ENCOUNTER — HOSPITAL ENCOUNTER (OUTPATIENT)
Dept: WOUND CARE | Age: 52
Discharge: HOME | End: 2022-02-25

## 2022-12-29 DIAGNOSIS — Z00.00 ANNUAL PHYSICAL EXAM: ICD-10-CM

## 2022-12-29 DIAGNOSIS — Z00.00 ANNUAL PHYSICAL EXAM: Primary | ICD-10-CM

## 2022-12-29 LAB
ALBUMIN SERPL-MCNC: 4.4 G/DL (ref 3.5–5.2)
ALP BLD-CCNC: 62 U/L (ref 35–104)
ALT SERPL-CCNC: 13 U/L (ref 0–32)
ANION GAP SERPL CALCULATED.3IONS-SCNC: 12 MMOL/L (ref 7–16)
AST SERPL-CCNC: 26 U/L (ref 0–31)
BASOPHILS ABSOLUTE: 0.06 E9/L (ref 0–0.2)
BASOPHILS RELATIVE PERCENT: 1 % (ref 0–2)
BILIRUB SERPL-MCNC: 0.5 MG/DL (ref 0–1.2)
BUN BLDV-MCNC: 14 MG/DL (ref 6–20)
CALCIUM SERPL-MCNC: 9.9 MG/DL (ref 8.6–10.2)
CHLORIDE BLD-SCNC: 103 MMOL/L (ref 98–107)
CHOLESTEROL, TOTAL: 178 MG/DL (ref 0–199)
CO2: 25 MMOL/L (ref 22–29)
CREAT SERPL-MCNC: 0.8 MG/DL (ref 0.5–1)
EOSINOPHILS ABSOLUTE: 0.17 E9/L (ref 0.05–0.5)
EOSINOPHILS RELATIVE PERCENT: 2.8 % (ref 0–6)
GFR SERPL CREATININE-BSD FRML MDRD: >60 ML/MIN/1.73
GLUCOSE BLD-MCNC: 72 MG/DL (ref 74–99)
HCT VFR BLD CALC: 42.5 % (ref 34–48)
HDLC SERPL-MCNC: 55 MG/DL
HEMOGLOBIN: 14.8 G/DL (ref 11.5–15.5)
IMMATURE GRANULOCYTES #: 0.01 E9/L
IMMATURE GRANULOCYTES %: 0.2 % (ref 0–5)
LDL CHOLESTEROL CALCULATED: 109 MG/DL (ref 0–99)
LYMPHOCYTES ABSOLUTE: 1.69 E9/L (ref 1.5–4)
LYMPHOCYTES RELATIVE PERCENT: 27.8 % (ref 20–42)
MCH RBC QN AUTO: 30.9 PG (ref 26–35)
MCHC RBC AUTO-ENTMCNC: 34.8 % (ref 32–34.5)
MCV RBC AUTO: 88.7 FL (ref 80–99.9)
MONOCYTES ABSOLUTE: 0.54 E9/L (ref 0.1–0.95)
MONOCYTES RELATIVE PERCENT: 8.9 % (ref 2–12)
NEUTROPHILS ABSOLUTE: 3.6 E9/L (ref 1.8–7.3)
NEUTROPHILS RELATIVE PERCENT: 59.3 % (ref 43–80)
PDW BLD-RTO: 12.5 FL (ref 11.5–15)
PLATELET # BLD: 292 E9/L (ref 130–450)
PMV BLD AUTO: 9.9 FL (ref 7–12)
POTASSIUM SERPL-SCNC: 4.5 MMOL/L (ref 3.5–5)
RBC # BLD: 4.79 E12/L (ref 3.5–5.5)
SODIUM BLD-SCNC: 140 MMOL/L (ref 132–146)
TOTAL PROTEIN: 7.4 G/DL (ref 6.4–8.3)
TRIGL SERPL-MCNC: 69 MG/DL (ref 0–149)
TSH SERPL DL<=0.05 MIU/L-ACNC: 2.13 UIU/ML (ref 0.27–4.2)
VLDLC SERPL CALC-MCNC: 14 MG/DL
WBC # BLD: 6.1 E9/L (ref 4.5–11.5)

## 2023-02-02 ENCOUNTER — OFFICE VISIT (OUTPATIENT)
Dept: FAMILY MEDICINE CLINIC | Age: 53
End: 2023-02-02

## 2023-02-02 VITALS
BODY MASS INDEX: 25.25 KG/M2 | DIASTOLIC BLOOD PRESSURE: 73 MMHG | RESPIRATION RATE: 18 BRPM | TEMPERATURE: 97.3 F | WEIGHT: 160.9 LBS | OXYGEN SATURATION: 97 % | SYSTOLIC BLOOD PRESSURE: 109 MMHG | HEART RATE: 70 BPM | HEIGHT: 67 IN

## 2023-02-02 DIAGNOSIS — Z12.11 SCREEN FOR COLON CANCER: ICD-10-CM

## 2023-02-02 DIAGNOSIS — Z00.00 MEDICARE ANNUAL WELLNESS VISIT, SUBSEQUENT: Primary | ICD-10-CM

## 2023-02-02 SDOH — ECONOMIC STABILITY: FOOD INSECURITY: WITHIN THE PAST 12 MONTHS, THE FOOD YOU BOUGHT JUST DIDN'T LAST AND YOU DIDN'T HAVE MONEY TO GET MORE.: NEVER TRUE

## 2023-02-02 SDOH — ECONOMIC STABILITY: INCOME INSECURITY: HOW HARD IS IT FOR YOU TO PAY FOR THE VERY BASICS LIKE FOOD, HOUSING, MEDICAL CARE, AND HEATING?: NOT HARD AT ALL

## 2023-02-02 SDOH — ECONOMIC STABILITY: FOOD INSECURITY: WITHIN THE PAST 12 MONTHS, YOU WORRIED THAT YOUR FOOD WOULD RUN OUT BEFORE YOU GOT MONEY TO BUY MORE.: NEVER TRUE

## 2023-02-02 SDOH — ECONOMIC STABILITY: HOUSING INSECURITY
IN THE LAST 12 MONTHS, WAS THERE A TIME WHEN YOU DID NOT HAVE A STEADY PLACE TO SLEEP OR SLEPT IN A SHELTER (INCLUDING NOW)?: NO

## 2023-02-02 ASSESSMENT — PATIENT HEALTH QUESTIONNAIRE - PHQ9
SUM OF ALL RESPONSES TO PHQ QUESTIONS 1-9: 0
SUM OF ALL RESPONSES TO PHQ9 QUESTIONS 1 & 2: 0
SUM OF ALL RESPONSES TO PHQ QUESTIONS 1-9: 0
SUM OF ALL RESPONSES TO PHQ QUESTIONS 1-9: 0
1. LITTLE INTEREST OR PLEASURE IN DOING THINGS: 0
2. FEELING DOWN, DEPRESSED OR HOPELESS: 0
SUM OF ALL RESPONSES TO PHQ QUESTIONS 1-9: 0

## 2023-02-02 ASSESSMENT — LIFESTYLE VARIABLES
HOW MANY STANDARD DRINKS CONTAINING ALCOHOL DO YOU HAVE ON A TYPICAL DAY: 1 OR 2
HOW OFTEN DO YOU HAVE A DRINK CONTAINING ALCOHOL: MONTHLY OR LESS

## 2023-02-02 NOTE — PATIENT INSTRUCTIONS
Substance Use Disorder: Care Instructions  Overview     You can improve your life and health by stopping your use of alcohol or drugs. When you don't drink or use drugs, you may feel and sleep better. You may get along better with your family, friends, and coworkers. There are medicines and programs that can help with substance use disorder. How can you care for yourself at home? If you have been given medicine to help keep you sober or reduce your cravings, be sure to take it exactly as prescribed. Talk to your doctor about programs that can help you stop using drugs or drinking alcohol. Do not tempt yourself by keeping alcohol or drugs in your home. Learn how to say no when other people drink or use drugs. Or don't spend time with people who drink or use drugs. Use the time and money spent on drinking or drugs to do something fun with your family or friends. Preventing a relapse  Do not drink alcohol or use drugs at all. Using any amount of alcohol or drugs greatly increases your risk for relapse. Seek help from organizations such as Alcoholics Anonymous, Narcotics Anonymous, or treatment facilities if you feel the need to drink alcohol or use drugs again. Remember that recovery is a lifelong process. Stay away from situations, friends, or places that may lead you to drink or use drugs. Have a plan to spot and deal with relapse. Learn to recognize changes in your thinking that lead you to drink or use drugs. These are warning signs. Get help before you start to drink or use drugs again. Get help as soon as you can if you relapse. Some people make a plan with another person that outlines what they want that person to do for them if they relapse. The plan usually includes how to handle the relapse and who to notify in case of relapse. Don't give up. Remember that a relapse does not mean that you have failed. Use the experience to learn the triggers that lead you to drink or use drugs.  Then quit again. Many people have several relapses before they are able to quit for good. Follow-up care is a key part of your treatment and safety. Be sure to make and go to all appointments, and call your doctor if you are having problems. It's also a good idea to know your test results and keep a list of the medicines you take. When should you call for help? Call 911  anytime you think you may need emergency care. For example, call if:    You feel you cannot stop from hurting yourself or someone else. Call your doctor now or seek immediate medical care if:    You have serious withdrawal symptoms, such as confusion, hallucinations, severe trembling, or seizures. Watch closely for changes in your health, and be sure to contact your doctor if:    You have a relapse.     You need more help or support to stop. Where can you learn more? Go to http://[employer].paula.com/ and enter H573 to learn more about \"Substance Use Disorder: Care Instructions. \"  Current as of: August 2, 2022               Content Version: 13.5  © 2006-2022 Healthwise, [employer]. Care instructions adapted under license by [facility] ([facility]). If you have questions about a medical condition or this instruction, always ask your healthcare professional. [place] [address] any warranty or liability for your use of this information. Learning About Emotional Support  When do you need emotional support? You might find getting support from others helpful when you have a long-term health problem. Often people feel alone, confused, or scared when coping with an illness. But you aren't alone. Other people are going through the same thing you are and know how you feel. Talking with others about your feelings can help you feel better. Your family and friends can give you support. So can your doctor, a support group, or a Restorationist. If you have a support network, you will not feel as alone.  You will learn new ways to deal with your situation, and you may try harder to overcome it.  Where you can get support  Family and friends: They can help you cope by giving you comfort and encouragement.  Counseling: Professional counseling can help you cope with situations that interfere with your life and cause stress. Counseling can help you understand and deal with your illness.  Your doctor: Find a doctor you trust and feel comfortable with. Be open and honest about your fears and concerns. Your doctor can help you get the right medical treatments, including counseling.  Spiritual or Nondenominational groups: They can provide comfort and may be able to help you find counseling or other social support services.  Social groups: They can help you meet new people and get involved in activities you enjoy.  Community support groups: In a support group, you can talk to others who have dealt with the same problems or illness as you. You can encourage one another and learn ways to cope with tough emotions.  How to find a support group  Ask your doctor, counselor, or other health professional for suggestions.  Contact your local Yazdanism, Orthodox, Amish, or other Nondenominational group.  Ask your family and friends.  Ask people who have the same health concerns.  Go online. Forums and blogs let you read messages from others and leave your own messages. You can exchange stories, vent your frustrations, and ask and answer questions.  Contact a city, state, or national group that provides support for your health concerns. Your library or community center may have a list of these groups. Or you can look for information online.  Look for a support group that works for you. Ask yourself if you prefer structure and would like a , or if you would like a less formal group. Do you prefer face-to-face meetings? Or do you feel more secure in online chat rooms or forums?  Supportive relationships  A supportive relationship includes emotional support such as love, trust,  and understanding, as well as advice and concrete help, such as help managing your time. Reach out to others  Family and friends can help you. Ask them to:  Listen to you and give you encouragement. This can keep you from feeling hopeless or alone. Help with small daily tasks or with bigger problems. A helping hand can keep you from feeling overwhelmed. Help you manage a health problem. For example, ask them to go to doctor visits with you. Your loved ones can offer support by being involved in your medical care. Respect your relationships  A good relationship is also a two-way street. You count on help from others, but they also count on you. Know your friends' limits. You don't have to see or call your friends every day. If you are going through a rough patch, ask friends if you can contact them outside of the usual boundaries. Don't always complain or talk about yourself. Know when it's time to stop talking and listen or just enjoy your friend's company. Know that good friends can be a bad influence. For example, if a friend encourages you to drink when you know it will harm you, you may want to end the friendship. Where can you learn more? Go to http://www.woods.com/ and enter G092 to learn more about \"Learning About Emotional Support. \"  Current as of: February 9, 2022               Content Version: 13.5  © 4476-1892 Healthwise, Incorporated. Care instructions adapted under license by [facility] [place] ([facility]). If you have questions about a medical condition or this instruction, always ask your healthcare professional. [address] any warranty or liability for your use of this information. Advance Directives: Care Instructions  Overview  An advance directive is a legal way to state your wishes at the end of your life. It tells your family and your doctor what to do if you can't say what you want. There are two main types of advance directives.  You can change them any time your wishes change. Living will. This form tells your family and your doctor your wishes about life support and other treatment. The form is also called a declaration. Medical power of . This form lets you name a person to make treatment decisions for you when you can't speak for yourself. This person is called a health care agent (health care proxy, health care surrogate). The form is also called a durable power of  for health care. If you do not have an advance directive, decisions about your medical care may be made by a family member, or by a doctor or a  who doesn't know you. It may help to think of an advance directive as a gift to the people who care for you. If you have one, they won't have to make tough decisions by themselves. For more information, including forms for your state, see the [address] website (www.caringinfo.org/planning/advance-directives/). Follow-up care is a key part of your treatment and safety. Be sure to make and go to all appointments, and call your doctor if you are having problems. It's also a good idea to know your test results and keep a list of the medicines you take. What should you include in an advance directive? Many states have a unique advance directive form. (It may ask you to address specific issues.) Or you might use a universal form that's approved by many states. If your form doesn't tell you what to address, it may be hard to know what to include in your advance directive. Use the questions below to help you get started. Who do you want to make decisions about your medical care if you are not able to? What life-support measures do you want if you have a serious illness that gets worse over time or can't be cured? What are you most afraid of that might happen? (Maybe you're afraid of having pain, losing your independence, or being kept alive by machines.)  Where would you prefer to die? (Your home?  A hospital? A nursing home?)  Do you want to donate your organs when you die? Do you want certain Taoism practices performed before you die? When should you call for help? Be sure to contact your doctor if you have any questions. Where can you learn more? Go to http://www.paula.com/ and enter R264 to learn more about \"Advance Directives: Care Instructions. \"  Current as of: June 16, 2022               Content Version: 13.5  © 2006-2022 [employer]. Care instructions adapted under license by [facility] ([facility]). If you have questions about a medical condition or this instruction, always ask your healthcare professional. [address] any warranty or liability for your use of this information. A Healthy Heart: Care Instructions  Your Care Instructions     Coronary artery disease, also called heart disease, occurs when a substance called plaque builds up in the vessels that supply oxygen-rich blood to your heart muscle. This can narrow the blood vessels and reduce blood flow. A heart attack happens when blood flow is completely blocked. A high-fat diet, smoking, and other factors increase the risk of heart disease. Your doctor has found that you have a chance of having heart disease. You can do lots of things to keep your heart healthy. It may not be easy, but you can change your diet, exercise more, and quit smoking. These steps really work to lower your chance of heart disease. Follow-up care is a key part of your treatment and safety. Be sure to make and go to all appointments, and call your doctor if you are having problems. It's also a good idea to know your test results and keep a list of the medicines you take. How can you care for yourself at home? Diet    Use less salt when you cook and eat. This helps lower your blood pressure. Taste food before salting. Add only a little salt when you think you need it.  With time, your taste buds will adjust to less salt.     Eat fewer snack items, fast foods, canned soups, and other high-salt, high-fat, processed foods.     Read food labels and try to avoid saturated and trans fats. They increase your risk of heart disease by raising cholesterol levels.     Limit the amount of solid fat-butter, margarine, and shortening-you eat. Use olive, peanut, or canola oil when you cook. Bake, broil, and steam foods instead of frying them.     Eat a variety of fruit and vegetables every day. Dark green, deep orange, red, or yellow fruits and vegetables are especially good for you. Examples include spinach, carrots, peaches, and berries.     Foods high in fiber can reduce your cholesterol and provide important vitamins and minerals. High-fiber foods include whole-grain cereals and breads, oatmeal, beans, brown rice, citrus fruits, and apples.     Eat lean proteins. Heart-healthy proteins include seafood, lean meats and poultry, eggs, beans, peas, nuts, seeds, and soy products.     Limit drinks and foods with added sugar. These include candy, desserts, and soda pop. Lifestyle changes    If your doctor recommends it, get more exercise. Walking is a good choice. Bit by bit, increase the amount you walk every day. Try for at least 30 minutes on most days of the week. You also may want to swim, bike, or do other activities.     Do not smoke. If you need help quitting, talk to your doctor about stop-smoking programs and medicines. These can increase your chances of quitting for good. Quitting smoking may be the most important step you can take to protect your heart. It is never too late to quit.     Limit alcohol to 2 drinks a day for men and 1 drink a day for women. Too much alcohol can cause health problems.     Manage other health problems such as diabetes, high blood pressure, and high cholesterol. If you think you may have a problem with alcohol or drug use, talk to your doctor. Medicines    Take your medicines exactly as prescribed.  Call your doctor if you think you are having a problem with your medicine.     If your doctor recommends aspirin, take the amount directed each day. Make sure you take aspirin and not another kind of pain reliever, such as acetaminophen (Tylenol). When should you call for help? Call 911 if you have symptoms of a heart attack. These may include:    Chest pain or pressure, or a strange feeling in the chest.     Sweating.     Shortness of breath.     Pain, pressure, or a strange feeling in the back, neck, jaw, or upper belly or in one or both shoulders or arms.     Lightheadedness or sudden weakness.     A fast or irregular heartbeat. After you call 911, the  may tell you to chew 1 adult-strength or 2 to 4 low-dose aspirin. Wait for an ambulance. Do not try to drive yourself. Watch closely for changes in your health, and be sure to contact your doctor if you have any problems. Where can you learn more? Go to http://www.paula.com/ and enter F075 to learn more about \"A Healthy Heart: Care Instructions. \"  Current as of: September 7, 2022               Content Version: 13.5  © 0540-0473 Healthwise, [employer]. Care instructions adapted under license by [facility] [place] ([facility]). If you have questions about a medical condition or this instruction, always ask your healthcare professional. [address] any warranty or liability for your use of this information. Personalized Preventive Plan for [facility] - 2/2/2023  Medicare offers a range of preventive health benefits. Some of the tests and screenings are paid in full while other may be subject to a deductible, co-insurance, and/or copay. Some of these benefits include a comprehensive review of your medical history including lifestyle, illnesses that may run in your family, and various assessments and screenings as appropriate.     After reviewing your medical record and screening and assessments performed today your provider may have ordered immunizations, labs, imaging, and/or referrals for you. A list of these orders (if applicable) as well as your Preventive Care list are included within your After Visit Summary for your review. Other Preventive Recommendations:    A preventive eye exam performed by an eye specialist is recommended every 1-2 years to screen for glaucoma; cataracts, macular degeneration, and other eye disorders. A preventive dental visit is recommended every 6 months. Try to get at least 150 minutes of exercise per week or 10,000 steps per day on a pedometer . Order or download the FREE \"Exercise & Physical Activity: Your Everyday Guide\" from [employer]. Call 9-421.759.4421 or search The [employer] A[employer] online. You need 0767-6081 mg of calcium and 9425-5300 IU of vitamin D per day. It is possible to meet your calcium requirement with diet alone, but a vitamin D supplement is usually necessary to meet this goal.  When exposed to the sun, use a sunscreen that protects against both UVA and UVB radiation with an SPF of 30 or greater. Reapply every 2 to 3 hours or after sweating, drying off with a towel, or swimming. Always wear a seat belt when traveling in a car. Always wear a helmet when riding a bicycle or motorcycle.
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
## 2023-03-16 ENCOUNTER — TELEPHONE (OUTPATIENT)
Dept: ORTHOPEDIC SURGERY | Age: 53
End: 2023-03-16

## 2023-03-16 NOTE — TELEPHONE ENCOUNTER
Call rec'd from pt requesting an appt for leg. Call back to pt lvm requesting why appt is needed, what area & which leg?

## 2023-03-16 NOTE — TELEPHONE ENCOUNTER
Patient had surgery a while ago, it would be unusual for a stitch to spit out this far out. You can have her take a picture of it and text it to my phone. Then we will call her and tell her what to do.

## 2023-03-16 NOTE — TELEPHONE ENCOUNTER
Call back from pt lt leg, swelling went down, there is a \"string\". She tried to pull it out by tugging at it. Requesting an appt. Routing to provider for scheduling rec. Advised not to pull it out.

## 2023-03-17 NOTE — TELEPHONE ENCOUNTER
Call to pt advised Patient had surgery a while ago, it would be unusual for a stitch to spit out this far out. You can have her take a picture of it and text it to my phone. Then we will call her and tell her what to do. Pt verbalized understanding and is in agreement with the plan.

## 2023-03-17 NOTE — TELEPHONE ENCOUNTER
Rec'd photo of stitch, per FA bring pt in Monday for possible removal .    Call to pt scheduled appt.    Future Appointments   Date Time Provider [place]   3/20/2023 12:30 PM SE Jayesh ORTHO APC [facility]   3/30/2023 11:10 AM Neha Andrade MD BD GEN SURG [facility]   2/6/2024  8:00 AM MD Shayne Fuentes [facility]

## 2023-03-20 ENCOUNTER — NURSE ONLY (OUTPATIENT)
Dept: ORTHOPEDIC SURGERY | Age: 53
End: 2023-03-20
Payer: MEDICARE

## 2023-03-20 DIAGNOSIS — S82.115D NONDISPLACED FRACTURE OF LEFT TIBIAL SPINE, SUBSEQUENT ENCOUNTER FOR CLOSED FRACTURE WITH ROUTINE HEALING: ICD-10-CM

## 2023-03-20 DIAGNOSIS — S82.892D CLOSED LEFT ANKLE FRACTURE, WITH ROUTINE HEALING, SUBSEQUENT ENCOUNTER: Primary | ICD-10-CM

## 2023-03-20 DIAGNOSIS — T81.89XA RETAINED SUTURE, INITIAL ENCOUNTER: ICD-10-CM

## 2023-03-20 DIAGNOSIS — Z18.9 RETAINED SUTURE, INITIAL ENCOUNTER: ICD-10-CM

## 2023-03-20 PROCEDURE — 99213 OFFICE O/P EST LOW 20 MIN: CPT

## 2023-03-20 PROCEDURE — 99212 OFFICE O/P EST SF 10 MIN: CPT | Performed by: PHYSICIAN ASSISTANT

## 2023-03-20 NOTE — PROGRESS NOTES
this visit. XR:   Not taken today. Assessment:   Diagnosis Orders   1. Closed left ankle fracture, with routine healing, subsequent encounter        2. Nondisplaced fracture of left tibial spine, subsequent encounter for closed fracture with routine healing        3. Retained suture, initial encounter          Plan:  Plan of care discussed in detail, all questions sought and answered to patients satisfaction at this time. Attempt was made to remove likely retained suture from left leg wound. Unable to completely remove suture remnant. Patient states that it is not really bothering her that much. If it does start to bother her, discussed possible surgical procedure involving lancing the skin to remove the suture. Patient would like to avoid further surgical intervention at this time which we feel is reasonable. She will follow-up as needed and was advised to contact the office if she has any questions or concerns. Electronically signed by RAFAT Leahy on 3/20/2023 at 1:10 PM  Note: This report was completed using [employer] voiced recognition software. Every effort has been made to ensure accuracy; however, inadvertent computerized transcription errors may be present.

## 2023-03-20 NOTE — PATIENT INSTRUCTIONS
Attempt was made to remove likely retained suture from left leg wound. Unable to completely remove suture remnant. Patient states that it is not really bothering her that much. If it does start to bother her, discussed possible surgical procedure involving lancing the skin to remove the suture. Patient would like to avoid further surgical intervention at this time which we feel is reasonable. She will follow-up as needed and was advised to contact the office if she has any questions or concerns.

## 2023-03-30 ENCOUNTER — OFFICE VISIT (OUTPATIENT)
Dept: SURGERY | Age: 53
End: 2023-03-30

## 2023-03-30 VITALS
SYSTOLIC BLOOD PRESSURE: 104 MMHG | TEMPERATURE: 97.2 F | RESPIRATION RATE: 18 BRPM | HEIGHT: 67 IN | BODY MASS INDEX: 25.11 KG/M2 | OXYGEN SATURATION: 98 % | DIASTOLIC BLOOD PRESSURE: 66 MMHG | WEIGHT: 160 LBS | HEART RATE: 72 BPM

## 2023-03-30 DIAGNOSIS — Z12.11 ENCOUNTER FOR SCREENING COLONOSCOPY: Primary | ICD-10-CM

## 2023-03-30 PROCEDURE — 99024 POSTOP FOLLOW-UP VISIT: CPT | Performed by: SURGERY

## 2023-03-30 RX ORDER — SODIUM CHLORIDE 9 MG/ML
INJECTION, SOLUTION INTRAVENOUS CONTINUOUS
OUTPATIENT
Start: 2023-03-30

## 2023-03-30 NOTE — PROGRESS NOTES
Substance Use Topics    Alcohol use: Yes     Comment: 3 Seagrams per month        Family History:   Family History   Problem Relation Age of Onset    Hearing Loss Mother         deaf    Hearing Loss Father         deaf    Heart Disease Father         pacemaker       REVIEW OF SYSTEMS:    Constitutional: negative  Eyes: negative  Ears, nose, mouth, throat, and face: negative  Respiratory: negative  Cardiovascular: negative  Gastrointestinal: as in HPI  Genitourinary:negative  Integument/breast: negative  Hematologic/lymphatic: negative  Musculoskeletal:negative  Neurological: negative  Allergic/Immunologic: negative    PHYSICAL EXAM   /66   Pulse 72   Temp 97.2 °F (36.2 °C) (Infrared)   Resp 18   Ht 5' 7\" (1.702 m)   Wt 160 lb (72.6 kg)   SpO2 98%   BMI 25.06 kg/m²     General appearance: alert, cooperative and in no acute distress. Eyes: Grossly normal   Lungs: normal work of breathing  Heart: regular rate  Abdomen:  soft, non-tender, non-distended  Skin: No skin abnormalities  Neurologic: Alert and oriented x 3. Grossly normal  Musculoskeletal: No edema. ASSESSMENT AND PLAN:     Zoe Mead is an 48 y.o. female who presents for a colonoscopy for screening     I will set the patient up for a colonoscopy, possible biopsy, possible polypectomy. I explained the risks including but not limited to bleeding, perforation leading to possible surgery, or infection. The benefits, alternatives, and potential complications associated with the above procedure to be performed and transfusions when applicable with the patient/responsible person prior to the procedure.        Physician Signature: Electronically signed by Sebastien Read MD, General Surgery    Send copy of H&P to PCP, Richard Delgado MD and referring physician, Gurjit Ruby MD

## 2023-03-30 NOTE — PATIENT INSTRUCTIONS
Tiffany Cummins MD, FACS    Preoperative Instructions    Please read the following information very carefully. It contains information that is necessary to best prepare you for your upcoming procedure. Make arrangements for a  to take you to and from your procedure. YOU MUST HAVE SOMEONE DRIVE YOU HOME - this cannot be a taxi or public transportation. You will not be administered anesthesia without someone to go home and be at home with you that day. Nothing to eat or drink after midnight the night before your procedure. Follow your bowel prep instructions if you have them for this procedure. 3 days prior to your procedure: Stop taking blood thinners like Coumadin or Plavix or Xarelto. 5 days prior to your procedure: Stop taking Aspirin or Aspirin containing products. If you cannot stop any of these medications prior to your procedure, please contact our office. Medications morning of procedure: Only heart, breathing, blood pressure, and seizure medications are permitted on the morning of your procedure. These medications can be taken with a sip of water. IF YOU ARE UNABLE TO KEEP THE ABOVE SCHEDULED PROCEDURE, YOU MUST NOTIFY DR. ZALDIVAR'S OFFICE [phone]. NOT THE FACILITY. NO CHEWING GUM OR CHEWING TOBACCO AFTER MIDNIGHT ON DAY OF PROCEDURE.    YOU MUST HAVE TRANSPORTATION TO AND FROM THE FACILITY. What is a colonoscopy? A colonoscopy is a test that lets a doctor look inside your colon. The doctor uses a thin, lighted tube called a colonoscope to look for problems. These include small growths called polyps, cancer, or bleeding. During the test, the doctor can take samples of tissue that can be checked for cancer or other problems. This is called a biopsy. The doctor can also take out polyps. Before the test, you will need to stop eating solid foods. You also will drink a liquid or take a tablet that cleans out your colon.  This helps your

## 2023-03-31 ENCOUNTER — TELEPHONE (OUTPATIENT)
Dept: SURGERY | Age: 53
End: 2023-03-31

## 2023-03-31 NOTE — TELEPHONE ENCOUNTER
Prior Authorization Form:      DEMOGRAPHICS:                     Patient Name:  Allyson Cook  Patient :  1970            Insurance:  Payor: Roxene Pound / Plan: [facility] MEDICARE COMPLETE / Product Type: *No Product type* /   Insurance ID Number:    Payer/Plan Subscr  Sex Relation Sub. Ins. ID Effective Group Num   1.  Adán Ann 1970 Female Self [id number]                                   [address] [address]         DIAGNOSIS & PROCEDURE:                       Procedure/Operation: COLONOSCOPY           CPT Code: 38809    Diagnosis:  SCREENING    ICD10 Code: Z12.11    Location:  [facility]    Surgeon:  Chandana Carroll    SCHEDULING INFORMATION:                          Date: 23    Time: 9AM              Anesthesia:  MAC/TIVA                                                       Status:  Outpatient        Special Comments:         Electronically signed by Janak Mercer MA on 3/31/2023 at 2:32 PM Janet Wild

## 2023-04-27 ENCOUNTER — TELEPHONE (OUTPATIENT)
Dept: FAMILY MEDICINE CLINIC | Age: 53
End: 2023-04-27

## 2023-04-28 ENCOUNTER — TELEPHONE (OUTPATIENT)
Dept: FAMILY MEDICINE CLINIC | Age: 53
End: 2023-04-28

## 2023-04-28 RX ORDER — KETOCONAZOLE 20 MG/G
CREAM TOPICAL
Qty: 30 G | Refills: 1 | Status: SHIPPED | OUTPATIENT
Start: 2023-04-28

## 2023-05-17 ENCOUNTER — ANESTHESIA EVENT (OUTPATIENT)
Dept: ENDOSCOPY | Age: 53
End: 2023-05-17
Payer: MEDICARE

## 2023-05-17 ENCOUNTER — ANESTHESIA (OUTPATIENT)
Dept: ENDOSCOPY | Age: 53
End: 2023-05-17
Payer: MEDICARE

## 2023-05-17 ENCOUNTER — HOSPITAL ENCOUNTER (OUTPATIENT)
Age: 53
Setting detail: OUTPATIENT SURGERY
Discharge: HOME | End: 2023-05-17
Attending: SURGERY | Admitting: SURGERY
Payer: MEDICARE

## 2023-05-17 VITALS
HEART RATE: 58 BPM | WEIGHT: 155 LBS | HEIGHT: 67 IN | BODY MASS INDEX: 24.33 KG/M2 | TEMPERATURE: 96.8 F | SYSTOLIC BLOOD PRESSURE: 155 MMHG | OXYGEN SATURATION: 98 % | DIASTOLIC BLOOD PRESSURE: 80 MMHG | RESPIRATION RATE: 17 BRPM

## 2023-05-17 DIAGNOSIS — Z12.11 SPECIAL SCREENING FOR MALIGNANT NEOPLASMS, COLON: ICD-10-CM

## 2023-05-17 LAB
HCG, URINE, POC: NEGATIVE
Lab: NORMAL
NEGATIVE QC PASS/FAIL: NORMAL
POSITIVE QC PASS/FAIL: NORMAL

## 2023-05-17 PROCEDURE — 45380 COLONOSCOPY AND BIOPSY: CPT | Performed by: SURGERY

## 2023-05-17 PROCEDURE — 3700000001 HC ADD 15 MINUTES (ANESTHESIA): Performed by: SURGERY

## 2023-05-17 PROCEDURE — 2709999900 HC NON-CHARGEABLE SUPPLY: Performed by: SURGERY

## 2023-05-17 PROCEDURE — 7100000010 HC PHASE II RECOVERY - FIRST 15 MIN: Performed by: SURGERY

## 2023-05-17 PROCEDURE — 2580000003 HC RX 258: Performed by: NURSE ANESTHETIST, CERTIFIED REGISTERED

## 2023-05-17 PROCEDURE — 3609010300 HC COLONOSCOPY W/BIOPSY SINGLE/MULTIPLE: Performed by: SURGERY

## 2023-05-17 PROCEDURE — 6360000002 HC RX W HCPCS: Performed by: NURSE ANESTHETIST, CERTIFIED REGISTERED

## 2023-05-17 PROCEDURE — 88305 TISSUE EXAM BY PATHOLOGIST: CPT

## 2023-05-17 PROCEDURE — 7100000011 HC PHASE II RECOVERY - ADDTL 15 MIN: Performed by: SURGERY

## 2023-05-17 PROCEDURE — 3700000000 HC ANESTHESIA ATTENDED CARE: Performed by: SURGERY

## 2023-05-17 RX ORDER — SODIUM CHLORIDE 9 MG/ML
INJECTION, SOLUTION INTRAVENOUS CONTINUOUS
Status: DISCONTINUED | OUTPATIENT
Start: 2023-05-17 | End: 2023-05-17 | Stop reason: HOSPADM

## 2023-05-17 RX ORDER — SODIUM CHLORIDE 9 MG/ML
INJECTION, SOLUTION INTRAVENOUS CONTINUOUS PRN
Status: DISCONTINUED | OUTPATIENT
Start: 2023-05-17 | End: 2023-05-17 | Stop reason: SDUPTHER

## 2023-05-17 RX ORDER — PROPOFOL 10 MG/ML
INJECTION, EMULSION INTRAVENOUS PRN
Status: DISCONTINUED | OUTPATIENT
Start: 2023-05-17 | End: 2023-05-17 | Stop reason: SDUPTHER

## 2023-05-17 RX ADMIN — SODIUM CHLORIDE: 9 INJECTION, SOLUTION INTRAVENOUS at 08:30

## 2023-05-17 RX ADMIN — PROPOFOL 450 MG: 10 INJECTION, EMULSION INTRAVENOUS at 08:32

## 2023-05-17 ASSESSMENT — PAIN - FUNCTIONAL ASSESSMENT: PAIN_FUNCTIONAL_ASSESSMENT: 0-10

## 2023-05-17 ASSESSMENT — PAIN SCALES - GENERAL
PAINLEVEL_OUTOF10: 0
PAINLEVEL_OUTOF10: 0

## 2023-05-17 NOTE — ANESTHESIA POSTPROCEDURE EVALUATION
Department of Anesthesiology  Postprocedure Note    Patient: Mariana Oates  MRN: [id number]  YOB: 1970  Date of evaluation: 5/17/2023      Procedure Summary     Date: 05/17/23 Room / Location: [facility] / [facility]    Anesthesia Start: 0830 Anesthesia Stop: 1103    Procedure: COLONOSCOPY WITH BIOPSY Diagnosis:       Special screening for malignant neoplasms, colon      (Special screening for malignant neoplasms, colon [Z12.11])    Surgeons: Ramila Zavala MD Responsible Provider: Abhijit Crain DO    Anesthesia Type: MAC ASA Status: 1          Anesthesia Type: No value filed.     Thu Phase I: Thu Score: 10    Thu Phase II: Thu Score: 10      Anesthesia Post Evaluation    Patient location during evaluation: PACU  Patient participation: complete - patient participated  Level of consciousness: awake and alert  Airway patency: patent  Nausea & Vomiting: no nausea and no vomiting  Complications: no  Cardiovascular status: hemodynamically stable  Respiratory status: acceptable  Hydration status: euvolemic

## 2023-05-17 NOTE — H&P
Patient's office history and physical was reviewed. Patient examined. There has been no change in the patient's history and physical.      Physician Signature: Electronically signed by Dr. Dany Sifuentes Surgery History and Physical     Patient's Name/Date of Birth: Mariana Oates / 1970     Date: 5/17/23     PCP: Edi De Los Santos MD     Referring Physician:   Chanell Cruz MD  [phone]     CHIEF COMPLAINT:        Chief Complaint   Patient presents with    Colonoscopy            HISTORY OF PRESENT ILLNESS:     Mariana Oates is an 48 y.o. female who presents for a colonoscopy. The patient denies any symptoms. No nausea, vomiting, diarrhea, constipation. No changes in stool caliber. No bloody or black stools. No abdominal pain. No unintentional weight loss. No family history of colon cancer. Her mother had colon problems in the past and she had surgery but she is uncertain why. The patient has a known history of: no known risk factors. The patient has never had a colonoscopy before.       Past Medical History:   Past Medical History        Past Medical History:   Diagnosis Date    Hearing loss       congenital            Past Surgical History:   Past Surgical History         Past Surgical History:   Procedure Laterality Date    FRACTURE SURGERY        SHOULDER SURGERY                Allergies: Penicillins      Medications:   Current Facility-Administered Medications          Current Outpatient Medications   Medication Sig Dispense Refill    COLLAGEN PO Take by mouth        calcium-vitamin D (OSCAL) 250-125 MG-UNIT per tablet Take 1 tablet by mouth daily        acetaminophen (TYLENOL) 325 MG tablet Take 650 mg by mouth every 6 hours as needed for Pain         LYSINE PO Take by mouth        B Complex-C-E-Zn (STRESS B-COMPLEX/VIT C/ZINC) TABS Take 1 tablet by mouth daily 30 tablet 2    Biotin 1 MG CAPS Take by mouth         Multiple Vitamins-Minerals (THERAPEUTIC

## 2023-05-17 NOTE — DISCHARGE INSTRUCTIONS
[facility] Colonoscopy PROCEDURE DISCHARGE INSTRUCTIONS  You may be drowsy or lightheaded after receiving sedation or anesthesia. A responsible person should be with you for the next 24 hours. Please follow the instructions checked below:    DIET INSTRUCTIONS:  [x]Start with light diet and progress to your normal diet as you feel like eating. If you experience nausea or repeated episodes of vomiting which persist beyond 12-24 hours, notify your doctor. []Other     ACTIVITY INSTRUCTIONS:  [x]Rest today. Increase activity as tolerated    []No heavy lifting or strenuous activity     [x]No driving for today  []Other      MEDICATION INSTRUCTIONS:    []Prescriptions sent with you. Use as directed. When taking pain medications, you may experience dizziness or drowsiness. Do not drink alcohol or drive when taking these medications. [x]Continue preop medications                               Post-procedure Care   If any tissue was removed: It will be sent to a lab to be examined. It may take 1-2 weeks for results. The doctor will usually give an initial report after the scope is removed. Other tests may be recommended. A small amount of bleeding may occur during the first few days after the procedure. When you return home after the procedure, be sure to follow your doctor's instructions, which may include:   Resume medicines as instructed by your doctor. Resume normal diet, unless directed otherwise by your doctor. The sedative will make you drowsy. Avoid driving, operating machinery, or making important decisions for the rest of the day. Rest for the remainder of the day. After arriving home, contact your doctor if any of the following occurs:   Bleeding from your rectum, notify your doctor if you pass a teaspoonful of blood or more.    Black, tarry stools   Severe abdominal pain   Hard, swollen abdomen   Signs of infection, including fever or chills   Inability to pass gas or

## 2023-05-17 NOTE — OP NOTE
Operative Note: Colonoscopy    Suellen Jimenez     DATE OF PROCEDURE: 5/17/2023  SURGEON: Dr. Luzma Mock MD, M.D. PREOPERATIVE DIAGNOSES:    Screening    POSTOPERATIVE DIAGNOSES:  Ileocolic valve stenosis    SPECIMENS:  ID Type Source Tests Collected by Time Destination   A : TERMINAL ILEUM BIOPSY Tissue Tissue SURGICAL PATHOLOGY Joelle Maza MD 5/17/2023 8656    B : Ramond Vivienne Tissue Tissue SURGICAL PATHOLOGY Joelle Maza MD 5/17/2023 8662         OPERATION:   Colonoscopy to the terminal ileum with TI and ileocolic valve biopsies      ANESTHESIA: LMAC    COMPLICATIONS: None. BLOOD LOSS: Minimal    Procedure Note:    CONSENT AND INDICATIONS:  This is a 48y.o. year old female who is having the above. I have discussed with the patient and/or the patient representative the indication, alternatives, and the possible risks and/or complications of the planned procedure and the anesthesia methods. The patient and/or patient representative appear to understand and agree to proceed. OPERATIONS: Bowel prep was done yesterday until the bowels were clear. The patient was placed on the table and sedated by anesthesia. A rectal exam was performed and no mass was felt. A lubricated scope was passed into the rectum which looked normal.  The scope was passed all the way around through the sigmoid, descending, transverse and ascending colon to the cecum. The bowel prep was clear. No colon abnormalities were seen. The cecum was identified by the appendiceal orifice, ileocecal valve, and light reflex in the RLQ. The ileocolic valve appeared stenosed and the TI was intubated with some difficulty. The TI appeared normal and biopsies were taken. Biopsies were also taken of the valve. The scope was then slowly withdrawn, each area was examined again on the way out. The scope was retroflexed in the rectum and it was normal. The patient tolerated the procedure well.      PLAN: Follow up

## 2023-06-19 ENCOUNTER — OFFICE VISIT (OUTPATIENT)
Dept: SURGERY | Age: 53
End: 2023-06-19
Payer: MEDICARE

## 2023-06-19 VITALS
WEIGHT: 162 LBS | HEART RATE: 69 BPM | TEMPERATURE: 97 F | SYSTOLIC BLOOD PRESSURE: 124 MMHG | HEIGHT: 67 IN | OXYGEN SATURATION: 95 % | DIASTOLIC BLOOD PRESSURE: 79 MMHG | BODY MASS INDEX: 25.43 KG/M2

## 2023-06-19 DIAGNOSIS — K56.699: Primary | ICD-10-CM

## 2023-06-19 PROCEDURE — G8427 DOCREV CUR MEDS BY ELIG CLIN: HCPCS | Performed by: SURGERY

## 2023-06-19 PROCEDURE — G9899 SCRN MAM PERF RSLTS DOC: HCPCS | Performed by: SURGERY

## 2023-06-19 PROCEDURE — 1036F TOBACCO NON-USER: CPT | Performed by: SURGERY

## 2023-06-19 PROCEDURE — G8419 CALC BMI OUT NRM PARAM NOF/U: HCPCS | Performed by: SURGERY

## 2023-06-19 PROCEDURE — 99213 OFFICE O/P EST LOW 20 MIN: CPT | Performed by: SURGERY

## 2023-06-19 PROCEDURE — 3017F COLORECTAL CA SCREEN DOC REV: CPT | Performed by: SURGERY

## 2023-06-19 NOTE — PROGRESS NOTES
Progress Note - Follow up    Patient's Name/Date of Birth: Nicci Kinds / 1970    Date: 6/19/2023    PCP: Andrez Leonardo MD    Referring Physician:   Ivan Arroyo MD  [phone]    Chief Complaint   Patient presents with    Follow-up     Colonoscopy follow up        HPI:    The patient denies any issues with constipation or bloating. No n/v/d/c. No abdominal pain. Her mother had issues with her bowels and did need a colostomy bag. She is uncertain what it was. Patient's medications, allergies, past medical, surgical, social and family histories were reviewed and updated as appropriate. Review of Systems  Constitutional: negative  Respiratory: negative  Cardiovascular: negative  Gastrointestinal: as in hpi  Genitourinary:negative  Integument/breast: negative    Physical Exam:  /79   Pulse 69   Temp 97 °F (36.1 °C)   Ht 5' 7\" (1.702 m)   Wt 162 lb (73.5 kg)   SpO2 95%   BMI 25.37 kg/m²   General appearance: alert, cooperative and in no acute distress. Lungs: normal work of breathing  Heart: regular rate  Abdomen:  soft, nontender, nondistended  Musculoskeletal: symmetrical without edema. Skin: normal      Data Reviewed:   Pathology: Diagnosis:   A. Terminal ileum, biopsy: No pathologic alterations     B. Cecal valve, biopsy: Focal active colitis     Comment:   The differential diagnosis of focal active colitis includes bowel   preparation effect, acute self-limited colitis, infection, ischemia,   colitis associated with NSAIDs, among other possibilities. Impression/Plan:  48y.o. year old female with Ileocolic valve stenosis, focal active colitis on pathology    All available labs and pathology reviewed. All imaging independently reviewed and interpreted. All provider notes reviewed. The above was discussed with the patient at length. No need for further treatment, has no symptoms of Crohn's disease  Would manage conservatively for now.       Electronically by

## 2024-02-05 SDOH — HEALTH STABILITY: PHYSICAL HEALTH: ON AVERAGE, HOW MANY DAYS PER WEEK DO YOU ENGAGE IN MODERATE TO STRENUOUS EXERCISE (LIKE A BRISK WALK)?: 3 DAYS

## 2024-02-05 SDOH — HEALTH STABILITY: PHYSICAL HEALTH: ON AVERAGE, HOW MANY MINUTES DO YOU ENGAGE IN EXERCISE AT THIS LEVEL?: 60 MIN

## 2024-02-05 ASSESSMENT — PATIENT HEALTH QUESTIONNAIRE - PHQ9
SUM OF ALL RESPONSES TO PHQ9 QUESTIONS 1 & 2: 0
SUM OF ALL RESPONSES TO PHQ QUESTIONS 1-9: 0
SUM OF ALL RESPONSES TO PHQ QUESTIONS 1-9: 0
2. FEELING DOWN, DEPRESSED OR HOPELESS: 0
SUM OF ALL RESPONSES TO PHQ QUESTIONS 1-9: 0
1. LITTLE INTEREST OR PLEASURE IN DOING THINGS: 0
SUM OF ALL RESPONSES TO PHQ QUESTIONS 1-9: 0

## 2024-02-05 ASSESSMENT — LIFESTYLE VARIABLES
HOW OFTEN DO YOU HAVE SIX OR MORE DRINKS ON ONE OCCASION: 2
HOW OFTEN DURING THE LAST YEAR HAVE YOU FAILED TO DO WHAT WAS NORMALLY EXPECTED FROM YOU BECAUSE OF DRINKING: NEVER
HAS A RELATIVE, FRIEND, DOCTOR, OR ANOTHER HEALTH PROFESSIONAL EXPRESSED CONCERN ABOUT YOUR DRINKING OR SUGGESTED YOU CUT DOWN: 0
HOW OFTEN DURING THE LAST YEAR HAVE YOU HAD A FEELING OF GUILT OR REMORSE AFTER DRINKING: 0
HOW OFTEN DURING THE LAST YEAR HAVE YOU FOUND THAT YOU WERE NOT ABLE TO STOP DRINKING ONCE YOU HAD STARTED: NEVER
HAVE YOU OR SOMEONE ELSE BEEN INJURED AS A RESULT OF YOUR DRINKING: 0
HOW OFTEN DURING THE LAST YEAR HAVE YOU NEEDED AN ALCOHOLIC DRINK FIRST THING IN THE MORNING TO GET YOURSELF GOING AFTER A NIGHT OF HEAVY DRINKING: NEVER
HAVE YOU OR SOMEONE ELSE BEEN INJURED AS A RESULT OF YOUR DRINKING: NO
HOW MANY STANDARD DRINKS CONTAINING ALCOHOL DO YOU HAVE ON A TYPICAL DAY: 1
HOW OFTEN DO YOU HAVE A DRINK CONTAINING ALCOHOL: 2
HOW OFTEN DURING THE LAST YEAR HAVE YOU HAD A FEELING OF GUILT OR REMORSE AFTER DRINKING: NEVER
HOW OFTEN DURING THE LAST YEAR HAVE YOU FAILED TO DO WHAT WAS NORMALLY EXPECTED FROM YOU BECAUSE OF DRINKING: 0
HOW OFTEN DURING THE LAST YEAR HAVE YOU BEEN UNABLE TO REMEMBER WHAT HAPPENED THE NIGHT BEFORE BECAUSE YOU HAD BEEN DRINKING: NEVER
HOW OFTEN DURING THE LAST YEAR HAVE YOU NEEDED AN ALCOHOLIC DRINK FIRST THING IN THE MORNING TO GET YOURSELF GOING AFTER A NIGHT OF HEAVY DRINKING: 0
HOW OFTEN DURING THE LAST YEAR HAVE YOU BEEN UNABLE TO REMEMBER WHAT HAPPENED THE NIGHT BEFORE BECAUSE YOU HAD BEEN DRINKING: 0
HOW OFTEN DO YOU HAVE A DRINK CONTAINING ALCOHOL: MONTHLY OR LESS
HOW OFTEN DURING THE LAST YEAR HAVE YOU FOUND THAT YOU WERE NOT ABLE TO STOP DRINKING ONCE YOU HAD STARTED: 0
HAS A RELATIVE, FRIEND, DOCTOR, OR ANOTHER HEALTH PROFESSIONAL EXPRESSED CONCERN ABOUT YOUR DRINKING OR SUGGESTED YOU CUT DOWN: NO
HOW MANY STANDARD DRINKS CONTAINING ALCOHOL DO YOU HAVE ON A TYPICAL DAY: 1 OR 2

## 2024-02-06 ENCOUNTER — OFFICE VISIT (OUTPATIENT)
Dept: FAMILY MEDICINE CLINIC | Age: 54
End: 2024-02-06
Payer: MEDICARE

## 2024-02-06 VITALS
TEMPERATURE: 97.7 F | HEART RATE: 65 BPM | OXYGEN SATURATION: 97 % | DIASTOLIC BLOOD PRESSURE: 72 MMHG | SYSTOLIC BLOOD PRESSURE: 118 MMHG | RESPIRATION RATE: 16 BRPM | WEIGHT: 160.2 LBS | HEIGHT: 67 IN | BODY MASS INDEX: 25.15 KG/M2

## 2024-02-06 DIAGNOSIS — Z00.00 MEDICARE ANNUAL WELLNESS VISIT, SUBSEQUENT: Primary | ICD-10-CM

## 2024-02-06 DIAGNOSIS — E78.2 MIXED HYPERLIPIDEMIA: ICD-10-CM

## 2024-02-06 DIAGNOSIS — Z12.31 ENCOUNTER FOR SCREENING MAMMOGRAM FOR MALIGNANT NEOPLASM OF BREAST: ICD-10-CM

## 2024-02-06 PROBLEM — L97.922 TRAUMATIC ULCER OF LEFT LOWER LEG WITH FAT LAYER EXPOSED (HCC): Status: RESOLVED | Noted: 2021-12-01 | Resolved: 2024-02-06

## 2024-02-06 LAB
ALBUMIN SERPL-MCNC: 4.2 G/DL (ref 3.5–5.2)
ALP BLD-CCNC: 52 U/L (ref 35–104)
ALT SERPL-CCNC: 16 U/L (ref 0–32)
ANION GAP SERPL CALCULATED.3IONS-SCNC: 13 MMOL/L (ref 7–16)
AST SERPL-CCNC: 25 U/L (ref 0–31)
BILIRUB SERPL-MCNC: 0.5 MG/DL (ref 0–1.2)
BUN BLDV-MCNC: 12 MG/DL (ref 6–20)
CALCIUM SERPL-MCNC: 9.8 MG/DL (ref 8.6–10.2)
CHLORIDE BLD-SCNC: 101 MMOL/L (ref 98–107)
CHOLESTEROL: 181 MG/DL
CO2: 24 MMOL/L (ref 22–29)
CREAT SERPL-MCNC: 0.9 MG/DL (ref 0.5–1)
GFR SERPL CREATININE-BSD FRML MDRD: >60 ML/MIN/1.73M2
GLUCOSE BLD-MCNC: 79 MG/DL (ref 74–99)
HCT VFR BLD CALC: 42.4 % (ref 34–48)
HDLC SERPL-MCNC: 62 MG/DL
HEMOGLOBIN: 13.8 G/DL (ref 11.5–15.5)
LDL CHOLESTEROL: 109 MG/DL
MCH RBC QN AUTO: 30.5 PG (ref 26–35)
MCHC RBC AUTO-ENTMCNC: 32.5 G/DL (ref 32–34.5)
MCV RBC AUTO: 93.8 FL (ref 80–99.9)
PDW BLD-RTO: 13.3 % (ref 11.5–15)
PLATELET # BLD: 275 K/UL (ref 130–450)
PMV BLD AUTO: 9.8 FL (ref 7–12)
POTASSIUM SERPL-SCNC: 5.2 MMOL/L (ref 3.5–5)
RBC # BLD: 4.52 M/UL (ref 3.5–5.5)
SODIUM BLD-SCNC: 138 MMOL/L (ref 132–146)
TOTAL PROTEIN: 7.4 G/DL (ref 6.4–8.3)
TRIGL SERPL-MCNC: 49 MG/DL
TSH SERPL DL<=0.05 MIU/L-ACNC: 1.86 UIU/ML (ref 0.27–4.2)
VLDLC SERPL CALC-MCNC: 10 MG/DL
WBC # BLD: 6.5 K/UL (ref 4.5–11.5)

## 2024-02-06 PROCEDURE — G0439 PPPS, SUBSEQ VISIT: HCPCS | Performed by: FAMILY MEDICINE

## 2024-02-06 PROCEDURE — G8484 FLU IMMUNIZE NO ADMIN: HCPCS | Performed by: FAMILY MEDICINE

## 2024-02-06 PROCEDURE — 36415 COLL VENOUS BLD VENIPUNCTURE: CPT | Performed by: FAMILY MEDICINE

## 2024-02-06 PROCEDURE — 3017F COLORECTAL CA SCREEN DOC REV: CPT | Performed by: FAMILY MEDICINE

## 2024-02-06 SDOH — ECONOMIC STABILITY: INCOME INSECURITY: HOW HARD IS IT FOR YOU TO PAY FOR THE VERY BASICS LIKE FOOD, HOUSING, MEDICAL CARE, AND HEATING?: NOT HARD AT ALL

## 2024-02-06 SDOH — ECONOMIC STABILITY: FOOD INSECURITY: WITHIN THE PAST 12 MONTHS, YOU WORRIED THAT YOUR FOOD WOULD RUN OUT BEFORE YOU GOT MONEY TO BUY MORE.: NEVER TRUE

## 2024-02-06 SDOH — ECONOMIC STABILITY: FOOD INSECURITY: WITHIN THE PAST 12 MONTHS, THE FOOD YOU BOUGHT JUST DIDN'T LAST AND YOU DIDN'T HAVE MONEY TO GET MORE.: NEVER TRUE

## 2024-02-06 NOTE — PATIENT INSTRUCTIONS
Version: 13.9  © 6194-0244 [employer].   Care instructions adapted under license by [employer]. If you have questions about a medical condition or this instruction, always ask your healthcare professional. [employer] disclaims any warranty or liability for your use of this information.           A Healthy Heart: Care Instructions  Your Care Instructions     Coronary artery disease, also called heart disease, occurs when a substance called plaque builds up in the vessels that supply oxygen-rich blood to your heart muscle. This can narrow the blood vessels and reduce blood flow. A heart attack happens when blood flow is completely blocked. A high-fat diet, smoking, and other factors increase the risk of heart disease.  Your doctor has found that you have a chance of having heart disease. You can do lots of things to keep your heart healthy. It may not be easy, but you can change your diet, exercise more, and quit smoking. These steps really work to lower your chance of heart disease.  Follow-up care is a key part of your treatment and safety. Be sure to make and go to all appointments, and call your doctor if you are having problems. It's also a good idea to know your test results and keep a list of the medicines you take.  How can you care for yourself at home?  Diet    Use less salt when you cook and eat. This helps lower your blood pressure. Taste food before salting. Add only a little salt when you think you need it. With time, your taste buds will adjust to less salt.     Eat fewer snack items, fast foods, canned soups, and other high-salt, high-fat, processed foods.     Read food labels and try to avoid saturated and trans fats. They increase your risk of heart disease by raising cholesterol levels.     Limit the amount of solid fat-butter, margarine, and shortening-you eat. Use olive, peanut, or canola oil when you cook. Bake, broil, and steam foods instead of frying them.     Eat

## 2024-02-06 NOTE — PROGRESS NOTES
Medicare Annual Wellness Visit    Belén Patrick is here for Medicare AWV (Pt due for a pap)    Assessment & Plan   Medicare annual wellness visit, subsequent  Encounter for screening mammogram for malignant neoplasm of breast  -     EVAN DIGITAL SCREEN W OR WO CAD BILATERAL; Future  Mixed hyperlipidemia  -     Comprehensive Metabolic Panel  -     CBC  -     Lipid Panel  -     TSH    Recommendations for Preventive Services Due: see orders and patient instructions/AVS.  Recommended screening schedule for the next 5-10 years is provided to the patient in written form: see Patient Instructions/AVS.     Return for Medicare Annual Wellness Visit in 1 year.     Subjective       Patient's complete Health Risk Assessment and screening values have been reviewed and are found in Flowsheets. The following problems were reviewed today and where indicated follow up appointments were made and/or referrals ordered.    Positive Risk Factor Screenings with Interventions:          Drug Use:   Substance and Sexual Activity   Drug Use Yes    Types: Marijuana (Weed)    Comment: daily      DAST-10 Score: 1   Interpretation:  1-2: Low level - Monitor, re-assess at a later date  3-5: Moderate level - Further Investigation  6-8: Substantial level - Intensive Assessment  9-10: Severe level - Intensive Assessment  Interventions:  Patient declined any further intervention or treatment            Vision Screen:  Do you have difficulty driving, watching TV, or doing any of your daily activities because of your eyesight?: (!) Yes  Have you had an eye exam within the past year?: (!) No  No results found.    Interventions:   Patient encouraged to make appointment with their eye specialist      Advanced Directives:  Do you have a Living Will?: (!) No    Intervention:  has NO advanced directive - information provided                     Objective   Vitals:    02/06/24 0804   BP: 118/72   Pulse: 65   Resp: 16   Temp: 97.7 °F (36.5 °C)   TempSrc: Temporal

## 2024-10-29 NOTE — ANESTHESIA PRE PROCEDURE
CO2 25 12/29/2022 08:20 AM    BUN 14 12/29/2022 08:20 AM    CREATININE 0.8 12/29/2022 08:20 AM    GFRAA >60 10/19/2021 08:15 AM    LABGLOM >60 12/29/2022 08:20 AM    GLUCOSE 72 12/29/2022 08:20 AM    GLUCOSE 96 12/12/2011 09:00 AM    PROT 7.4 12/29/2022 08:20 AM    CALCIUM 9.9 12/29/2022 08:20 AM    BILITOT 0.5 12/29/2022 08:20 AM    ALKPHOS 62 12/29/2022 08:20 AM    AST 26 12/29/2022 08:20 AM    ALT 13 12/29/2022 08:20 AM       POC Tests: No results for input(s): POCGLU, POCNA, POCK, POCCL, POCBUN, POCHEMO, POCHCT in the last 72 hours. Coags: No results found for: PROTIME, INR, APTT    HCG (If Applicable): No results found for: PREGTESTUR, PREGSERUM, HCG, HCGQUANT     ABGs: No results found for: PHART, PO2ART, OXV7ZDA, JYG2KBE, BEART, G0AHGAFM     Type & Screen (If Applicable):  No results found for: LABABO, LABRH    Drug/Infectious Status (If Applicable):  No results found for: HIV, HEPCAB    COVID-19 Screening (If Applicable): No results found for: COVID19        Anesthesia Evaluation  Patient summary reviewed no history of anesthetic complications:   Airway: Mallampati: II  TM distance: >3 FB   Neck ROM: full  Mouth opening: > = 3 FB   Dental: normal exam         Pulmonary:Negative Pulmonary ROS breath sounds clear to auscultation                             Cardiovascular:Negative CV ROS            Rhythm: regular             Beta Blocker:  Not on Beta Blocker         Neuro/Psych:   (+) neuromuscular disease:,              ROS comment: Congenital hearing loss GI/Hepatic/Renal:   (+) bowel prep,          ROS comment: Special screening for malignant neoplasms . Endo/Other: Negative Endo/Other ROS                    Abdominal:             Vascular: negative vascular ROS. Other Findings:           Anesthesia Plan      MAC     ASA 1       Induction: intravenous. MIPS: Prophylactic antiemetics administered. Anesthetic plan and risks discussed with patient and spouse.       Plan discussed with
98.5

## 2025-01-16 DIAGNOSIS — E78.2 MIXED HYPERLIPIDEMIA: Primary | ICD-10-CM

## 2025-02-04 DIAGNOSIS — E78.2 MIXED HYPERLIPIDEMIA: ICD-10-CM

## 2025-02-04 LAB
ALBUMIN: 4.5 G/DL (ref 3.5–5.2)
ALP BLD-CCNC: 61 U/L (ref 35–104)
ALT SERPL-CCNC: 15 U/L (ref 0–32)
ANION GAP SERPL CALCULATED.3IONS-SCNC: 10 MMOL/L (ref 7–16)
AST SERPL-CCNC: 23 U/L (ref 0–31)
BASOPHILS ABSOLUTE: 0.05 K/UL (ref 0–0.2)
BASOPHILS RELATIVE PERCENT: 1 % (ref 0–2)
BILIRUB SERPL-MCNC: 0.4 MG/DL (ref 0–1.2)
BUN BLDV-MCNC: 19 MG/DL (ref 6–20)
CALCIUM SERPL-MCNC: 9.8 MG/DL (ref 8.6–10.2)
CHLORIDE BLD-SCNC: 101 MMOL/L (ref 98–107)
CHOLESTEROL, TOTAL: 225 MG/DL
CO2: 28 MMOL/L (ref 22–29)
CREAT SERPL-MCNC: 0.9 MG/DL (ref 0.5–1)
EOSINOPHILS ABSOLUTE: 0.13 K/UL (ref 0.05–0.5)
EOSINOPHILS RELATIVE PERCENT: 2 % (ref 0–6)
GFR, ESTIMATED: 81 ML/MIN/1.73M2
GLUCOSE BLD-MCNC: 84 MG/DL (ref 74–99)
HCT VFR BLD CALC: 45.8 % (ref 34–48)
HDLC SERPL-MCNC: 68 MG/DL
HEMOGLOBIN: 15 G/DL (ref 11.5–15.5)
IMMATURE GRANULOCYTES %: 0 % (ref 0–5)
IMMATURE GRANULOCYTES ABSOLUTE: <0.03 K/UL (ref 0–0.58)
LDL CHOLESTEROL: 142 MG/DL
LYMPHOCYTES ABSOLUTE: 1.71 K/UL (ref 1.5–4)
LYMPHOCYTES RELATIVE PERCENT: 27 % (ref 20–42)
MCH RBC QN AUTO: 30.7 PG (ref 26–35)
MCHC RBC AUTO-ENTMCNC: 32.8 G/DL (ref 32–34.5)
MCV RBC AUTO: 93.9 FL (ref 80–99.9)
MONOCYTES ABSOLUTE: 0.41 K/UL (ref 0.1–0.95)
MONOCYTES RELATIVE PERCENT: 6 % (ref 2–12)
NEUTROPHILS ABSOLUTE: 4.1 K/UL (ref 1.8–7.3)
NEUTROPHILS RELATIVE PERCENT: 64 % (ref 43–80)
PDW BLD-RTO: 13 % (ref 11.5–15)
PLATELET # BLD: 316 K/UL (ref 130–450)
PMV BLD AUTO: 9.4 FL (ref 7–12)
POTASSIUM SERPL-SCNC: 4.6 MMOL/L (ref 3.5–5)
RBC # BLD: 4.88 M/UL (ref 3.5–5.5)
SODIUM BLD-SCNC: 139 MMOL/L (ref 132–146)
TOTAL PROTEIN: 7.7 G/DL (ref 6.4–8.3)
TRIGL SERPL-MCNC: 73 MG/DL
TSH SERPL DL<=0.05 MIU/L-ACNC: 1.97 UIU/ML (ref 0.27–4.2)
VLDLC SERPL CALC-MCNC: 15 MG/DL
WBC # BLD: 6.4 K/UL (ref 4.5–11.5)

## 2025-02-11 ENCOUNTER — OFFICE VISIT (OUTPATIENT)
Dept: FAMILY MEDICINE CLINIC | Age: 55
End: 2025-02-11

## 2025-02-11 VITALS
DIASTOLIC BLOOD PRESSURE: 70 MMHG | HEIGHT: 67 IN | RESPIRATION RATE: 18 BRPM | OXYGEN SATURATION: 97 % | BODY MASS INDEX: 24.48 KG/M2 | WEIGHT: 156 LBS | HEART RATE: 77 BPM | SYSTOLIC BLOOD PRESSURE: 136 MMHG

## 2025-02-11 DIAGNOSIS — Z00.00 MEDICARE ANNUAL WELLNESS VISIT, SUBSEQUENT: Primary | ICD-10-CM

## 2025-02-11 DIAGNOSIS — Z12.31 ENCOUNTER FOR SCREENING MAMMOGRAM FOR BREAST CANCER: ICD-10-CM

## 2025-02-11 RX ORDER — CLOTRIMAZOLE AND BETAMETHASONE DIPROPIONATE 10; .64 MG/G; MG/G
CREAM TOPICAL
Qty: 45 G | Refills: 1 | Status: SHIPPED | OUTPATIENT
Start: 2025-02-11

## 2025-02-11 SDOH — ECONOMIC STABILITY: FOOD INSECURITY: WITHIN THE PAST 12 MONTHS, YOU WORRIED THAT YOUR FOOD WOULD RUN OUT BEFORE YOU GOT MONEY TO BUY MORE.: NEVER TRUE

## 2025-02-11 SDOH — ECONOMIC STABILITY: FOOD INSECURITY: WITHIN THE PAST 12 MONTHS, THE FOOD YOU BOUGHT JUST DIDN'T LAST AND YOU DIDN'T HAVE MONEY TO GET MORE.: NEVER TRUE

## 2025-02-11 ASSESSMENT — PATIENT HEALTH QUESTIONNAIRE - PHQ9
SUM OF ALL RESPONSES TO PHQ9 QUESTIONS 1 & 2: 0
SUM OF ALL RESPONSES TO PHQ QUESTIONS 1-9: 0
1. LITTLE INTEREST OR PLEASURE IN DOING THINGS: NOT AT ALL
2. FEELING DOWN, DEPRESSED OR HOPELESS: NOT AT ALL
SUM OF ALL RESPONSES TO PHQ QUESTIONS 1-9: 0

## 2025-02-11 ASSESSMENT — LIFESTYLE VARIABLES
HOW OFTEN DO YOU HAVE A DRINK CONTAINING ALCOHOL: NEVER
HOW MANY STANDARD DRINKS CONTAINING ALCOHOL DO YOU HAVE ON A TYPICAL DAY: PATIENT DOES NOT DRINK

## 2025-02-11 NOTE — PATIENT INSTRUCTIONS
a doctor or a  who doesn't know you.  It may help to think of an advance directive as a gift to the people who care for you. If you have one, they won't have to make tough decisions by themselves.  For more information, including forms for your state, see the [employer] website (www.caringinfo.org/planning/advance-directives/).  Follow-up care is a key part of your treatment and safety. Be sure to make and go to all appointments, and call your doctor if you are having problems. It's also a good idea to know your test results and keep a list of the medicines you take.  What should you include in an advance directive?  Many states have a unique advance directive form. (It may ask you to address specific issues.) Or you might use a universal form that's approved by many states.  If your form doesn't tell you what to address, it may be hard to know what to include in your advance directive. Use the questions below to help you get started.  Who do you want to make decisions about your medical care if you are not able to?  What life-support measures do you want if you have a serious illness that gets worse over time or can't be cured?  What are you most afraid of that might happen? (Maybe you're afraid of having pain, losing your independence, or being kept alive by machines.)  Where would you prefer to die? (Your home? A hospital? A nursing home?)  Do you want to donate your organs when you die?  Do you want certain [employer] practices performed before you die?  When should you call for help?  Be sure to contact your doctor if you have any questions.  Where can you learn more?  Go to https://www.healthTourRadar.net/patientEd and enter R264 to learn more about \"Advance Directives: Care Instructions.\"  Current as of: November 16, 2023  Content Version: 14.3  © 2024 [employer].   Care instructions adapted under license by [employer]. If you have questions about a medical condition or this instruction, always

## 2025-02-11 NOTE — PROGRESS NOTES
applicable) and other individuals in attendance at the appointment consented to the use of AI, including the recording.

## (undated) DEVICE — GRADUATE TRIANG MEASURE 1000ML BLK PRNT

## (undated) DEVICE — FORCEPS BX L240CM JAW DIA2.4MM ORNG L CAP W/ NDL DISP RAD

## (undated) DEVICE — SPONGE GZ W4XL4IN RAYON POLY CVR W/NONWOVEN FAB STRL 2/PK